# Patient Record
Sex: FEMALE | Race: WHITE | NOT HISPANIC OR LATINO | Employment: FULL TIME | URBAN - METROPOLITAN AREA
[De-identification: names, ages, dates, MRNs, and addresses within clinical notes are randomized per-mention and may not be internally consistent; named-entity substitution may affect disease eponyms.]

---

## 2019-07-16 ENCOUNTER — LAB VISIT (OUTPATIENT)
Dept: LAB | Facility: HOSPITAL | Age: 29
End: 2019-07-16
Attending: HOSPITALIST
Payer: COMMERCIAL

## 2019-07-16 ENCOUNTER — TELEPHONE (OUTPATIENT)
Dept: INTERNAL MEDICINE | Facility: CLINIC | Age: 29
End: 2019-07-16

## 2019-07-16 ENCOUNTER — PATIENT OUTREACH (OUTPATIENT)
Dept: ADMINISTRATIVE | Facility: HOSPITAL | Age: 29
End: 2019-07-16

## 2019-07-16 ENCOUNTER — OFFICE VISIT (OUTPATIENT)
Dept: INTERNAL MEDICINE | Facility: CLINIC | Age: 29
End: 2019-07-16
Payer: COMMERCIAL

## 2019-07-16 VITALS
HEART RATE: 84 BPM | BODY MASS INDEX: 23.71 KG/M2 | TEMPERATURE: 99 F | WEIGHT: 138.88 LBS | DIASTOLIC BLOOD PRESSURE: 70 MMHG | RESPIRATION RATE: 16 BRPM | HEIGHT: 64 IN | SYSTOLIC BLOOD PRESSURE: 104 MMHG

## 2019-07-16 DIAGNOSIS — Z11.3 SCREEN FOR STD (SEXUALLY TRANSMITTED DISEASE): ICD-10-CM

## 2019-07-16 DIAGNOSIS — Z00.00 ANNUAL PHYSICAL EXAM: ICD-10-CM

## 2019-07-16 DIAGNOSIS — Z00.00 ANNUAL PHYSICAL EXAM: Primary | ICD-10-CM

## 2019-07-16 LAB
25(OH)D3+25(OH)D2 SERPL-MCNC: 19 NG/ML (ref 30–96)
ALBUMIN SERPL BCP-MCNC: 4.5 G/DL (ref 3.5–5.2)
ALP SERPL-CCNC: 79 U/L (ref 55–135)
ALT SERPL W/O P-5'-P-CCNC: 12 U/L (ref 10–44)
ANION GAP SERPL CALC-SCNC: 8 MMOL/L (ref 8–16)
AST SERPL-CCNC: 16 U/L (ref 10–40)
BASOPHILS # BLD AUTO: 0.04 K/UL (ref 0–0.2)
BASOPHILS NFR BLD: 0.8 % (ref 0–1.9)
BILIRUB SERPL-MCNC: 0.6 MG/DL (ref 0.1–1)
BUN SERPL-MCNC: 8 MG/DL (ref 6–20)
CALCIUM SERPL-MCNC: 10 MG/DL (ref 8.7–10.5)
CHLORIDE SERPL-SCNC: 105 MMOL/L (ref 95–110)
CHOLEST SERPL-MCNC: 150 MG/DL (ref 120–199)
CHOLEST/HDLC SERPL: 1.9 {RATIO} (ref 2–5)
CO2 SERPL-SCNC: 26 MMOL/L (ref 23–29)
CREAT SERPL-MCNC: 0.7 MG/DL (ref 0.5–1.4)
DIFFERENTIAL METHOD: ABNORMAL
EOSINOPHIL # BLD AUTO: 0.1 K/UL (ref 0–0.5)
EOSINOPHIL NFR BLD: 2 % (ref 0–8)
ERYTHROCYTE [DISTWIDTH] IN BLOOD BY AUTOMATED COUNT: 12.3 % (ref 11.5–14.5)
EST. GFR  (AFRICAN AMERICAN): >60 ML/MIN/1.73 M^2
EST. GFR  (NON AFRICAN AMERICAN): >60 ML/MIN/1.73 M^2
ESTIMATED AVG GLUCOSE: 88 MG/DL (ref 68–131)
GLUCOSE SERPL-MCNC: 77 MG/DL (ref 70–110)
HAV IGM SERPL QL IA: NEGATIVE
HBA1C MFR BLD HPLC: 4.7 % (ref 4–5.6)
HBV CORE IGM SERPL QL IA: NEGATIVE
HBV SURFACE AG SERPL QL IA: NEGATIVE
HCT VFR BLD AUTO: 43.1 % (ref 37–48.5)
HCV AB SERPL QL IA: NEGATIVE
HDLC SERPL-MCNC: 77 MG/DL (ref 40–75)
HDLC SERPL: 51.3 % (ref 20–50)
HGB BLD-MCNC: 13.7 G/DL (ref 12–16)
HIV 1+2 AB+HIV1 P24 AG SERPL QL IA: NEGATIVE
IMM GRANULOCYTES # BLD AUTO: 0.02 K/UL (ref 0–0.04)
IMM GRANULOCYTES NFR BLD AUTO: 0.4 % (ref 0–0.5)
LDLC SERPL CALC-MCNC: 59.4 MG/DL (ref 63–159)
LYMPHOCYTES # BLD AUTO: 1 K/UL (ref 1–4.8)
LYMPHOCYTES NFR BLD: 20.5 % (ref 18–48)
MCH RBC QN AUTO: 31.4 PG (ref 27–31)
MCHC RBC AUTO-ENTMCNC: 31.8 G/DL (ref 32–36)
MCV RBC AUTO: 99 FL (ref 82–98)
MONOCYTES # BLD AUTO: 0.6 K/UL (ref 0.3–1)
MONOCYTES NFR BLD: 11 % (ref 4–15)
NEUTROPHILS # BLD AUTO: 3.3 K/UL (ref 1.8–7.7)
NEUTROPHILS NFR BLD: 65.3 % (ref 38–73)
NONHDLC SERPL-MCNC: 73 MG/DL
NRBC BLD-RTO: 0 /100 WBC
PLATELET # BLD AUTO: 303 K/UL (ref 150–350)
PMV BLD AUTO: 11 FL (ref 9.2–12.9)
POTASSIUM SERPL-SCNC: 3.8 MMOL/L (ref 3.5–5.1)
PROT SERPL-MCNC: 7.6 G/DL (ref 6–8.4)
RBC # BLD AUTO: 4.37 M/UL (ref 4–5.4)
SODIUM SERPL-SCNC: 139 MMOL/L (ref 136–145)
TRIGL SERPL-MCNC: 68 MG/DL (ref 30–150)
TSH SERPL DL<=0.005 MIU/L-ACNC: 1.27 UIU/ML (ref 0.4–4)
WBC # BLD AUTO: 5.08 K/UL (ref 3.9–12.7)

## 2019-07-16 PROCEDURE — 83036 HEMOGLOBIN GLYCOSYLATED A1C: CPT

## 2019-07-16 PROCEDURE — 36415 COLL VENOUS BLD VENIPUNCTURE: CPT | Mod: PO

## 2019-07-16 PROCEDURE — 99999 PR PBB SHADOW E&M-NEW PATIENT-LVL III: CPT | Mod: PBBFAC,,, | Performed by: HOSPITALIST

## 2019-07-16 PROCEDURE — 99385 PR PREVENTIVE VISIT,NEW,18-39: ICD-10-PCS | Mod: S$GLB,,, | Performed by: HOSPITALIST

## 2019-07-16 PROCEDURE — 86592 SYPHILIS TEST NON-TREP QUAL: CPT

## 2019-07-16 PROCEDURE — 80061 LIPID PANEL: CPT

## 2019-07-16 PROCEDURE — 82306 VITAMIN D 25 HYDROXY: CPT

## 2019-07-16 PROCEDURE — 99385 PREV VISIT NEW AGE 18-39: CPT | Mod: S$GLB,,, | Performed by: HOSPITALIST

## 2019-07-16 PROCEDURE — 86703 HIV-1/HIV-2 1 RESULT ANTBDY: CPT

## 2019-07-16 PROCEDURE — 80053 COMPREHEN METABOLIC PANEL: CPT

## 2019-07-16 PROCEDURE — 99999 PR PBB SHADOW E&M-NEW PATIENT-LVL III: ICD-10-PCS | Mod: PBBFAC,,, | Performed by: HOSPITALIST

## 2019-07-16 PROCEDURE — 84443 ASSAY THYROID STIM HORMONE: CPT

## 2019-07-16 PROCEDURE — 80074 ACUTE HEPATITIS PANEL: CPT

## 2019-07-16 PROCEDURE — 85025 COMPLETE CBC W/AUTO DIFF WBC: CPT

## 2019-07-16 RX ORDER — MINERAL OIL
180 ENEMA (ML) RECTAL DAILY
COMMUNITY

## 2019-07-16 NOTE — TELEPHONE ENCOUNTER
Dr. Cunningham is requesting pap records from:  Dr. Jeannie Marquez with [facility] from 2019.

## 2019-07-16 NOTE — PROGRESS NOTES
Subjective:     @Patient ID: Ada Ramirez is a 28 y.o. female.    Chief Complaint: Annual Exam and Establish Care    HPI    28 y.o. female here for annual exam. Pt is new to me. She works as a  at [employer]. She will be starting graduate school in the fall at [employer]. Plans to study English Literature. She reports she is otherwise well. Possibly anxious about starting grad school but will notify MD if becomes an issue once she starts.    Lipid disorders/ASCVD risk (ages >/= 45 or >/= 20 if increased risk ): ordered  DM (>45y yearly or if obese, HTN): A1c ordered  STD screening: ordered   Eye exam:  Feb 2019   Cervical Cancer (Pap Smear ages 21-65 every 3 years or Pap + HPV q5 years after 30 years of age):  Dr Jeannie Travis [place]. Done May 2019. Will get records        Vaccines:   Influenza (yearly)    Tetanus (every 10 yrs - 1st tdap) done Tdap 7/2019  PPSV23(>66yo or <65 w/ lung dz, smoking, DM)  n/a    Exercise: walking.   Diet: regular       Review of Systems   Constitutional: Negative for chills and fever.   HENT: Negative for congestion and sore throat.    Eyes: Negative for pain and visual disturbance.   Respiratory: Negative for cough and shortness of breath.    Cardiovascular: Negative for chest pain and leg swelling.   Gastrointestinal: Negative for abdominal pain, nausea and vomiting.   Endocrine: Negative for polydipsia and polyuria.   Genitourinary: Negative for difficulty urinating and dysuria.   Musculoskeletal: Negative for arthralgias and back pain.   Skin: Negative for rash and wound.   Neurological: Negative for dizziness, weakness and headaches.   Psychiatric/Behavioral: Negative for agitation and confusion.     Past medical history, surgical history, and family medical history reviewed and updated as appropriate.    Medications and allergies reviewed.     Objective:     Vitals:    07/16/19 0822   BP: 104/70   BP Location: Right arm   Patient Position: Sitting   BP Method:  "Medium (Manual)   Pulse: 84   Resp: 16   Temp: 98.8 °F (37.1 °C)   TempSrc: Oral   Weight: 63 kg (138 lb 14.2 oz)   Height: 5' 4" (1.626 m)     Body mass index is 23.84 kg/m².  Physical Exam   Constitutional: She is oriented to person, place, and time. She appears well-developed and well-nourished. No distress.   HENT:   Head: Normocephalic and atraumatic.   Mouth/Throat: Oropharynx is clear and moist. No oropharyngeal exudate.   Eyes: Conjunctivae are normal. Right eye exhibits no discharge. Left eye exhibits no discharge.   Neck: Normal range of motion. Neck supple.   Cardiovascular: Normal rate, regular rhythm and intact distal pulses. Exam reveals no friction rub.   No murmur heard.  Pulmonary/Chest: Effort normal and breath sounds normal.   Abdominal: Soft. Bowel sounds are normal. She exhibits no distension. There is no tenderness. There is no guarding.   Musculoskeletal: Normal range of motion. She exhibits no edema.   Lymphadenopathy:     She has no cervical adenopathy.   Neurological: She is alert and oriented to person, place, and time.   Skin: Skin is warm and dry.   Psychiatric: She has a normal mood and affect. Her behavior is normal.   Vitals reviewed.      No results found for: WBC, HGB, HCT, PLT, CHOL, TRIG, HDL, LDLDIRECT, ALT, AST, NA, K, CL, CREATININE, BUN, CO2, TSH, PSA, INR, GLUF, HGBA1C, MICROALBUR    Assessment:     1. Annual physical exam    2. Screen for STD (sexually transmitted disease)      Plan:   Ada was seen today for annual exam and establish care.    Diagnoses and all orders for this visit:    Annual physical exam  -     Comprehensive metabolic panel; Future  -     CBC auto differential; Future  -     TSH; Future  -     Vitamin D; Future  -     Lipid panel; Future  -     HIV 1/2 Ag/Ab (4th Gen); Future  -     Urinalysis; Future  -     HEMOGLOBIN A1C; Future  -     RPR; Future  -     C. trachomatis/N. gonorrhoeae by AMP DNA [facility]; Urine; Future  -     Hepatitis panel, acute; " Future    Screen for STD (sexually transmitted disease)  -     HIV 1/2 Ag/Ab (4th Gen); Future  -     RPR; Future  -     C. trachomatis/N. gonorrhoeae by AMP DNA [facility]; Urine; Future  -     Hepatitis panel, acute; Future          Follow up in about 1 year (around 7/16/2020), or if symptoms worsen or fail to improve.    Molly Cunningham MD  Internal Medicine    7/16/2019

## 2019-07-16 NOTE — LETTER
July 16, 2019    Dr. Marquez               [facility]  [address] [place] [address]  [phone]   July 16, 2019     Patient: Ada Ramirez    YOB: 1990   Date of Visit: 7/16/2019       To Whom It May Concern:    The patient listed above has been seen recently by a primary care provider, Dr. Cunningham, at [facility]. Please release the following information specified below for the patient.    Pap Smear    Please fax the requested record to our secure fax number [phone]  Attention: MARCOS Santos    Thank you for your help! If I can be of any assistance please contact me at the number listed below.      Regards,    Danielle Khalil LPN  Clinical Care Coordinator  For Molly Cunningham MD  [facility]  [phone] phone  [phone] fax  [email or web address]

## 2019-07-17 LAB — RPR SER QL: NORMAL

## 2019-07-18 ENCOUNTER — PATIENT MESSAGE (OUTPATIENT)
Dept: INTERNAL MEDICINE | Facility: CLINIC | Age: 29
End: 2019-07-18

## 2019-07-19 ENCOUNTER — PATIENT MESSAGE (OUTPATIENT)
Dept: INTERNAL MEDICINE | Facility: CLINIC | Age: 29
End: 2019-07-19

## 2019-08-12 ENCOUNTER — PATIENT OUTREACH (OUTPATIENT)
Dept: ADMINISTRATIVE | Facility: HOSPITAL | Age: 29
End: 2019-08-12

## 2019-11-27 ENCOUNTER — OFFICE VISIT (OUTPATIENT)
Dept: INTERNAL MEDICINE | Facility: CLINIC | Age: 29
End: 2019-11-27
Payer: COMMERCIAL

## 2019-11-27 VITALS
WEIGHT: 139.13 LBS | RESPIRATION RATE: 16 BRPM | HEART RATE: 90 BPM | TEMPERATURE: 99 F | DIASTOLIC BLOOD PRESSURE: 54 MMHG | HEIGHT: 64 IN | BODY MASS INDEX: 23.75 KG/M2 | SYSTOLIC BLOOD PRESSURE: 96 MMHG

## 2019-11-27 DIAGNOSIS — H60.91 OTITIS EXTERNA OF RIGHT EAR, UNSPECIFIED CHRONICITY, UNSPECIFIED TYPE: ICD-10-CM

## 2019-11-27 DIAGNOSIS — F43.23 SITUATIONAL MIXED ANXIETY AND DEPRESSIVE DISORDER: Primary | ICD-10-CM

## 2019-11-27 PROBLEM — F41.1 GAD (GENERALIZED ANXIETY DISORDER): Status: ACTIVE | Noted: 2019-11-27

## 2019-11-27 PROCEDURE — 99213 OFFICE O/P EST LOW 20 MIN: CPT | Mod: 25,S$GLB,, | Performed by: HOSPITALIST

## 2019-11-27 PROCEDURE — 3008F BODY MASS INDEX DOCD: CPT | Mod: CPTII,S$GLB,, | Performed by: HOSPITALIST

## 2019-11-27 PROCEDURE — 90686 IIV4 VACC NO PRSV 0.5 ML IM: CPT | Mod: S$GLB,,, | Performed by: HOSPITALIST

## 2019-11-27 PROCEDURE — 90471 FLU VACCINE (QUAD) GREATER THAN OR EQUAL TO 3YO PRESERVATIVE FREE IM: ICD-10-PCS | Mod: S$GLB,,, | Performed by: HOSPITALIST

## 2019-11-27 PROCEDURE — 99999 PR PBB SHADOW E&M-EST. PATIENT-LVL IV: ICD-10-PCS | Mod: PBBFAC,,, | Performed by: HOSPITALIST

## 2019-11-27 PROCEDURE — 3008F PR BODY MASS INDEX (BMI) DOCUMENTED: ICD-10-PCS | Mod: CPTII,S$GLB,, | Performed by: HOSPITALIST

## 2019-11-27 PROCEDURE — 99213 PR OFFICE/OUTPT VISIT, EST, LEVL III, 20-29 MIN: ICD-10-PCS | Mod: 25,S$GLB,, | Performed by: HOSPITALIST

## 2019-11-27 PROCEDURE — 90471 IMMUNIZATION ADMIN: CPT | Mod: S$GLB,,, | Performed by: HOSPITALIST

## 2019-11-27 PROCEDURE — 99999 PR PBB SHADOW E&M-EST. PATIENT-LVL IV: CPT | Mod: PBBFAC,,, | Performed by: HOSPITALIST

## 2019-11-27 PROCEDURE — 90686 FLU VACCINE (QUAD) GREATER THAN OR EQUAL TO 3YO PRESERVATIVE FREE IM: ICD-10-PCS | Mod: S$GLB,,, | Performed by: HOSPITALIST

## 2019-11-27 RX ORDER — NEOMYCIN SULFATE, POLYMYXIN B SULFATE AND HYDROCORTISONE 10; 3.5; 1 MG/ML; MG/ML; [USP'U]/ML
4 SUSPENSION/ DROPS AURICULAR (OTIC) 3 TIMES DAILY
Qty: 6 ML | Refills: 0 | Status: SHIPPED | OUTPATIENT
Start: 2019-11-27 | End: 2020-03-11

## 2019-11-27 RX ORDER — FLUOXETINE 10 MG/1
10 CAPSULE ORAL DAILY
Qty: 30 CAPSULE | Refills: 2 | Status: SHIPPED | OUTPATIENT
Start: 2019-11-27 | End: 2020-06-18

## 2019-11-27 RX ORDER — VALACYCLOVIR HYDROCHLORIDE 500 MG/1
TABLET, FILM COATED ORAL
Refills: 3 | COMMUNITY
Start: 2019-11-03 | End: 2022-04-25 | Stop reason: SDUPTHER

## 2019-11-27 NOTE — PATIENT INSTRUCTIONS

## 2019-11-27 NOTE — PROGRESS NOTES
"Subjective:     @Patient ID: Ada Ramirez is a 29 y.o. female.    Chief Complaint: Anxiety    HPI     30 yo F presents for evaluation of anxiety. recently started graduate school at [employer] and working full time as a . She is also in a new relationship and reports her roommate and best friend is moving to a new city. States it has been a lot of changes recently and her job has been stressful.   Reports feeling very anxious worsening this week. And feeling down as a result. States no active SI but has had thoughts of just wanting to sleep and   Has had anxiety in college. Was on prozac then. Later lexapro. Has not been on anything for 4 years. Reports not sleeping well, busier now with school and work and thinks that is contributing to.   Also reports R ear redness, pain     Review of Systems   Constitutional: Positive for activity change. Negative for unexpected weight change.   HENT: Negative for hearing loss, rhinorrhea and trouble swallowing.    Eyes: Negative for discharge and visual disturbance.   Respiratory: Negative for chest tightness and wheezing.    Cardiovascular: Negative for chest pain and palpitations.   Gastrointestinal: Negative for blood in stool, constipation, diarrhea and vomiting.   Endocrine: Negative for polydipsia and polyuria.   Genitourinary: Negative for difficulty urinating, dysuria, hematuria and menstrual problem.   Musculoskeletal: Negative for arthralgias, joint swelling and neck pain.   Neurological: Negative for weakness and headaches.   Psychiatric/Behavioral: Positive for dysphoric mood. Negative for confusion. The patient is nervous/anxious.      Past medical history, surgical history, and family medical history reviewed and updated as appropriate.    Medications and allergies reviewed.     Objective:     Vitals:    11/27/19 1510   BP: (!) 96/54   Pulse: 90   Resp: 16   Temp: 99 °F (37.2 °C)   TempSrc: Oral   Weight: 63.1 kg (139 lb 1.8 oz)   Height: 5' 4" (1.626 m)     Body " mass index is 23.88 kg/m².  Physical Exam   Constitutional: She is oriented to person, place, and time. She appears well-developed and well-nourished. No distress.   HENT:   Head: Normocephalic and atraumatic.   Left Ear: External ear normal.   Mouth/Throat: Oropharynx is clear and moist. No oropharyngeal exudate.   Normal TM b/l  R external ear appears red, inflammed   Eyes: Conjunctivae are normal. Right eye exhibits no discharge. Left eye exhibits no discharge.   Neck: Normal range of motion. Neck supple.   Cardiovascular: Normal rate, regular rhythm and intact distal pulses. Exam reveals no friction rub.   No murmur heard.  Pulmonary/Chest: Effort normal and breath sounds normal.   Musculoskeletal: Normal range of motion. She exhibits no edema.   Neurological: She is alert and oriented to person, place, and time.   Skin: Skin is warm and dry.   Psychiatric: She has a normal mood and affect. Her behavior is normal.   Vitals reviewed.      Lab Results   Component Value Date    WBC 5.08 07/16/2019    HGB 13.7 07/16/2019    HCT 43.1 07/16/2019     07/16/2019    CHOL 150 07/16/2019    TRIG 68 07/16/2019    HDL 77 (H) 07/16/2019    ALT 12 07/16/2019    AST 16 07/16/2019     07/16/2019    K 3.8 07/16/2019     07/16/2019    CREATININE 0.7 07/16/2019    BUN 8 07/16/2019    CO2 26 07/16/2019    TSH 1.269 07/16/2019    HGBA1C 4.7 07/16/2019       Assessment:     1. Situational mixed anxiety and depressive disorder    2. Otitis externa of right ear, unspecified chronicity, unspecified type      Plan:   Ada was seen today for anxiety.    Diagnoses and all orders for this visit:    Situational mixed anxiety and depressive disorder  - pt has hx of RYAN; Will start fluoxetine. Can increased to 20 mg in 1 week if tolerated. Pt to notify MD if would like to increase. Also counseled on possible side effects to monitor for (n/v, negative mood changes etc). Also encourage exercise. Will f/u in 2 months.     Otitis  externa of right ear, unspecified chronicity, unspecified type  - will give course of ear drops     Other orders  -     FLUoxetine 10 MG capsule; Take 1 capsule (10 mg total) by mouth once daily.  -     neomycin-polymyxin-hydrocortisone (CORTISPORIN) 3.5-10,000-1 mg/mL-unit/mL-% otic suspension; Place 4 drops into the right ear 3 (three) times daily. Do not use more than 10 days  -     Influenza - Quadrivalent (PF)        RTC 2 months   Molly Cunningham MD  Internal Medicine    11/27/2019

## 2019-12-02 ENCOUNTER — PATIENT MESSAGE (OUTPATIENT)
Dept: INTERNAL MEDICINE | Facility: CLINIC | Age: 29
End: 2019-12-02

## 2020-01-29 ENCOUNTER — OFFICE VISIT (OUTPATIENT)
Dept: INTERNAL MEDICINE | Facility: CLINIC | Age: 30
End: 2020-01-29
Payer: COMMERCIAL

## 2020-01-29 VITALS
TEMPERATURE: 99 F | HEIGHT: 64 IN | HEART RATE: 86 BPM | WEIGHT: 136.25 LBS | BODY MASS INDEX: 23.26 KG/M2 | SYSTOLIC BLOOD PRESSURE: 116 MMHG | DIASTOLIC BLOOD PRESSURE: 62 MMHG | RESPIRATION RATE: 16 BRPM

## 2020-01-29 DIAGNOSIS — F41.1 GAD (GENERALIZED ANXIETY DISORDER): Primary | ICD-10-CM

## 2020-01-29 PROCEDURE — 99213 PR OFFICE/OUTPT VISIT, EST, LEVL III, 20-29 MIN: ICD-10-PCS | Mod: S$GLB,,, | Performed by: HOSPITALIST

## 2020-01-29 PROCEDURE — 99999 PR PBB SHADOW E&M-EST. PATIENT-LVL III: CPT | Mod: PBBFAC,,, | Performed by: HOSPITALIST

## 2020-01-29 PROCEDURE — 99999 PR PBB SHADOW E&M-EST. PATIENT-LVL III: ICD-10-PCS | Mod: PBBFAC,,, | Performed by: HOSPITALIST

## 2020-01-29 PROCEDURE — 99213 OFFICE O/P EST LOW 20 MIN: CPT | Mod: S$GLB,,, | Performed by: HOSPITALIST

## 2020-01-29 PROCEDURE — 3008F BODY MASS INDEX DOCD: CPT | Mod: CPTII,S$GLB,, | Performed by: HOSPITALIST

## 2020-01-29 PROCEDURE — 3008F PR BODY MASS INDEX (BMI) DOCUMENTED: ICD-10-PCS | Mod: CPTII,S$GLB,, | Performed by: HOSPITALIST

## 2020-01-29 RX ORDER — BUSPIRONE HYDROCHLORIDE 5 MG/1
5 TABLET ORAL 2 TIMES DAILY
Qty: 60 TABLET | Refills: 5 | Status: SHIPPED | OUTPATIENT
Start: 2020-01-29 | End: 2020-08-03 | Stop reason: SDUPTHER

## 2020-02-03 NOTE — PROGRESS NOTES
"Subjective:     @Patient ID: Ada Ramirez is a 29 y.o. female.    Chief Complaint: Follow-up    HPI    28 yo F presents for f/u of anxiety. Pt reports initially fluoxetine was working but fluoxetine has not been working well for her as makes her feel strange, not herself. Denies any SI. She reports she decreased the dose of her medication to 10 mg since last office visit and would like to change meds. She does report her sister has been on buspar and that has worked well for her. Reports she is otherwise doing ok    Review of Systems   Constitutional: Negative for chills and fever.   HENT: Negative for congestion and sore throat.    Eyes: Negative for pain and visual disturbance.   Respiratory: Negative for cough and shortness of breath.    Cardiovascular: Negative for chest pain.   Gastrointestinal: Negative for abdominal pain, nausea and vomiting.   Genitourinary: Negative for difficulty urinating and dysuria.   Musculoskeletal: Negative for arthralgias and back pain.   Neurological: Negative for dizziness, weakness and headaches.   Psychiatric/Behavioral: Negative for agitation and self-injury. The patient is nervous/anxious.      Past medical history, surgical history, and family medical history reviewed and updated as appropriate.    Medications and allergies reviewed.     Objective:     Vitals:    01/29/20 1547   BP: 116/62   BP Location: Right arm   Patient Position: Sitting   BP Method: Medium (Manual)   Pulse: 86   Resp: 16   Temp: 99.1 °F (37.3 °C)   TempSrc: Oral   Weight: 61.8 kg (136 lb 3.9 oz)   Height: 5' 4" (1.626 m)     Body mass index is 23.39 kg/m².  Physical Exam   Constitutional: She is oriented to person, place, and time. She appears well-developed and well-nourished. No distress.   HENT:   Head: Normocephalic and atraumatic.   Eyes: Conjunctivae are normal. Right eye exhibits no discharge. Left eye exhibits no discharge.   Neck: Normal range of motion. Neck supple.   Cardiovascular: Normal " rate, regular rhythm and intact distal pulses. Exam reveals no friction rub.   No murmur heard.  Pulmonary/Chest: Effort normal and breath sounds normal.   Musculoskeletal: Normal range of motion. She exhibits no edema.   Neurological: She is alert and oriented to person, place, and time.   Skin: Skin is warm and dry.   Psychiatric: She has a normal mood and affect. Her behavior is normal.   Vitals reviewed.      Lab Results   Component Value Date    WBC 5.08 07/16/2019    HGB 13.7 07/16/2019    HCT 43.1 07/16/2019     07/16/2019    CHOL 150 07/16/2019    TRIG 68 07/16/2019    HDL 77 (H) 07/16/2019    ALT 12 07/16/2019    AST 16 07/16/2019     07/16/2019    K 3.8 07/16/2019     07/16/2019    CREATININE 0.7 07/16/2019    BUN 8 07/16/2019    CO2 26 07/16/2019    TSH 1.269 07/16/2019    HGBA1C 4.7 07/16/2019       Assessment:     1. RYAN (generalized anxiety disorder)      Plan:   Ada was seen today for follow-up.    Diagnoses and all orders for this visit:    RYAN (generalized anxiety disorder)       - Will wean off fluoxetine weekly (take every other day, then 3x/week, 2x/week, then stop). Pt aware       - Will start buspar. Counseled to monitor for possible side effects ie agitation, nausea etc. Notify MD if no improvement     Other orders  -     busPIRone (BUSPAR) 5 MG Tab; Take 1 tablet (5 mg total) by mouth 2 (two) times daily.          No follow-ups on file.    Molly Cunningham MD  Internal Medicine    2/3/2020

## 2020-02-07 ENCOUNTER — PATIENT MESSAGE (OUTPATIENT)
Dept: INTERNAL MEDICINE | Facility: CLINIC | Age: 30
End: 2020-02-07

## 2020-03-04 ENCOUNTER — PATIENT MESSAGE (OUTPATIENT)
Dept: INTERNAL MEDICINE | Facility: CLINIC | Age: 30
End: 2020-03-04

## 2020-03-04 NOTE — TELEPHONE ENCOUNTER
I spoke to the patient and she will wait to see Dr. Cunningham. The patient instructed to visit urgent care if symptoms worsens.

## 2020-03-11 ENCOUNTER — OFFICE VISIT (OUTPATIENT)
Dept: INTERNAL MEDICINE | Facility: CLINIC | Age: 30
End: 2020-03-11
Payer: COMMERCIAL

## 2020-03-11 ENCOUNTER — HOSPITAL ENCOUNTER (OUTPATIENT)
Dept: RADIOLOGY | Facility: HOSPITAL | Age: 30
Discharge: HOME | End: 2020-03-11
Attending: HOSPITALIST
Payer: COMMERCIAL

## 2020-03-11 VITALS
WEIGHT: 140.44 LBS | HEART RATE: 94 BPM | TEMPERATURE: 98 F | BODY MASS INDEX: 23.98 KG/M2 | RESPIRATION RATE: 19 BRPM | DIASTOLIC BLOOD PRESSURE: 72 MMHG | HEIGHT: 64 IN | SYSTOLIC BLOOD PRESSURE: 92 MMHG

## 2020-03-11 DIAGNOSIS — M53.3 COCCYX PAIN: ICD-10-CM

## 2020-03-11 DIAGNOSIS — K64.9 HEMORRHOIDS, UNSPECIFIED HEMORRHOID TYPE: ICD-10-CM

## 2020-03-11 DIAGNOSIS — G57.11 NEUROPATHY OF RIGHT LATERAL FEMORAL CUTANEOUS NERVE: Primary | ICD-10-CM

## 2020-03-11 PROCEDURE — 99999 PR PBB SHADOW E&M-EST. PATIENT-LVL IV: CPT | Mod: PBBFAC,,, | Performed by: HOSPITALIST

## 2020-03-11 PROCEDURE — 3008F PR BODY MASS INDEX (BMI) DOCUMENTED: ICD-10-PCS | Mod: CPTII,S$GLB,, | Performed by: HOSPITALIST

## 2020-03-11 PROCEDURE — 72220 X-RAY EXAM SACRUM TAILBONE: CPT | Mod: 26,,, | Performed by: RADIOLOGY

## 2020-03-11 PROCEDURE — 72220 XR SACRUM AND COCCYX: ICD-10-PCS | Mod: 26,,, | Performed by: RADIOLOGY

## 2020-03-11 PROCEDURE — 99214 OFFICE O/P EST MOD 30 MIN: CPT | Mod: S$GLB,,, | Performed by: HOSPITALIST

## 2020-03-11 PROCEDURE — 99999 PR PBB SHADOW E&M-EST. PATIENT-LVL IV: ICD-10-PCS | Mod: PBBFAC,,, | Performed by: HOSPITALIST

## 2020-03-11 PROCEDURE — 72220 X-RAY EXAM SACRUM TAILBONE: CPT | Mod: TC,PO

## 2020-03-11 PROCEDURE — 3008F BODY MASS INDEX DOCD: CPT | Mod: CPTII,S$GLB,, | Performed by: HOSPITALIST

## 2020-03-11 PROCEDURE — 99214 PR OFFICE/OUTPT VISIT, EST, LEVL IV, 30-39 MIN: ICD-10-PCS | Mod: S$GLB,,, | Performed by: HOSPITALIST

## 2020-03-11 RX ORDER — MELOXICAM 7.5 MG/1
7.5 TABLET ORAL DAILY
Qty: 30 TABLET | Refills: 1 | Status: SHIPPED | OUTPATIENT
Start: 2020-03-11 | End: 2020-05-13 | Stop reason: SDUPTHER

## 2020-03-11 RX ORDER — HYDROCORTISONE 25 MG/G
CREAM TOPICAL 2 TIMES DAILY
Qty: 28 G | Refills: 0 | Status: SHIPPED | OUTPATIENT
Start: 2020-03-11 | End: 2021-09-30

## 2020-03-11 NOTE — PROGRESS NOTES
"Subjective:     @Patient ID: Ada Ramirez is a 29 y.o. female.    Chief Complaint: Tailbone Pain (Chronic; when sitting, pain rate 3-4 on pain scale. Unable to lie on back due to pain)    HPI  30 yo F presents for urgent evaluation with multiple issues.   Reports tailbone pain since May. Had long journey to [facility], drove 2 days. Has had R thigh numbness  Thinks may have hemorrhoids. Sometimes painful b.m. Some blood when wiping. Last episode 1 month ago. Has not had hemorrhoids.  Reports daily.  Sometimes sitting on tailbone hurts. No pain when standing. No radiation.   Reports R lateral thigh decreased sensation.   No falls or injuries. Denies wearing tight belts or clothes   Has tried naproxen, meloxicam. Reports meloxicam did help. Naproxen/ibuprofen helps somewhat       Review of Systems   Constitutional: Negative for fever.   Respiratory: Negative for chest tightness and shortness of breath.    Cardiovascular: Negative for chest pain.   Gastrointestinal: Negative for abdominal pain.   Genitourinary: Negative for dysuria, hematuria and pelvic pain.   Musculoskeletal: Positive for back pain.   Neurological: Positive for numbness. Negative for weakness and headaches.   Psychiatric/Behavioral: Negative for agitation. The patient is nervous/anxious (improved).      Past medical history, surgical history, and family medical history reviewed and updated as appropriate.    Medications and allergies reviewed.     Objective:     Vitals:    03/11/20 1051   BP: 92/72   BP Location: Right arm   Patient Position: Sitting   BP Method: Medium (Manual)   Pulse: 94   Resp: 19   Temp: 98 °F (36.7 °C)   TempSrc: Oral   Weight: 63.7 kg (140 lb 6.9 oz)   Height: 5' 4" (1.626 m)     Body mass index is 24.11 kg/m².  Physical Exam   Constitutional: She is oriented to person, place, and time. She appears well-developed and well-nourished. No distress.   HENT:   Head: Normocephalic and atraumatic.   Right Ear: External ear normal. "   Left Ear: External ear normal.   Mouth/Throat: Oropharynx is clear and moist. No oropharyngeal exudate.   Eyes: Conjunctivae are normal. Right eye exhibits no discharge. Left eye exhibits no discharge.   Neck: Normal range of motion. Neck supple.   Cardiovascular: Normal rate, regular rhythm and intact distal pulses. Exam reveals no friction rub.   No murmur heard.  Pulmonary/Chest: Effort normal and breath sounds normal.   Abdominal: Soft. Bowel sounds are normal. She exhibits no distension. There is no tenderness. There is no guarding.   Genitourinary: Rectal exam shows guaiac negative stool.   Genitourinary Comments: Examined with nurse present  External hemorrhoid 5 o'clock, internal hemorrhoid 7 o'clock position  Normal anal sphincter tone    Musculoskeletal: Normal range of motion. She exhibits no edema.   Neurological: She is alert and oriented to person, place, and time. A sensory deficit (r lateral thigh) is present. No cranial nerve deficit.   Decreased sensation of  R lateral thigh   Skin: Skin is warm and dry.   Psychiatric: She has a normal mood and affect. Her behavior is normal.   Vitals reviewed.      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 Hemorrhoids, unspecified hemorrhoid type    3. Coccyx pain      Plan:   Ada was seen today for tailbone pain.    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 for 10 days  -     Encouraged oral hydration, increase fiber intake. OTC stool softener prn. Avoid prolonged sitting and straining     Coccyx pain  -     X-Ray Sacrum And Coccyx; Future  -     meloxicam (MOBIC) 7.5 MG tablet; Take 1 tablet (7.5 mg total) by mouth once daily.          No follow-ups on file.    Molly Cunningham MD  Internal Medicine    3/11/2020

## 2020-03-11 NOTE — PATIENT INSTRUCTIONS
Treating Hemorrhoids: Self-Care    Follow your healthcare providers advice about caring for your hemorrhoids at home. Some treatments help relieve symptoms right away. Others involve making changes in your diet and exercise habits. These can help ease constipation and prevent hemorrhoid symptoms from coming back.  Relieving symptoms  Your healthcare provider may prescribe anti-inflammatory medicine to help ease your symptoms. The following tips will also help relieve pain and swelling.  · Take sitz baths. Taking a sitz bath means sitting in a few inches of warm bath water. Soaking for 10 minutes twice a day can provide welcome relief from painful hemorrhoids. It can also help the area stay clean.  · Develop good bowel habits. Use the bathroom when you need to. Dont ignore the urge to move your bowels. This can lead to constipation, hard stools, and straining. Also, dont read while on the toilet. Sit only as long as needed. Wipe gently with soft, unscented toilet tissue or baby wipes.  · Use ice packs. Placing an ice pack on a thrombosed external hemorrhoid can help relieve pain right away. It will also help reduce the blood clot. Use the ice for 15 to 20 minutes at a time. Keep a cloth between the ice and your skin to prevent skin damage.  · Use other measures. Laxatives and enemas can help ease constipation. But use them only on your healthcare providers advice. For symptom relief, try using cotton pads soaked in witch hazel. These are available at most drugstores. Over-the-counter hemorrhoid ointments and petroleum jelly can also provide relief.  Add fiber to your diet  Adding fiber to your diet can help relieve constipation by making stools softer and easier to pass. To increase your fiber intake, your healthcare provider may recommend a bulking agent, such as psyllium. This is a high-fiber supplement available at most grocery stores and drugstores. Eating more fiber-rich foods will also help. There are two  types of fiber:  · Insoluble fiber is the main ingredient in bulking agents. Its also found in foods such as wheat bran, whole-grain breads, fresh fruits, and vegetables.  · Soluble fiber is found in foods such as oat bran. Although soluble fiber is good for you, it may not ease constipation as much as foods high in insoluble fiber.  Drink more water  Along with a high-fiber diet, drinking more water can help ease constipation. This is because insoluble fiber absorbs water, making stools soft and bulky. Be sure to drink plenty of water throughout the day. Drinking fruit juices, such as prune juice or apple juice, can also help prevent constipation.  Get more exercise  Regular exercise aids digestion and helps prevent constipation. Its also great for your health. So talk with your healthcare provider about starting an exercise program. Low-impact activities, such as swimming or walking, are good places to start. Take it easy at first. And remember to drink plenty of water when you exercise.  High-fiber foods  High-fiber foods offer many benefits. By making your stools softer, they help heal and prevent swollen hemorrhoids. They may also help reduce the risk of colon and rectal cancer. Best of all, theyre usually low in calories and taste great. Here are some examples of fiber-rich foods.  · Whole grains, such as wheat bran, corn bran, and brown rice.  · Vegetables, especially carrots, broccoli, cabbage, and peas.  · Fruits, such as apples, bananas, raisins, peaches, and pears.  · Nuts and legumes, especially peanuts, lentils, and kidney beans.  Easy ways to add fiber  The tips below offer some simple ways to add more high-fiber foods to your meals.  · Start your day with a high-fiber breakfast. Eat a wheat bran cereal along with a sliced banana. Or, try peanut butter on whole-wheat toast.  · Eat carrot sticks for snacks. Theyre easy to prepare, taste great, and are low in calories.  · Use whole-grain breads  instead of white bread for sandwiches.  · Eat fruits for treats. Try an apple and some raisins instead of a candy bar.   Date Last Reviewed: 7/1/2016 © 2000-2017 [employer]. [address] [place] [address], [place] [address]. All rights reserved. This information is not intended as a substitute for professional medical care. Always follow your healthcare professional's instructions.        Taking a Sitz Bath  A sitz bath is a type of therapy done by sitting in warm, shallow water. It can help soothe pain, itching, and other symptoms in the anal and genital areas. It can also help keep these areas clean if you cant take a bath or shower. Sitz is from the Afghan word sitzen, which means to sit.  Why a sitz bath is done  A sitz bath helps clean and treat certain problems in the anal area, genital area, and the perineum. The perineum is the area between the anus and the vulva in women. In men, it is between the anus and the scrotum. A sitz bath helps increase blood flow to these areas and relax the muscles.  A sitz bath may be done to:  · Help ease pain and itching from hemorrhoids  · Help ease pain from an anal fissure  · Bathe and soothe the perineum after childbirth  · Clean and soothe the anal area or perineum after surgery  · Ease prostate pain during prostatitis or after a procedure  · Help ease period cramps  · Clean the anal and genital areas if you cant take a bath or shower  How a sitz bath is done  A sitz bath can be done in 2 ways: in a bathtub or using a sitz bath bowl.  In a bathtub  To take a sitz bath in a tub:  · Make sure your bathtub is clean. Fill a clean bathtub with 3 to 4 inches of warm water. In some cases, your healthcare provider may tell you to use cold water instead.  · Add salt or medicine to the water if advised by your healthcare provider.  · Gently lower yourself down into the bathtub and sit on the bottom of the tub. Dont get into the bath unless the water temperature is  comfortable.  · Hold on to a railing. Or ask for help from a family member, friend, or caregiver if needed.  If you have a wound, the water may cause pain at first. But the pain should ease. Make sure the area that needs treating is under the water. You can bend your knees up to help expose the area that needs contact with the water.  Using a sitz bath bowl  A sitz bath bowl is a special plastic container thats placed on a toilet. You can buy this in many [employer] and medical supply stores. To take a sitz bath this way:  · Lift the toilet lid and seat. Place a cleaned plastic sitz bath bowl on the rim of your toilet. Make sure the bowl is firmly in place and wont move around.  · Fill the sitz bath bowl with warm water from a pitcher or other container. The water should cover your perineum. Make sure the water temperature is comfortable.  · Add salt or medicine to the water if advised by your healthcare provider. Or follow the package instructions about how to fill the bowl. Some kits come with a plastic bag and tubing. This lets you stream water into the bowl and at the area of your body that needs treatment. The bowl may have a slot or hole in the back. This lets water flow out so it doesnt overflow onto the floor. If there is no hole, be careful not to fill the bowl too full.  · Gently sit down on the sitz bath bowl. Hold on to a railing. Or ask for help from a family member, friend, or caregiver if needed.  If you have a wound, the water may cause pain at first, but the pain should ease. Make sure the area that needs treating is under the water.  For any type of sitz bath:  1. Sit in the water for 10 to 20 minutes.  2. Add more warm water as needed to keep the water comfortable.  3. Get up slowly from the tub or toilet. You may feel lightheaded or dizzy. Hold on to a railing. Or ask for help from a family member, friend, or caregiver if needed.  4. Gently pat your anal area, perineum, and genitals dry with a  clean towel. Dont rub the area.  5. Wash your hands. Put any ointment or cream on the area, if advised.  6. Wash the bathtub or sitz bath bowl with soap and water after each use.  7. Use a sitz bath 2 to 3 times a day, or as often as your healthcare provider advises.  When to call your healthcare provider  Call your healthcare provider right away if you have any of these:  · Fever of 100.4°F (38°C) or higher, or as directed  · Redness, swelling, or fluid leaking from a cut (incision) that gets worse  · Pain that gets worse  · Symptoms that dont get better, or get worse  · New symptoms   Date Last Reviewed: 5/1/2016 © 2000-2017 [employer]. [address] [place] [address]. All rights reserved. This information is not intended as a substitute for professional medical care. Always follow your healthcare professional's instructions.        Understanding Coccygodynia    Coccygodynia is pain at the lowest tip of the spine (the coccyx, or tailbone). This is sometimes called a bruised tailbone. Tailbone pain can be very uncomfortable. It can also interfere with daily activities, such as driving.     How to say it  [id number]   What causes coccygodynia?  Causes of tailbone pain include:  · Injury to the tailbone from a blow or fall  · A bone spur on the tailbone  · Poor posture while sitting  · Sitting for a long time in an uncomfortable position  · Vaginal childbirth  · Arthritis  In some cases, the cause of the pain cant be found.  Symptoms of coccygodynia  You may feel:  · A dull ache or sharp pain in the tailbone area, near the top of the buttocks  · Muscle spasms in the lower back or pelvic area  · A sense of pressure in the rectum  Pain may be triggered by things like walking or standing up from sitting. It may hurt more if you sit for long periods. Things that put pressure on the tailbone, such as riding a horse or having sex, may also trigger the pain.  Treatment for  coccygodynia  Tailbone pain often goes away by itself within a few months. Treatment focuses on reducing pain and protecting the tailbone. Treatments can include:  · Over-the-counter or prescription pain medicine to help relieve pain and swelling  · Warm or cold to help relieve symptoms for a time, such as a heating pad, hot water bottle, or ice pack  · Keeping pressure off the tailbone to help the area heal by shifting weight forward onto your hipbones and thighs when sitting or sitting on a special cushion  · Medicine injected into the area to help relieve severe symptoms  · Physical therapy to help strengthen the structures around the tailbone  If no other treatments work, you may need surgery to remove all or part of the coccyx.     When to call your healthcare provider  Call your healthcare provider right away if you have any of these:  · Pain that continues for more than 2 months or gets worse  · Pain that limits your usual activities  · Pain that doesnt get better by trying the self-care treatments described above  · Fever of 100.4°F (38°C) or higher, or as directed  · Redness or swelling  · A new sore in the cleft of the buttocks, especially one that contains or drains pus  · Other new symptoms   Date Last Reviewed: 3/10/2016  © 2928-3327 [employer] [address] [place] [address], [place] [address]. All rights reserved. This information is not intended as a substitute for professional medical care. Always follow your healthcare professional's instructions.

## 2020-03-12 ENCOUNTER — PATIENT MESSAGE (OUTPATIENT)
Dept: INTERNAL MEDICINE | Facility: CLINIC | Age: 30
End: 2020-03-12

## 2020-05-09 ENCOUNTER — PATIENT MESSAGE (OUTPATIENT)
Dept: INTERNAL MEDICINE | Facility: CLINIC | Age: 30
End: 2020-05-09

## 2020-05-09 DIAGNOSIS — M54.50 LOW BACK PAIN WITHOUT SCIATICA, UNSPECIFIED BACK PAIN LATERALITY, UNSPECIFIED CHRONICITY: Primary | ICD-10-CM

## 2020-05-09 DIAGNOSIS — K64.9 HEMORRHOIDS, UNSPECIFIED HEMORRHOID TYPE: Primary | ICD-10-CM

## 2020-05-09 DIAGNOSIS — M53.3 COCCYX PAIN: ICD-10-CM

## 2020-05-13 RX ORDER — MELOXICAM 7.5 MG/1
7.5 TABLET ORAL DAILY
Qty: 30 TABLET | Refills: 1 | Status: SHIPPED | OUTPATIENT
Start: 2020-05-13 | End: 2020-06-18

## 2020-05-15 ENCOUNTER — PATIENT MESSAGE (OUTPATIENT)
Dept: INTERNAL MEDICINE | Facility: CLINIC | Age: 30
End: 2020-05-15

## 2020-05-15 DIAGNOSIS — Z20.822 EXPOSURE TO COVID-19 VIRUS: Primary | ICD-10-CM

## 2020-05-21 ENCOUNTER — LAB VISIT (OUTPATIENT)
Dept: LAB | Facility: HOSPITAL | Age: 30
End: 2020-05-21
Attending: HOSPITALIST
Payer: COMMERCIAL

## 2020-05-21 DIAGNOSIS — Z20.822 EXPOSURE TO COVID-19 VIRUS: ICD-10-CM

## 2020-05-21 LAB — SARS-COV-2 IGG SERPLBLD QL IA.RAPID: NEGATIVE

## 2020-05-21 PROCEDURE — 86769 SARS-COV-2 COVID-19 ANTIBODY: CPT

## 2020-05-21 PROCEDURE — 36415 COLL VENOUS BLD VENIPUNCTURE: CPT | Mod: PO

## 2020-05-24 ENCOUNTER — PATIENT OUTREACH (OUTPATIENT)
Dept: ADMINISTRATIVE | Facility: OTHER | Age: 30
End: 2020-05-24

## 2020-05-25 NOTE — PROGRESS NOTES
CRS Office Visit History and Physical    Referring Md:   Molly Cunningham Md  [address] [facility]  [place] [address]    SUBJECTIVE:     Chief Complaint: Anal itching    History of Present Illness:  The patient is new patient to this practice.   Course is as follows:  Patient is a 29 y.o. female presents with anal itching.  Symptoms have been present for 6 months.  Has tried steroid cream intermittently prescribed by primary care physician.  She did get some relief with the steroid cream.  Denies any rectal bleeding.  No prior abdominal or anorectal surgery.  Great grandmother on mother and father side had colorectal cancer.  No family members with inflammatory bowel disease.  She denies taking any blood thinners.  She does not take any fiber supplementation or stool softeners.  She is a current smoker.    Wax nerve fecal incontinence score is 0.  Denies spending more than 5 min on the toilet or straining with bowel movements.  Stool is Deuel 4.  She has bowel movements a few times per week.  Denies any urinary incontinence.    No prior colonoscopy.    She does have several food allergies.  She also states that she had some vaginal irritation and recently switched to Dove soap and hypoallergenic laundry detergent.  She denies using any medicated wipes.  She does say that she has a 'thorough ' of that area.    Her symptoms also started around the team that she started developing some tailbone pain and right leg numbness which followed a long car trip.  She is seeing a specialist for this in a few weeks.    Review of patient's allergies indicates:   Allergen Reactions    Cephalosporins Rash    Penicillins Rash    Sulfa (sulfonamide antibiotics) Rash    Corn containing products Diarrhea and Nausea And Vomiting    Eggs [egg derived] Nausea Only    Latex, natural rubber Dermatitis and Rash       Past Medical History:   Diagnosis Date    Allergy      Past Surgical History:   Procedure Laterality Date     "ADENOIDECTOMY      TONSILLECTOMY       Family History   Problem Relation Age of Onset    Fibromyalgia Mother     Hypertension Father      Social History     Tobacco Use    Smoking status: Light Tobacco Smoker     Types: Cigarettes    Smokeless tobacco: Never Used   Substance Use Topics    Alcohol use: Yes     Frequency: 2-4 times a month     Drinks per session: 1 or 2     Binge frequency: Never     Comment: once week     Drug use: Never        Review of Systems:  Review of Systems   Gastrointestinal: Negative for abdominal pain, blood in stool, constipation and diarrhea.   All other systems reviewed and are negative.      OBJECTIVE:     Vital Signs (Most Recent)  /73   Pulse 94   Ht 5' 4" (1.626 m)   Wt 66.5 kg (146 lb 9.7 oz)   BMI 25.16 kg/m²     Physical Exam:  General: White female in no distress   Neuro: Alert and oriented x 4.  Moves all extremities.     HEENT: No icterus.  Trachea midline  Respiratory: Respirations are even and unlabored  Cardiac: Regular rate  Extremities: Warm dry and intact  Skin: No rashes    Anorectal:   External exam:  2 small skin abrasions over the right lateral external area, no severe skin excoriation.  No skin tags or external hemorrhoids.  No evidence of fistula or abscess.  Digital exam revealed normal tone.    Anoscopy:  Verbal consent was obtained.   Clear plastic anoscope inserted.    Grade I hemorrhoids seen.      ASSESSMENT/PLAN:     29-year-old female with pruritus ani    Discussed conservative management today in clinic including anal hygiene, use of barrier cream, discontinuation of steroid cream, stool bulking with fiber, and dietary eliminations.  She also describes what sounds like some sciatic nerve symptoms, which may be contributing to the sensation of itching in that area.  She is already seeking treatment for this.  Will see her back in clinic in 3-4 weeks.    Stormy Vazquez MD  Staff Surgeon, Colon and Rectal Surgery  [facility]

## 2020-05-26 ENCOUNTER — OFFICE VISIT (OUTPATIENT)
Dept: SURGERY | Facility: CLINIC | Age: 30
End: 2020-05-26
Attending: COLON & RECTAL SURGERY
Payer: COMMERCIAL

## 2020-05-26 VITALS
HEART RATE: 94 BPM | BODY MASS INDEX: 25.03 KG/M2 | WEIGHT: 146.63 LBS | DIASTOLIC BLOOD PRESSURE: 73 MMHG | SYSTOLIC BLOOD PRESSURE: 109 MMHG | HEIGHT: 64 IN

## 2020-05-26 DIAGNOSIS — K64.9 HEMORRHOIDS, UNSPECIFIED HEMORRHOID TYPE: ICD-10-CM

## 2020-05-26 DIAGNOSIS — L29.0 ANAL ITCHING: Primary | ICD-10-CM

## 2020-05-26 PROCEDURE — 46600 PR DIAG2STIC A2SCOPY: ICD-10-PCS | Mod: S$GLB,,, | Performed by: COLON & RECTAL SURGERY

## 2020-05-26 PROCEDURE — 3008F BODY MASS INDEX DOCD: CPT | Mod: CPTII,S$GLB,, | Performed by: COLON & RECTAL SURGERY

## 2020-05-26 PROCEDURE — 99203 PR OFFICE/OUTPT VISIT, NEW, LEVL III, 30-44 MIN: ICD-10-PCS | Mod: 25,S$GLB,, | Performed by: COLON & RECTAL SURGERY

## 2020-05-26 PROCEDURE — 99203 OFFICE O/P NEW LOW 30 MIN: CPT | Mod: 25,S$GLB,, | Performed by: COLON & RECTAL SURGERY

## 2020-05-26 PROCEDURE — 3008F PR BODY MASS INDEX (BMI) DOCUMENTED: ICD-10-PCS | Mod: CPTII,S$GLB,, | Performed by: COLON & RECTAL SURGERY

## 2020-05-26 PROCEDURE — 99999 PR PBB SHADOW E&M-EST. PATIENT-LVL IV: CPT | Mod: PBBFAC,,, | Performed by: COLON & RECTAL SURGERY

## 2020-05-26 PROCEDURE — 46600 DIAGNOSTIC ANOSCOPY SPX: CPT | Mod: S$GLB,,, | Performed by: COLON & RECTAL SURGERY

## 2020-05-26 PROCEDURE — 99999 PR PBB SHADOW E&M-EST. PATIENT-LVL IV: ICD-10-PCS | Mod: PBBFAC,,, | Performed by: COLON & RECTAL SURGERY

## 2020-05-26 NOTE — PATIENT INSTRUCTIONS
Pruritus Ani    1. During bath or shower, wash outside the anal area very gently. Do not use soap on anal area.  Soap may cause irritation.    2. Pat dry! Avoid rubbing area with wash cloth or towel. You may use a hair dryer (on low  setting) to dry the anal area.    3. Following each bowel movement, you can wipe the anal area with moist pads (Avoid use of wipes with alcohol or witch hazel, or dry toilet paper on irritated skin). You can also rinse the area with a squirt bottle or shower water.    4. During the day wear absorbent cotton underwear. Use a small piece torn from a cotton ball  or a 4 x 4 gauze to keep the area dry.    5. It is important to keep the stool soft and large so that it passes through the rectum without  causing trauma. This can be accomplished by the following:    a. Metamucil, Citrucel, or Konsyl.  b. Eat a high fiber diet (20-30grams/day) which includes:  8-10 glasses of water or juice a day, plenty of fruits and vegetables, bran cereal everyday.    6. Avoid foods that cause bowel irritation, that are mucous-producing or aggravate  drainage. These include: Dark renee, pepper, citrus fruits and juices, coffee (regular  and decaffeinated), beer and alcoholic beverages, nuts, popcorn, milk, and anyitems  you have found to produce gas or indigestion. You may have 7-Up, ginger ale, and  other light colored soft drinks.

## 2020-05-26 NOTE — LETTER
May 26, 2020      Molly Cunningham MD  [address]  [place] [address]           [facility]  [address]  [facility] [address]  Phone: [phone]          Patient: Ada Ramirez   MR Number: [id number]   YOB: 1990   Date of Visit: 5/26/2020       Dear Dr. Molly Cunningham:    Thank you for referring Ada Ramirez to me for evaluation. Attached you will find relevant portions of my assessment and plan of care.    If you have questions, please do not hesitate to call me. I look forward to following Ada Ramirez along with you.    Sincerely,    Stormy Vazquez MD    Enclosure  CC:  No Recipients    If you would like to receive this communication electronically, please contact [email or web address] or [phone] to request more information on [employer] access.    For providers and/or their staff who would like to refer a patient to [facility], please contact us through our one-stop-shop provider referral line, [facility], at [phone].    If you feel you have received this communication in error or would no longer like to receive these types of communications, please e-mail [email or web address]

## 2020-06-17 ENCOUNTER — PATIENT OUTREACH (OUTPATIENT)
Dept: ADMINISTRATIVE | Facility: OTHER | Age: 30
End: 2020-06-17

## 2020-06-17 NOTE — PROGRESS NOTES
Subjective:      Patient ID: Ada Ramirez is a 29 y.o. female.    Chief Complaint: No chief complaint on file.    Ms Ramirez is a 28 yo female sent in consultation by Dr. Cunningham for evaluation of tail bone pain and right thigh numbness.  May 2019 she went on road trip and has had tailbone pain since then.  The pain is only with pressure.  The pain is with sitting, lying down and getting up from sitting.  She does not have pain with standing.  She does lie on side and sometimes that hurts as well.  The pain is a sharp pain and an occasional ache.  The pain is 5/10, worst 7/10 sit too long, best 0/10 standing.  She has taken mobic with mild relief.  She has noticed some right leg pain on the outer leg and sometimes numbness.  She also is seeing colorectal and some concern for food allergies causing pain and itching    X-ray sacrum 3/11/2020  No fracture dislocation bone destruction or other abnormality seen.    Past Medical History:  No date: Allergy    Past Surgical History:  No date: ADENOIDECTOMY  No date: TONSILLECTOMY    Review of patient's family history indicates:  Problem: Fibromyalgia      Relation: Mother          Age of Onset: (Not Specified)  Problem: Hypertension      Relation: Father          Age of Onset: (Not Specified)      Social History    Socioeconomic History      Marital status: Single      Spouse name: Not on file      Number of children: Not on file      Years of education: Not on file      Highest education level: Not on file    Occupational History      Occupation:     Social Needs      Financial resource strain: Somewhat hard      Food insecurity        Worry: Sometimes true        Inability: Never true      Transportation needs        Medical: No        Non-medical: No    Tobacco Use      Smoking status: Light Tobacco Smoker        Types: Cigarettes      Smokeless tobacco: Never Used    Substance and Sexual Activity      Alcohol use: Yes        Frequency: 2-4 times a month         Drinks per session: 1 or 2        Binge frequency: Never        Comment: once week       Drug use: Never      Sexual activity: Not Currently        Partners: Female    Lifestyle      Physical activity        Days per week: 3 days        Minutes per session: 40 min      Stress: Only a little    Relationships      Social connections        Talks on phone: Once a week        Gets together: Once a week        Attends Sabianist service: Not on file        Active member of club or organization: No        Attends meetings of clubs or organizations: Patient refused        Relationship status: Patient refused    Other Topics      Concerns:        Not on file    Social History Narrative      Not on file      Current Outpatient Medications:  boric acid (BORIC ACID) vaginal suppository, insert 1 suppository vaginally 1-2 times weekly, Disp: , Rfl:   busPIRone (BUSPAR) 5 MG Tab, Take 1 tablet (5 mg total) by mouth 2 (two) times daily., Disp: 60 tablet, Rfl: 5  fexofenadine (ALLEGRA) 180 MG tablet, Take 180 mg by mouth once daily., Disp: , Rfl:   FLUoxetine 10 MG capsule, Take 1 capsule (10 mg total) by mouth once daily. (Patient not taking: Reported on 5/26/2020), Disp: 30 capsule, Rfl: 2  hydrocortisone 2.5 % cream, Apply topically 2 (two) times daily. for 10 days, Disp: 28 g, Rfl: 0  meloxicam (MOBIC) 7.5 MG tablet, Take 1 tablet (7.5 mg total) by mouth once daily. (Patient not taking: Reported on 5/26/2020), Disp: 30 tablet, Rfl: 1  valACYclovir (VALTREX) 500 MG tablet, TK 1 T PO D, Disp: , Rfl: 3    No current facility-administered medications for this visit.       Review of patient's allergies indicates:   -- Cephalosporins -- Rash   -- Penicillins -- Rash   -- Sulfa (sulfonamide antibiotics) -- Rash   -- Corn containing products -- Diarrhea and Nausea And Vomiting   -- Eggs (egg derived) -- Nausea Only   -- Latex, natural rubber -- Dermatitis and Rash        Review of Systems   Constitution: Negative for weight gain and  weight loss.   Cardiovascular: Negative for chest pain.   Respiratory: Negative for shortness of breath.    Musculoskeletal: Positive for back pain. Negative for joint pain and joint swelling.   Gastrointestinal: Negative for abdominal pain, bowel incontinence, nausea and vomiting.   Genitourinary: Negative for bladder incontinence.   Neurological: Positive for numbness (right anterolateral thigh at times). Negative for paresthesias.         Objective:        General: Ada is well-developed, well-nourished, appears stated age, in no acute distress, alert and oriented to time, place and person.     General    Vitals reviewed.  Constitutional: She is oriented to person, place, and time. She appears well-developed and well-nourished.   HENT:   Head: Normocephalic and atraumatic.   Pulmonary/Chest: Effort normal.   Neurological: She is alert and oriented to person, place, and time.   Psychiatric: She has a normal mood and affect. Her behavior is normal. Judgment and thought content normal.     General Musculoskeletal Exam   Gait: normal     Right Ankle/Foot Exam     Tests   Heel Walk: able to perform  Tiptoe Walk: able to perform    Left Ankle/Foot Exam     Tests   Heel Walk: able to perform  Tiptoe Walk: able to perform  Back (L-Spine & T-Spine) / Neck (C-Spine) Exam     Tenderness Posterior midline palpation reveals tenderness of the Sacrum.     Back (L-Spine & T-Spine) Range of Motion   Extension: 30   Flexion: 90   Lateral bend right: 20   Lateral bend left: 20   Rotation right: 40   Rotation left: 40     Spinal Sensation   Right Side Sensation  C-Spine Level: normal   L-Spine Level: normal  S-Spine Level: normal  Left Side Sensation  C-Spine Level: normal  L-Spine Level: normal  S-Spine Level: normal    Back (L-Spine & T-Spine) Tests   Right Side Tests  Straight leg raise:      Sitting SLR: > 70 degrees      Left Side Tests  Straight leg raise:     Sitting SLR: > 70 degrees          Other She has no scoliosis  .  Spinal Kyphosis:  Absent    Comments:  Neg RANJIT bilaterally      Muscle Strength   Right Upper Extremity   Biceps: 5/5/5   Deltoid:  5/5  Triceps:  5/5  Wrist extension: 5/5/5   Finger Flexors:  5/5  Left Upper Extremity  Biceps: 5/5/5   Deltoid:  5/5  Triceps:  5/5  Wrist extension: 5/5/5   Finger Flexors:  5/5  Right Lower Extremity   Hip Flexion: 5/5   Quadriceps:  5/5   Anterior tibial:  5/5/5  EHL:  5/5  Left Lower Extremity   Hip Flexion: 5/5   Quadriceps:  5/5   Anterior tibial:  5/5/5   EHL:  5/5    Reflexes     Left Side  Biceps:  2+  Triceps:  2+  Brachioradialis:  2+  Quadriceps:  2+  Achilles:  2+  Left Nuñez's Sign:  Absent  Babinski Sign:  absent    Right Side   Biceps:  2+  Triceps:  2+  Brachioradialis:  2+  Quadriceps:  2+  Achilles:  2+  Right Nuñez's Sign:  absent  Babinski Sign:  absent    Vascular Exam     Right Pulses        Carotid:                  2+    Left Pulses        Carotid:                  2+              Assessment:       1. Coccydynia    2. Low back pain without sciatica, unspecified back pain laterality, unspecified chronicity           Plan:       Orders Placed This Encounter    Procedure Order to Pain Management     1. We discussed buttock pain and the nature of buttock pain.  We discussed that it could be inflammation of the tailbone.  She has tenderness to palpation  2. We discussed donut pillow, but she just feels more pressure on butt cheeks   3. We did discuss that sometimes increase rectal spasms can cause buttock pain, and sometimes elavil can help.  She is currently doing an elimination diet to see if food allergy  4. Nucleus of impar injection with pain management  5. RTC 3 motnhs    More than 50% of the total time  of 45 minutes was spent face to face in counseling on diagnosis and treatment options. I also counseled patient  on common and most usual side effect of prescribed medications.  I reviewed Primary care , and other specialty's notes to better  coordinate patient's care. All questions were answered, and patient voiced understanding.     A consultation note will be sent to Dr. Cunningham through epic.  Thank you for the consult      Follow-up: Follow up in about 3 months (around 9/18/2020). If there are any questions prior to this, the patient was instructed to contact the office.

## 2020-06-17 NOTE — PROGRESS NOTES
Chart reviewed.   Immunizations: Triggered Imm Registry     Orders placed: n/a  Upcoming appts to satisfy BRYN topics: n/a

## 2020-06-18 ENCOUNTER — OFFICE VISIT (OUTPATIENT)
Dept: SPINE | Facility: CLINIC | Age: 30
End: 2020-06-18
Attending: PHYSICAL MEDICINE & REHABILITATION
Payer: COMMERCIAL

## 2020-06-18 VITALS
HEART RATE: 84 BPM | HEIGHT: 64 IN | WEIGHT: 146 LBS | BODY MASS INDEX: 24.92 KG/M2 | SYSTOLIC BLOOD PRESSURE: 117 MMHG | DIASTOLIC BLOOD PRESSURE: 67 MMHG

## 2020-06-18 DIAGNOSIS — M53.3 COCCYDYNIA: Primary | ICD-10-CM

## 2020-06-18 DIAGNOSIS — M54.50 LOW BACK PAIN WITHOUT SCIATICA, UNSPECIFIED BACK PAIN LATERALITY, UNSPECIFIED CHRONICITY: ICD-10-CM

## 2020-06-18 PROCEDURE — 99999 PR PBB SHADOW E&M-EST. PATIENT-LVL III: CPT | Mod: PBBFAC,,, | Performed by: PHYSICAL MEDICINE & REHABILITATION

## 2020-06-18 PROCEDURE — 99243 PR OFFICE CONSULTATION,LEVEL III: ICD-10-PCS | Mod: S$GLB,,, | Performed by: PHYSICAL MEDICINE & REHABILITATION

## 2020-06-18 PROCEDURE — 99243 OFF/OP CNSLTJ NEW/EST LOW 30: CPT | Mod: S$GLB,,, | Performed by: PHYSICAL MEDICINE & REHABILITATION

## 2020-06-18 PROCEDURE — 99999 PR PBB SHADOW E&M-EST. PATIENT-LVL III: ICD-10-PCS | Mod: PBBFAC,,, | Performed by: PHYSICAL MEDICINE & REHABILITATION

## 2020-06-18 NOTE — LETTER
June 18, 2020      Molly Cunningham MD  [address]  [place] [address]           [facility] [address]  [address], [address]  [facility] [address]  Phone: [phone]  Fax: [phone]          Patient: Ada Ramirez   MR Number: [id number]   YOB: 1990   Date of Visit: 6/18/2020       Dear Dr. Molly Cunningham:    Thank you for referring Ada Ramirez to me for evaluation. Attached you will find relevant portions of my assessment and plan of care.    If you have questions, please do not hesitate to call me. I look forward to following Ada Ramirez along with you.    Sincerely,    Shilpa Trujillo MD    Enclosure  CC:  No Recipients    If you would like to receive this communication electronically, please contact [email or web address] or [phone] to request more information on [facility] Link access.    For providers and/or their staff who would like to refer a patient to [facility], please contact us through our one-stop-shop provider referral line, [facility], [place], at [phone].    If you feel you have received this communication in error or would no longer like to receive these types of communications, please e-mail [email or web address]

## 2020-06-24 ENCOUNTER — TELEPHONE (OUTPATIENT)
Dept: PAIN MEDICINE | Facility: CLINIC | Age: 30
End: 2020-06-24

## 2020-06-24 DIAGNOSIS — Z01.818 PREOP TESTING: Primary | ICD-10-CM

## 2020-06-24 DIAGNOSIS — M53.3 COCCYDYNIA: Primary | ICD-10-CM

## 2020-07-06 ENCOUNTER — PATIENT OUTREACH (OUTPATIENT)
Dept: ADMINISTRATIVE | Facility: OTHER | Age: 30
End: 2020-07-06

## 2020-07-07 ENCOUNTER — LAB VISIT (OUTPATIENT)
Dept: SURGERY | Facility: CLINIC | Age: 30
End: 2020-07-07
Payer: COMMERCIAL

## 2020-07-07 DIAGNOSIS — Z01.818 PREOP TESTING: ICD-10-CM

## 2020-07-07 LAB — SARS-COV-2 RNA RESP QL NAA+PROBE: NOT DETECTED

## 2020-07-07 PROCEDURE — U0003 INFECTIOUS AGENT DETECTION BY NUCLEIC ACID (DNA OR RNA); SEVERE ACUTE RESPIRATORY SYNDROME CORONAVIRUS 2 (SARS-COV-2) (CORONAVIRUS DISEASE [COVID-19]), AMPLIFIED PROBE TECHNIQUE, MAKING USE OF HIGH THROUGHPUT TECHNOLOGIES AS DESCRIBED BY CMS-2020-01-R: HCPCS

## 2020-07-09 ENCOUNTER — HOSPITAL ENCOUNTER (OUTPATIENT)
Facility: OTHER | Age: 30
Discharge: HOME | End: 2020-07-09
Attending: ANESTHESIOLOGY | Admitting: ANESTHESIOLOGY
Payer: COMMERCIAL

## 2020-07-09 VITALS
HEIGHT: 64 IN | RESPIRATION RATE: 14 BRPM | WEIGHT: 145 LBS | HEART RATE: 81 BPM | DIASTOLIC BLOOD PRESSURE: 61 MMHG | TEMPERATURE: 99 F | SYSTOLIC BLOOD PRESSURE: 137 MMHG | OXYGEN SATURATION: 100 % | BODY MASS INDEX: 24.75 KG/M2

## 2020-07-09 DIAGNOSIS — G89.29 CHRONIC PAIN: ICD-10-CM

## 2020-07-09 DIAGNOSIS — M53.3 COCCYDYNIA: Primary | ICD-10-CM

## 2020-07-09 PROCEDURE — 63600175 PHARM REV CODE 636 W HCPCS: Performed by: ANESTHESIOLOGY

## 2020-07-09 PROCEDURE — 64999 PR GANGLION IMPAR INJECTION: ICD-10-PCS | Mod: ,,, | Performed by: ANESTHESIOLOGY

## 2020-07-09 PROCEDURE — 25000003 PHARM REV CODE 250: Performed by: ANESTHESIOLOGY

## 2020-07-09 PROCEDURE — 64999 UNLISTED PX NERVOUS SYSTEM: CPT

## 2020-07-09 PROCEDURE — 20550 NJX 1 TENDON SHEATH/LIGAMENT: CPT

## 2020-07-09 PROCEDURE — 20605 PR DRAIN/INJECT INTERMEDIATE JOINT/BURSA: ICD-10-PCS | Mod: ,,, | Performed by: ANESTHESIOLOGY

## 2020-07-09 PROCEDURE — 64999 UNLISTED PX NERVOUS SYSTEM: CPT | Mod: ,,, | Performed by: ANESTHESIOLOGY

## 2020-07-09 PROCEDURE — 77002 PR FLUOROSCOPIC GUIDANCE NEEDLE PLACEMENT: ICD-10-PCS | Mod: 26,,, | Performed by: ANESTHESIOLOGY

## 2020-07-09 PROCEDURE — 25500020 PHARM REV CODE 255: Performed by: ANESTHESIOLOGY

## 2020-07-09 PROCEDURE — 20605 DRAIN/INJ JOINT/BURSA W/O US: CPT | Mod: ,,, | Performed by: ANESTHESIOLOGY

## 2020-07-09 PROCEDURE — 77002 NEEDLE LOCALIZATION BY XRAY: CPT | Mod: 26,,, | Performed by: ANESTHESIOLOGY

## 2020-07-09 RX ORDER — TRIAMCINOLONE ACETONIDE 40 MG/ML
INJECTION, SUSPENSION INTRA-ARTICULAR; INTRAMUSCULAR
Status: DISCONTINUED | OUTPATIENT
Start: 2020-07-09 | End: 2020-07-09 | Stop reason: HOSPADM

## 2020-07-09 RX ORDER — SODIUM CHLORIDE 9 MG/ML
500 INJECTION, SOLUTION INTRAVENOUS CONTINUOUS
Status: DISCONTINUED | OUTPATIENT
Start: 2020-07-09 | End: 2020-07-09 | Stop reason: HOSPADM

## 2020-07-09 RX ORDER — LIDOCAINE HYDROCHLORIDE 10 MG/ML
INJECTION INFILTRATION; PERINEURAL
Status: DISCONTINUED | OUTPATIENT
Start: 2020-07-09 | End: 2020-07-09 | Stop reason: HOSPADM

## 2020-07-09 RX ORDER — MIDAZOLAM HYDROCHLORIDE 1 MG/ML
INJECTION INTRAMUSCULAR; INTRAVENOUS
Status: DISCONTINUED | OUTPATIENT
Start: 2020-07-09 | End: 2020-07-09 | Stop reason: HOSPADM

## 2020-07-09 RX ORDER — FENTANYL CITRATE 50 UG/ML
INJECTION, SOLUTION INTRAMUSCULAR; INTRAVENOUS
Status: DISCONTINUED | OUTPATIENT
Start: 2020-07-09 | End: 2020-07-09 | Stop reason: HOSPADM

## 2020-07-09 RX ORDER — BUPIVACAINE HYDROCHLORIDE 2.5 MG/ML
INJECTION, SOLUTION EPIDURAL; INFILTRATION; INTRACAUDAL
Status: DISCONTINUED | OUTPATIENT
Start: 2020-07-09 | End: 2020-07-09 | Stop reason: HOSPADM

## 2020-07-09 NOTE — DISCHARGE SUMMARY
Discharge Note  Short Stay      SUMMARY     Admit Date: 7/9/2020    Attending Physician: Merlyn Carl      Discharge Physician: Merlyn Carl      Discharge Date: 7/9/2020 11:06 AM    Procedure(s) (LRB):  INJECTION, NUCLEUS OF IMPAR INJECTION DIRECT REFERRAL (N/A)    Final Diagnosis: Coccydynia [M53.3]    Disposition: Home or self care    Patient Instructions:   Current Discharge Medication List      CONTINUE these medications which have NOT CHANGED    Details   boric acid (BORIC ACID) vaginal suppository insert 1 suppository vaginally 1-2 times weekly      busPIRone (BUSPAR) 5 MG Tab Take 1 tablet (5 mg total) by mouth 2 (two) times daily.  Qty: 60 tablet, Refills: 5      fexofenadine (ALLEGRA) 180 MG tablet Take 180 mg by mouth once daily.      hydrocortisone 2.5 % cream Apply topically 2 (two) times daily. for 10 days  Qty: 28 g, Refills: 0      valACYclovir (VALTREX) 500 MG tablet TK 1 T PO D  Refills: 3                 Discharge Diagnosis: Coccydynia [M53.3]  Condition on Discharge: Stable with no complications to procedure   Diet on Discharge: Same as before.  Activity: as per instruction sheet.  Discharge to: Home with a responsible adult.  Follow up: 2-4 weeks       Please call my office or pager at [phone] if experienced any weakness or loss of sensation, fever > 101.5, pain uncontrolled with oral medications, persistent nausea/vomiting/or diarrhea, redness or drainage from the incisions, or any other worrisome concerns. If physician on call was not reached or could not communicate with our office for any reason please go to the nearest emergency department

## 2020-07-09 NOTE — OP NOTE
1. Sacrococcygeal Ligament/Nucleus Impar Injection   Time-out taken to identify patient and procedure side prior to starting the procedure.   I attest that I have reviewed the patient's home medications prior to the procedure and no contraindication have been identified. I  re-evaluated the patient after the patient was positioned for the procedure in the procedure room immediately before the procedural time-out. The vital signs are current and represent the current state of the patient which has not significantly changed since the preprocedure assessment.              Date of Service: 07/09/2020    PCP: Molly Cunningham MD    Referring Physician:                                                        PROCEDURE:    1. Nucleus impar injection under fluoroscopy  2. Sacrococcygeal ligament injection under fluoroscopy    REASON FOR PROCEDURE:  Coccydynia [M53.3]  POSTPROCEDURE DIAGNOSIS:   Coccydynia [M53.3]    1. Coccydynia    2. Chronic pain           PHYSICIAN: Merlyn Carl MD  ASSISTANTS:Rafaela Georges MD Fellow       MEDICATIONS INJECTED:  Kenalog 20mg and Bupivacaine 0.25% 3ml.  Half of this mixture was injected at the nucleus impar and the other half was injected at the sacrococcygeal ligament.  LOCAL ANESTHETIC INJECTED:  Xylocaine 1% 9ml with Sodium Bicarbonate 1ml  SEDATION MEDICATIONS: local/IV sedation: Versed 2 mg and fentanyl 25 mcg IV.  Conscious sedation ordered by MD.  Patient reevaluated and sedation administered by MD and monitored by RN.  Total sedation time was less than 5 min. (See nurse documentation and case log for sedation time)     ESTIMATED BLOOD LOSS:  None.  COMPLICATIONS:  None.    TECHNIQUE:   Laying in the prone position the area was prepped and draped in the usual sterile fashion using ChloraPrep and fenestrated drape.  A 26-gauge spinal needle was introduced into the desired position at the sacrococcygeal area.  It was introduced thru the ligament to reach the anterior edge of that  space.   Omnipaque was injected to confirm appropriate placement and that there was no vascular runoff.  Half of the medication was injected.  The needle was then retracted up to the sacrococcygeal ligament area.  Negative pressure applied to make sure no intravascular placement.  Omnipaque was injected gain to confirm appropriate placement.  The other half of the medication was injected.  The patient tolerated the procedure well.    PAIN BEFORE THE PROCEDURE:  8/10.    PAIN AFTER THE PROCEDURE:  0/10.    The patient was monitored after the procedure.  Patient was given post procedure and discharge instructions to follow at home.  We will see the patient back in two weeks or the patient may call to inform of status. The patient was discharged in a stable condition

## 2020-07-09 NOTE — H&P
"HPI  Patient presenting for Procedure(s) (LRB):  INJECTION, NUCLEUS OF IMPAR INJECTION DIRECT REFERRAL (N/A)     Patient on Anti-coagulation No    No health changes since previous encounter    Past Medical History:   Diagnosis Date    Allergy      Past Surgical History:   Procedure Laterality Date    ADENOIDECTOMY      TONSILLECTOMY       Review of patient's allergies indicates:   Allergen Reactions    Cephalosporins Rash    Penicillins Rash    Sulfa (sulfonamide antibiotics) Rash    Corn containing products Diarrhea and Nausea And Vomiting    Eggs [egg derived] Nausea Only    Latex, natural rubber Dermatitis and Rash      Current Facility-Administered Medications   Medication    0.9%  NaCl infusion       PMHx, PSHx, Allergies, Medications reviewed in epic    ROS negative except pain complaints in HPI    OBJECTIVE:    /75 (BP Location: Right arm, Patient Position: Sitting)   Pulse 82   Temp 98.6 °F (37 °C) (Oral)   Resp 16   Ht 5' 4" (1.626 m)   Wt 65.8 kg (145 lb)   LMP 06/17/2020   SpO2 100%   BMI 24.89 kg/m²     PHYSICAL EXAMINATION:    GENERAL: Well appearing, in no acute distress, alert and oriented x3.  PSYCH:  Mood and affect appropriate.  SKIN: Skin color, texture, turgor normal, no rashes or lesions which will impact the procedure.  CV: RRR with palpation of the radial artery.  PULM: No evidence of respiratory difficulty, symmetric chest rise. Clear to auscultation.  NEURO: Cranial nerves grossly intact.    Plan:    Proceed with procedure as planned Procedure(s) (LRB):  INJECTION, NUCLEUS OF IMPAR INJECTION DIRECT REFERRAL (N/A)    Aubrey Georges  07/09/2020            "

## 2020-07-09 NOTE — DISCHARGE INSTRUCTIONS
Thank you for allowing us to care for you today. You may receive a survey about the care we provided. Your feedback is valuable and helps us provide excellent care throughout the community.     Home Care Instructions for Pain Management:    1. DIET:   You may resume your normal diet today.   2. BATHING:   You may shower with luke warm water. No tub baths or anything that will soak injection sites under water for the next 24 hours.  3. DRESSING:   You may remove your bandage today.   4. ACTIVITY LEVEL:   You may resume your normal activities 24 hrs after your procedure. Nothing strenuous today.  5. MEDICATIONS:   You may resume your normal medications today. To restart blood thinners, ask your doctor.  6. DRIVING    If you have received any sedatives by mouth today, you may not drive for 12 hours.    If you have received any sedation through your IV, you may not drive for 24 hrs.   7. SPECIAL INSTRUCTIONS:   No heat to the injection site for 24 hrs including, hot bath or shower, heating pad, moist heat, or hot tubs.    Use ice pack to injection site for any pain or discomfort.  Apply ice packs for 20 minute intervals as needed.    IF you have diabetes, be sure to monitor your blood sugar more closely. IF your injection contained steroids your blood sugar levels may become higher than normal.    If you are still having pain upon discharge:  Your pain may improve over the next 48 hours. The anesthetic (numbing medication) works immediately to 48 hours. IF your injection contained a steroid (anti-inflammatory medication), it takes approximately 3 days to start feeling relief and 7-10 days to see your greatest results from the medication. It is possible you may need subsequent injections. This would be discussed at your follow up appointment with pain management or your referring doctor.    Please call the PAIN MANAGEMENT office at [phone] or ON CALL pager at [phone] if you experienced any:   -Weakness or  loss of sensation  -Fever > 101.5  -Pain uncontrolled with oral medications   -Persistent nausea, vomiting, or diarrhea  -Redness or drainage from the injection sites, or any other worrisome concerns.   If physician on call was not reached or could not communicate with our office for any reason please go to the nearest emergency department.    Recovery After Procedural Sedation (Adult)   You have been given medicine by vein to make you sleep during your surgery. This may have included both a pain medicine and sleeping medicine. Most of the effects have worn off. But you may still have some drowsiness for the next 6 to 8 hours.  Home care  Follow these guidelines when you get home:  · For the next 8 hours, you should be watched by a responsible adult. This person should make sure your condition is not getting worse.  · Don't drink any alcohol for the next 24 hours.  · Don't drive, operate dangerous machinery, or make important business or personal decisions during the next 24 hours.  · To prevent injury or falls, use caution when standing and walking for at least 24 hours after your procedure.  Note: Your healthcare provider may tell you not to take any medicine by mouth for pain or sleep in the next 4 hours. These medicines may react with the medicines you were given in the hospital. This could cause a much stronger response than usual.  Follow-up care  Follow up with your healthcare provider if you are not alert and back to your usual level of activity within 12 hours.  When to seek medical advice  Call your healthcare provider right away if any of these occur:  · Drowsiness gets worse  · Weakness or dizziness gets worse  · Repeated vomiting  · You can't be awakened  · Fever  · New rash  [facility] last reviewed this educational content on 9/1/2019 © 2000-2020 [employer]. [address] [place] [address], [place] [address]. All rights reserved. This information is not intended as a substitute for professional  medical care. Always follow your healthcare professional's instructions.

## 2020-07-13 NOTE — PROGRESS NOTES
CRS Office Visit History and Physical      SUBJECTIVE:     Chief Complaint: Anal itching    History of Present Illness:  Patient is a 29 y.o. female presents for follow-up of anal itching.  Last seen by me at the end of May.  At that time we discussed conservative management including stopping steroid cream, starting Calmoseptine, diet elimination, and fiber.  She has also been diagnosed with coccydynia with radiation down her leg, and is currently seeing  for this.   She had a block performed by the pain team 4 days ago.  In hindsight, she does think that her anal itching is at least partially positional, and worse when she is lying on her left side.  She thinks that the itching is better, especially after the injection, although it does wax and wane.  Calmoseptine has been the most helpful for her.    Review of patient's allergies indicates:   Allergen Reactions    Cephalosporins Rash    Penicillins Rash    Sulfa (sulfonamide antibiotics) Rash    Corn containing products Diarrhea and Nausea And Vomiting    Eggs [egg derived] Nausea Only    Latex, natural rubber Dermatitis and Rash       Past Medical History:   Diagnosis Date    Allergy      Past Surgical History:   Procedure Laterality Date    ADENOIDECTOMY      TONSILLECTOMY       Family History   Problem Relation Age of Onset    Fibromyalgia Mother     Hypertension Father      Social History     Tobacco Use    Smoking status: Light Tobacco Smoker     Types: Cigarettes    Smokeless tobacco: Never Used   Substance Use Topics    Alcohol use: Yes     Frequency: 2-4 times a month     Drinks per session: 1 or 2     Binge frequency: Never     Comment: once week     Drug use: Never        Review of Systems:  Review of Systems   Gastrointestinal: Negative for abdominal pain, blood in stool, constipation and diarrhea.   All other systems reviewed and are negative.      OBJECTIVE:     Vital Signs (Most Recent)  BP 92/72 (BP Location: Left arm,  "Patient Position: Sitting, BP Method: Large (Automatic))   Pulse 98   Ht 5' 4" (1.626 m)   Wt 67.1 kg (147 lb 14.9 oz)   LMP 06/17/2020   BMI 25.39 kg/m²     Physical Exam:  General: White female in no distress   Neuro: Alert and oriented x 4.  Moves all extremities.     HEENT: No icterus.  Trachea midline  Respiratory: Respirations are even and unlabored  Cardiac: Regular rate  Extremities: Warm dry and intact  Skin: No rashes    Anorectal:   External exam:  Skin abrasion seen at last appointment have resolved, no current evidence of skin excoriation or lichenization        ASSESSMENT/PLAN:     29-year-old female with pruritus ani    Symptoms today more consistent with neuropathic itching rather than anorectal or skin based source.  Okay to continue Calmoseptine, as we discussed that the mental can sometimes be helpful for these symptoms.  Encouraging that she has improved with spinal block.  Also discussed consideration of gabapentin if no improvement, although I will defer to the Spine and Pain teams for this.    Stormy Vazquez MD  Staff Surgeon, Colon and Rectal Surgery  [facility]    "

## 2020-07-14 ENCOUNTER — OFFICE VISIT (OUTPATIENT)
Dept: SURGERY | Facility: CLINIC | Age: 30
End: 2020-07-14
Attending: COLON & RECTAL SURGERY
Payer: COMMERCIAL

## 2020-07-14 VITALS
SYSTOLIC BLOOD PRESSURE: 92 MMHG | HEART RATE: 98 BPM | BODY MASS INDEX: 25.25 KG/M2 | DIASTOLIC BLOOD PRESSURE: 72 MMHG | WEIGHT: 147.94 LBS | HEIGHT: 64 IN

## 2020-07-14 DIAGNOSIS — L29.0 ANAL ITCHING: Primary | ICD-10-CM

## 2020-07-14 PROCEDURE — 3008F PR BODY MASS INDEX (BMI) DOCUMENTED: ICD-10-PCS | Mod: CPTII,S$GLB,, | Performed by: COLON & RECTAL SURGERY

## 2020-07-14 PROCEDURE — 99999 PR PBB SHADOW E&M-EST. PATIENT-LVL III: CPT | Mod: PBBFAC,,, | Performed by: COLON & RECTAL SURGERY

## 2020-07-14 PROCEDURE — 99999 PR PBB SHADOW E&M-EST. PATIENT-LVL III: ICD-10-PCS | Mod: PBBFAC,,, | Performed by: COLON & RECTAL SURGERY

## 2020-07-14 PROCEDURE — 99213 OFFICE O/P EST LOW 20 MIN: CPT | Mod: S$GLB,,, | Performed by: COLON & RECTAL SURGERY

## 2020-07-14 PROCEDURE — 3008F BODY MASS INDEX DOCD: CPT | Mod: CPTII,S$GLB,, | Performed by: COLON & RECTAL SURGERY

## 2020-07-14 PROCEDURE — 99213 PR OFFICE/OUTPT VISIT, EST, LEVL III, 20-29 MIN: ICD-10-PCS | Mod: S$GLB,,, | Performed by: COLON & RECTAL SURGERY

## 2020-07-14 NOTE — LETTER
July 14, 2020      Molly Cunningham MD  [address]  [place] [address]           [facility]  [address]  [facility] [address]  Phone: [phone]          Patient: Ada Ramirez   MR Number: [id number]   YOB: 1990   Date of Visit: 7/14/2020       Dear Dr. Molly Cunningham:    Thank you for referring Ada Ramirez to me for evaluation. Attached you will find relevant portions of my assessment and plan of care.    If you have questions, please do not hesitate to call me. I look forward to following Ada Ramirez along with you.    Sincerely,    Stormy Vazquez MD    Enclosure  CC:  No Recipients    If you would like to receive this communication electronically, please contact [email or web address] or [phone] to request more information on [employer] Link access.    For providers and/or their staff who would like to refer a patient to [facility], please contact us through our one-stop-shop provider referral line, [facility], at [phone].    If you feel you have received this communication in error or would no longer like to receive these types of communications, please e-mail [email or web address]

## 2020-08-03 RX ORDER — BUSPIRONE HYDROCHLORIDE 5 MG/1
5 TABLET ORAL 2 TIMES DAILY
Qty: 60 TABLET | Refills: 5 | Status: SHIPPED | OUTPATIENT
Start: 2020-08-03 | End: 2021-08-03

## 2020-08-13 ENCOUNTER — PATIENT MESSAGE (OUTPATIENT)
Dept: INTERNAL MEDICINE | Facility: CLINIC | Age: 30
End: 2020-08-13

## 2020-08-15 ENCOUNTER — OFFICE VISIT (OUTPATIENT)
Dept: URGENT CARE | Facility: CLINIC | Age: 30
End: 2020-08-15
Payer: COMMERCIAL

## 2020-08-15 VITALS
HEART RATE: 67 BPM | SYSTOLIC BLOOD PRESSURE: 123 MMHG | DIASTOLIC BLOOD PRESSURE: 84 MMHG | OXYGEN SATURATION: 99 % | HEIGHT: 64 IN | WEIGHT: 147 LBS | RESPIRATION RATE: 20 BRPM | BODY MASS INDEX: 25.1 KG/M2 | TEMPERATURE: 99 F

## 2020-08-15 DIAGNOSIS — K52.9 GASTROENTERITIS: ICD-10-CM

## 2020-08-15 DIAGNOSIS — R51.9 ACUTE NONINTRACTABLE HEADACHE, UNSPECIFIED HEADACHE TYPE: Primary | ICD-10-CM

## 2020-08-15 PROCEDURE — 99214 OFFICE O/P EST MOD 30 MIN: CPT | Mod: S$GLB,,, | Performed by: PHYSICIAN ASSISTANT

## 2020-08-15 PROCEDURE — 99214 PR OFFICE/OUTPT VISIT, EST, LEVL IV, 30-39 MIN: ICD-10-PCS | Mod: S$GLB,,, | Performed by: PHYSICIAN ASSISTANT

## 2020-08-15 RX ORDER — DICYCLOMINE HYDROCHLORIDE 20 MG/1
20 TABLET ORAL EVERY 6 HOURS PRN
Qty: 30 TABLET | Refills: 0 | Status: SHIPPED | OUTPATIENT
Start: 2020-08-15 | End: 2020-09-14

## 2020-08-15 RX ORDER — ONDANSETRON 4 MG/1
4 TABLET, ORALLY DISINTEGRATING ORAL EVERY 6 HOURS PRN
Qty: 20 TABLET | Refills: 0 | Status: SHIPPED | OUTPATIENT
Start: 2020-08-15 | End: 2020-09-29

## 2020-08-15 NOTE — PROGRESS NOTES
"Subjective:       Patient ID: Ada Ramirez is a 29 y.o. female.    Vitals:  height is 5' 4" (1.626 m) and weight is 66.7 kg (147 lb). Her temperature is 98.5 °F (36.9 °C). Her blood pressure is 123/84 and her pulse is 67. Her respiration is 20 and oxygen saturation is 99%.     Chief Complaint: Nausea, Fatigue, and Headache    Pt c/o nausea and headache for the past 4 days.  Associated symptoms include diarrhea and one episode of vomiting.  She states she was tested for COVID this past week and was negative.  LMP was 8/8.  Denies body aches, chills, fever, bloody diarrhea, or recent antibiotic use.  Pt reports that her last episode of diarrhea was today.  She has taken Excedrin and noticed minimal relief from headache.      Nausea  This is a new problem. The current episode started in the past 7 days (Thursday). The problem occurs constantly. Associated symptoms include fatigue, headaches, nausea and vomiting. Pertinent negatives include no chest pain, chills, congestion, coughing, diaphoresis, fever, myalgias, neck pain, rash, sore throat, vertigo or weakness. Nothing aggravates the symptoms.   Fatigue  This is a new problem. The current episode started in the past 7 days. Associated symptoms include fatigue, headaches, nausea and vomiting. Pertinent negatives include no chest pain, chills, congestion, coughing, diaphoresis, fever, myalgias, neck pain, rash, sore throat, vertigo or weakness.   Headache   This is a new problem. The current episode started in the past 7 days. The pain is located in the bilateral region. The pain does not radiate. The pain quality is similar to prior headaches. The pain is at a severity of 7/10. The pain is moderate. Associated symptoms include nausea and vomiting. Pertinent negatives include no blurred vision, coughing, dizziness, ear pain, eye pain, eye redness, fever, loss of balance, neck pain, photophobia, sinus pressure, sore throat, tinnitus or weakness. There is no history of " migraine headaches.       Constitution: Positive for fatigue. Negative for chills, sweating and fever.   HENT: Negative for ear pain, tinnitus, facial swelling, congestion, sinus pain, sinus pressure, sore throat and voice change.    Neck: Negative for neck pain, neck stiffness and painful lymph nodes.   Cardiovascular: Negative for chest pain.   Eyes: Negative for eye pain, eye redness, photophobia, vision loss, double vision and blurred vision.   Respiratory: Negative for chest tightness, cough, sputum production, bloody sputum, COPD, shortness of breath, stridor, wheezing and asthma.    Gastrointestinal: Positive for nausea and vomiting.   Genitourinary: Negative for missed menses.   Musculoskeletal: Negative for trauma and muscle ache.   Skin: Negative for rash, wound and lesion.   Allergic/Immunologic: Negative for seasonal allergies and asthma.   Neurological: Positive for headaches. Negative for dizziness, history of vertigo, light-headedness, facial drooping, speech difficulty, coordination disturbances, loss of balance, history of migraines, disorientation and loss of consciousness.   Hematologic/Lymphatic: Negative for swollen lymph nodes.   Psychiatric/Behavioral: Negative for disorientation, confusion, nervous/anxious, sleep disturbance and depression. The patient is not nervous/anxious.        Objective:      Physical Exam   Constitutional: She is oriented to person, place, and time. She appears well-developed.   HENT:   Head: Normocephalic and atraumatic.   Ears:   Right Ear: Hearing, tympanic membrane, external ear and ear canal normal.   Left Ear: Hearing, tympanic membrane, external ear and ear canal normal.   Nose: Nose normal.   Eyes: Conjunctivae and lids are normal.   Neck: Trachea normal and full passive range of motion without pain. Neck supple.   Cardiovascular: Normal rate, regular rhythm and normal heart sounds.   Pulmonary/Chest: Effort normal and breath sounds normal. No stridor. No  respiratory distress. She has no decreased breath sounds. She has no wheezes. She has no rhonchi. She has no rales.   Abdominal: Soft. Normal appearance and bowel sounds are normal. She exhibits no distension, no abdominal bruit, no pulsatile midline mass and no mass. There is no abdominal tenderness. There is no rebound, no guarding, no left CVA tenderness and no right CVA tenderness.   Musculoskeletal: Normal range of motion.   Neurological: She is alert and oriented to person, place, and time. She has normal strength.      Comments: CN's grossly intact   Skin: Skin is warm, dry, intact, not diaphoretic and not pale. Psychiatric: Her speech is normal and behavior is normal. Judgment and thought content normal.   Nursing note and vitals reviewed.        Assessment:       1. Acute nonintractable headache, unspecified headache type    2. Gastroenteritis        Plan:         Acute nonintractable headache, unspecified headache type    Gastroenteritis  -     ondansetron (ZOFRAN-ODT) 4 MG TbDL; Take 1 tablet (4 mg total) by mouth every 6 (six) hours as needed.  Dispense: 20 tablet; Refill: 0  -     dicyclomine (BENTYL) 20 mg tablet; Take 1 tablet (20 mg total) by mouth every 6 (six) hours as needed.  Dispense: 30 tablet; Refill: 0           Patient Instructions       Noninfectious Gastroenteritis (Ages 6 Years to Adult)    Gastroenteritis can cause nausea, vomiting, diarrhea, and abdominal cramping. This may occur as a result of food sensitivity, inflammation of your gastrointestinal tract, medicines, stress, or other causes not related to infection. Your symptoms will usually last from 1 to 3 days, but can last longer. Antibiotics are not effective, but simple home treatment will be helpful.  Home care  Medicine  · You may use acetaminophen or NSAID medicines like ibuprofen or naproxen to control fever, unless another medicine is prescribed. (Note: If you have chronic liver or kidney disease, or ever had a stomach ulcer  or gastrointestinalI bleeding, talk with your healthcare provider before using these medicines.) Aspirin should never be used in anyone under 18 years of age who is ill with a fever. It may cause severe liver damage. Don't increase your NSAID medicines if you are already taking these medicines for another condition (like arthritis). Don't use NSAIDS if you are on aspirin (such as for heart disease, or after a stroke).  · If medicines for diarrhea or vomiting are prescribed, take only as directed.  General care and preventing spread of the illness  · If symptoms are severe, rest at home for the next 24 hours or until you feel better.  · Hand washing with soap and water is the best way to prevent the spread of infection. Wash your hands after touching anyone who is sick.  · Wash your hands after using the toilet and before meals. Clean the toilet after each use.  · Caffeine, tobacco, and alcohol can make your diarrhea, cramping, and pain worse.  Diet  · Water and clear liquids are important so you do not get dehydrated. Drink a small amount at a time.  · Do not force yourself to eat, especially if you have cramps, vomiting, or diarrhea. When you finally decide to start eating, do not eat large amounts at a time, even if you are hungry.  · If you eat, avoid fatty, greasy, spicy, or fried foods.  · Do not eat dairy products if you have diarrhea; they can make the diarrhea worse.  During the first 24 hours (the first full day), follow the diet below:  · Beverages: Water, clear liquids, soft drinks without caffeine, like ginger ale; mineral water (plain or flavored); decaffeinated tea and coffee.  · Soups: Clear broth, consommé, and bouillon Sports drinks aren't a good choice because they have too much sugar and not enough electrolytes. In this case, commercially available products called oral rehydration solutions are best.  · Desserts: Plain gelatin, popsicles, and fruit juice bars.  During the next 24 hours (the second  day), you may add the following to the above if you have improved. If not, continue what you did the first day:  · Hot cereal, plain toast, bread, rolls, crackers  · Plain noodles, rice, mashed potatoes, chicken noodle or rice soup  · Unsweetened canned fruit (avoid pineapple), bananas  · Limit caffeine and chocolate. No spices or seasonings except salt.  During the next 24 hours  · Gradually resume a normal diet, as you feel better and your symptoms improve.  · If at any time your symptoms start getting worse, go back to clear liquids until you feel better.  Food preparation  · If you have diarrhea, you should not prepare food for others. When you  prepare food for yourself, wash your hands before and after.  · Wash your hands after using cutting boards, countertops, and knives that have been in contact with raw food.  · Keep uncooked meats away from cooked and ready-to-eat foods.  Follow-up care  Follow up with your healthcare provider if you are not improving over the next 2 to 3 days, or as advised. If a stool (diarrhea) sample was taken, call for the results as directed.  When to seek medical care  Call your healthcare provider right away if any of these occur:   · Increasing abdominal pain or constant lower right abdominal pain  · Continued vomiting (unable to keep liquids down)  · Frequent diarrhea (more than 5 times a day)  · Blood in vomit or stool (black or red color)  · Inability to tolerate solid food after a few days.  · Dark urine, reduced urine output  · Weakness, dizziness  · Drowsiness  · Fever of 100.4ºF (38.0ºC) or higher, or as directed by your healthcare provider  · New rash  Call 911  Call 911 if any of these occur:  · Trouble breathing  · Chest pain  · Confusion  · Severe drowsiness or trouble awakening  · Seizure  · Stiff neck  Date Last Reviewed: 11/16/2015  © 8274-4833 [employer]. [address] [place] [address], [place] [address]. All rights reserved. This information is not intended  "as a substitute for professional medical care. Always follow your healthcare professional's instructions.        Headache, Unspecified    A number of things can cause headaches. The cause of your headache isnt clear. But it doesnt seem to be a sign of any serious illness.  You could have a tension headache or a migraine headache.  Stress can cause a tension headache. This can happen if you tense the muscles of your shoulders, neck, and scalp without knowing it. If this stress lasts long enough, you may develop a tension headache.  It is not clear why migraines occur, but certain things called" triggers" can raise the risk of having a migraine attack. Migraine triggers may include emotional stress or depression, or by hormone changes during the menstrual cycle. Other triggers include birth control pills and other medicines, alcohol or caffeine, foods with tyramine (such as aged cheese, wine), eyestrain, weather changes, missed meals, and lack of sleep or oversleeping.  Other causes of headache include:  · Viral illness with high fever  · Head injury with concussion  · Sinus, ear, or throat infection  · Dental pain and jaw joint (TMJ) pain  More serious but less common causes of headache include stroke, brain hemorrhage, brain tumor, meningitis, and encephalitis.  Home care  Follow these tips when taking care of yourself at home:  · Dont drive yourself home if you were given pain medicine for your headache. Instead, have someone else drive you home. Try to sleep when you get home. You should feel much better when you wake up.  · Apply heat to the back of your neck to ease a neck muscle spasm. Take care of a migraine headache by putting an ice pack on your forehead or at the base of your skull.  · If you have nausea or vomiting, eat a light diet until your headache eases.  · If you have a migraine headache, use sunglasses when in the daylight or around bright indoor lighting until your symptoms get better. Bright " glaring light can make this type of headache worse.  Follow-up care  Follow up with your healthcare provider, or as advised. Talk with your provider if you have frequent headaches. He or she can help figure out a treatment plan. By knowing the earliest signs of headache, and starting treatment right away, you may be able to stop the pain yourself.  When to seek medical advice  Call your healthcare provider right away if any of these occur:  · Your head pain suddenly gets worse after sexual intercourse or strenuous activity  · Your head pain doesnt get better within 24 hours  · You arent able to keep liquids down (repeated vomiting)  · Fever of 100.4ºF (38ºC) or higher, or as directed by your healthcare provider  · Stiff neck  · Extreme drowsiness, confusion, or fainting  · Dizziness or dizziness with spinning sensation (vertigo)  · Weakness in an arm or leg or one side of your face  · You have trouble talking or seeing  Date Last Reviewed: 8/1/2016  © 3114-4768 [employer]. [address] [place] [address]. All rights reserved. This information is not intended as a substitute for professional medical care. Always follow your healthcare professional's instructions.        Self-Care for Headaches  Most headaches aren't serious and can be relieved with self-care. But some headaches may be a sign of another health problem like eye trouble or high blood pressure. To find the best treatment, learn what kind of headaches you get. For tension headaches, self-care will usually help. To treat migraines, ask your healthcare provider for advice. It is also possible to get both tension and migraine headaches. Self-care involves relieving the pain and avoiding headache triggers if you can.    Ways to reduce pain and tension  Try these steps:  · Apply a cold compress or ice pack to the pain site.  · Drink fluids. If nausea makes it hard to drink, try sucking on ice.  · Rest. Protect yourself from bright  "light and loud noises.  · Calm your emotions by imagining a peaceful scene.  · Massage tight neck, shoulder, and head muscles.  · To relax muscles, soak in a hot bath or use a hot shower.  Use medicines  Aspirin or aspirin substitutes, such as ibuprofen and acetaminophen, can relieve headache. Remember: Never give aspirin to anyone 18 years old or younger because of the risk of developing Reye syndrome. Use pain medicines only when necessary.  Track your headaches  Keeping a headache diary can help you and your healthcare provider identify what's causing your headaches:  · Note when each headache happens.  · Identify your activities and the foods you've eaten 6 to 8 hours before the headache began.  · Look for any trends or "triggers."  Signs of tension headache  Any of the following can be signs:  · Dull pain or feeling of pressure in a tight band around your head  · Pain in your neck or shoulders  · Headache without a definite beginning or end  · Headache after an activity such as driving or working on a computer  Signs of migraine  Any of the following can be signs:  · Throbbing pain on one or both sides of your head  · Nausea or vomiting  · Extreme sensitivity to light, sound, and smells  · Bright spots, flashes, or other visual changes  · Pain or nausea so severe that you can't continue your daily activities  Call your healthcare provider   If you have any of the following symptoms, contact your healthcare provider:  · A headache that lingers after a recent injury or bump to the head.  · A fever with a stiff neck or pain when you bend your head toward your chest.  · A headache along with slurred speech, changes in your vision, or numbness or weakness in your arms or legs.  · A headache for longer than 3 days.  · Frequent headaches, especially in the morning.  · Headaches with seizures   · Seek immediate medical attention if you have a headache that you would call "the worst headache you have ever had."   Date " Last Reviewed: 10/4/2015  © 8968-2610 [employer]. [address] [place] [address], [place] [address]. All rights reserved. This information is not intended as a substitute for professional medical care. Always follow your healthcare professional's instructions.          Push fluids to include Pedialyte/Gatorade and water.  Advance diet as tolerated.  Pt declined prescription for Sumatriptan and Naproxen and stated she will push fluids to help with headache relief.  Pt advised to return to clinic if diarrhea does not improve in the next 3 days as stool cultures may be warranted.  Avoid use of OTC Imodium or Pepto Bismol.

## 2020-08-15 NOTE — LETTER
August 15, 2020      [facility] [address] [place]OVAR[place] J[place] [facility] [address]  Phone: [phone]  Fax: [phone]       Patient: Ada Ramirez   YOB: 1990  Date of Visit: 08/15/2020    To Whom It May Concern:    Chidi Ramirez  was at [facility] on 08/15/2020. She may return to work on 8/19/2020 with no restrictions. If you have any questions or concerns, or if I can be of further assistance, please do not hesitate to contact me.    Sincerely,      Stepan Aquino PA-C

## 2020-08-15 NOTE — PATIENT INSTRUCTIONS
Noninfectious Gastroenteritis (Ages 6 Years to Adult)    Gastroenteritis can cause nausea, vomiting, diarrhea, and abdominal cramping. This may occur as a result of food sensitivity, inflammation of your gastrointestinal tract, medicines, stress, or other causes not related to infection. Your symptoms will usually last from 1 to 3 days, but can last longer. Antibiotics are not effective, but simple home treatment will be helpful.  Home care  Medicine  · You may use acetaminophen or NSAID medicines like ibuprofen or naproxen to control fever, unless another medicine is prescribed. (Note: If you have chronic liver or kidney disease, or ever had a stomach ulcer or gastrointestinalI bleeding, talk with your healthcare provider before using these medicines.) Aspirin should never be used in anyone under 18 years of age who is ill with a fever. It may cause severe liver damage. Don't increase your NSAID medicines if you are already taking these medicines for another condition (like arthritis). Don't use NSAIDS if you are on aspirin (such as for heart disease, or after a stroke).  · If medicines for diarrhea or vomiting are prescribed, take only as directed.  General care and preventing spread of the illness  · If symptoms are severe, rest at home for the next 24 hours or until you feel better.  · Hand washing with soap and water is the best way to prevent the spread of infection. Wash your hands after touching anyone who is sick.  · Wash your hands after using the toilet and before meals. Clean the toilet after each use.  · Caffeine, tobacco, and alcohol can make your diarrhea, cramping, and pain worse.  Diet  · Water and clear liquids are important so you do not get dehydrated. Drink a small amount at a time.  · Do not force yourself to eat, especially if you have cramps, vomiting, or diarrhea. When you finally decide to start eating, do not eat large amounts at a time, even if you are hungry.  · If you eat, avoid  fatty, greasy, spicy, or fried foods.  · Do not eat dairy products if you have diarrhea; they can make the diarrhea worse.  During the first 24 hours (the first full day), follow the diet below:  · Beverages: Water, clear liquids, soft drinks without caffeine, like ginger ale; mineral water (plain or flavored); decaffeinated tea and coffee.  · Soups: Clear broth, consommé, and bouillon Sports drinks aren't a good choice because they have too much sugar and not enough electrolytes. In this case, commercially available products called oral rehydration solutions are best.  · Desserts: Plain gelatin, popsicles, and fruit juice bars.  During the next 24 hours (the second day), you may add the following to the above if you have improved. If not, continue what you did the first day:  · Hot cereal, plain toast, bread, rolls, crackers  · Plain noodles, rice, mashed potatoes, chicken noodle or rice soup  · Unsweetened canned fruit (avoid pineapple), bananas  · Limit caffeine and chocolate. No spices or seasonings except salt.  During the next 24 hours  · Gradually resume a normal diet, as you feel better and your symptoms improve.  · If at any time your symptoms start getting worse, go back to clear liquids until you feel better.  Food preparation  · If you have diarrhea, you should not prepare food for others. When you  prepare food for yourself, wash your hands before and after.  · Wash your hands after using cutting boards, countertops, and knives that have been in contact with raw food.  · Keep uncooked meats away from cooked and ready-to-eat foods.  Follow-up care  Follow up with your healthcare provider if you are not improving over the next 2 to 3 days, or as advised. If a stool (diarrhea) sample was taken, call for the results as directed.  When to seek medical care  Call your healthcare provider right away if any of these occur:   · Increasing abdominal pain or constant lower right abdominal pain  · Continued  "vomiting (unable to keep liquids down)  · Frequent diarrhea (more than 5 times a day)  · Blood in vomit or stool (black or red color)  · Inability to tolerate solid food after a few days.  · Dark urine, reduced urine output  · Weakness, dizziness  · Drowsiness  · Fever of 100.4ºF (38.0ºC) or higher, or as directed by your healthcare provider  · New rash  Call 911  Call 911 if any of these occur:  · Trouble breathing  · Chest pain  · Confusion  · Severe drowsiness or trouble awakening  · Seizure  · Stiff neck  Date Last Reviewed: 11/16/2015  © 2809-5548 [employer] [address] [place] [address], [place] [address]. All rights reserved. This information is not intended as a substitute for professional medical care. Always follow your healthcare professional's instructions.        Headache, Unspecified    A number of things can cause headaches. The cause of your headache isnt clear. But it doesnt seem to be a sign of any serious illness.  You could have a tension headache or a migraine headache.  Stress can cause a tension headache. This can happen if you tense the muscles of your shoulders, neck, and scalp without knowing it. If this stress lasts long enough, you may develop a tension headache.  It is not clear why migraines occur, but certain things called" triggers" can raise the risk of having a migraine attack. Migraine triggers may include emotional stress or depression, or by hormone changes during the menstrual cycle. Other triggers include birth control pills and other medicines, alcohol or caffeine, foods with tyramine (such as aged cheese, wine), eyestrain, weather changes, missed meals, and lack of sleep or oversleeping.  Other causes of headache include:  · Viral illness with high fever  · Head injury with concussion  · Sinus, ear, or throat infection  · Dental pain and jaw joint (TMJ) pain  More serious but less common causes of headache include stroke, brain hemorrhage, brain tumor, meningitis, " and encephalitis.  Home care  Follow these tips when taking care of yourself at home:  · Dont drive yourself home if you were given pain medicine for your headache. Instead, have someone else drive you home. Try to sleep when you get home. You should feel much better when you wake up.  · Apply heat to the back of your neck to ease a neck muscle spasm. Take care of a migraine headache by putting an ice pack on your forehead or at the base of your skull.  · If you have nausea or vomiting, eat a light diet until your headache eases.  · If you have a migraine headache, use sunglasses when in the daylight or around bright indoor lighting until your symptoms get better. Bright glaring light can make this type of headache worse.  Follow-up care  Follow up with your healthcare provider, or as advised. Talk with your provider if you have frequent headaches. He or she can help figure out a treatment plan. By knowing the earliest signs of headache, and starting treatment right away, you may be able to stop the pain yourself.  When to seek medical advice  Call your healthcare provider right away if any of these occur:  · Your head pain suddenly gets worse after sexual intercourse or strenuous activity  · Your head pain doesnt get better within 24 hours  · You arent able to keep liquids down (repeated vomiting)  · Fever of 100.4ºF (38ºC) or higher, or as directed by your healthcare provider  · Stiff neck  · Extreme drowsiness, confusion, or fainting  · Dizziness or dizziness with spinning sensation (vertigo)  · Weakness in an arm or leg or one side of your face  · You have trouble talking or seeing  Date Last Reviewed: 8/1/2016 © 2000-2017 [employer]. [address] [place] [address], [place] [address]. All rights reserved. This information is not intended as a substitute for professional medical care. Always follow your healthcare professional's instructions.        Self-Care for Headaches  Most headaches aren't  "serious and can be relieved with self-care. But some headaches may be a sign of another health problem like eye trouble or high blood pressure. To find the best treatment, learn what kind of headaches you get. For tension headaches, self-care will usually help. To treat migraines, ask your healthcare provider for advice. It is also possible to get both tension and migraine headaches. Self-care involves relieving the pain and avoiding headache triggers if you can.    Ways to reduce pain and tension  Try these steps:  · Apply a cold compress or ice pack to the pain site.  · Drink fluids. If nausea makes it hard to drink, try sucking on ice.  · Rest. Protect yourself from bright light and loud noises.  · Calm your emotions by imagining a peaceful scene.  · Massage tight neck, shoulder, and head muscles.  · To relax muscles, soak in a hot bath or use a hot shower.  Use medicines  Aspirin or aspirin substitutes, such as ibuprofen and acetaminophen, can relieve headache. Remember: Never give aspirin to anyone 18 years old or younger because of the risk of developing Reye syndrome. Use pain medicines only when necessary.  Track your headaches  Keeping a headache diary can help you and your healthcare provider identify what's causing your headaches:  · Note when each headache happens.  · Identify your activities and the foods you've eaten 6 to 8 hours before the headache began.  · Look for any trends or "triggers."  Signs of tension headache  Any of the following can be signs:  · Dull pain or feeling of pressure in a tight band around your head  · Pain in your neck or shoulders  · Headache without a definite beginning or end  · Headache after an activity such as driving or working on a computer  Signs of migraine  Any of the following can be signs:  · Throbbing pain on one or both sides of your head  · Nausea or vomiting  · Extreme sensitivity to light, sound, and smells  · Bright spots, flashes, or other visual " "changes  · Pain or nausea so severe that you can't continue your daily activities  Call your healthcare provider   If you have any of the following symptoms, contact your healthcare provider:  · A headache that lingers after a recent injury or bump to the head.  · A fever with a stiff neck or pain when you bend your head toward your chest.  · A headache along with slurred speech, changes in your vision, or numbness or weakness in your arms or legs.  · A headache for longer than 3 days.  · Frequent headaches, especially in the morning.  · Headaches with seizures   · Seek immediate medical attention if you have a headache that you would call "the worst headache you have ever had."   Date Last Reviewed: 10/4/2015  © 9266-5629 [employer] [address] [place] [address], [place] [address]. All rights reserved. This information is not intended as a substitute for professional medical care. Always follow your healthcare professional's instructions.        "

## 2020-08-18 ENCOUNTER — PATIENT MESSAGE (OUTPATIENT)
Dept: INTERNAL MEDICINE | Facility: CLINIC | Age: 30
End: 2020-08-18

## 2020-09-17 ENCOUNTER — PATIENT OUTREACH (OUTPATIENT)
Dept: ADMINISTRATIVE | Facility: OTHER | Age: 30
End: 2020-09-17

## 2020-09-17 NOTE — PROGRESS NOTES
Health Maintenance Due   Topic Date Due    Pneumococcal Vaccine (Medium Risk) (1 of 1 - PPSV23) 09/04/2010    Influenza Vaccine (1) 08/01/2020     Updates were requested from care everywhere.  Chart was reviewed for overdue Proactive [facility] Encounters ([facility]) topics (CRS, Breast Cancer Screening, Eye exam)  Health Maintenance has been updated.  LINKS immunization registry triggered.  Immunizations were reconciled.

## 2020-09-28 NOTE — PROGRESS NOTES
Subjective:      Patient ID: Ada Ramirez is a 30 y.o. female.    Chief Complaint: Follow-up    Ms Ramirez is a 29 yo female here for follow up of tailbone pain and right thigh numbness.  She was last seen by me on 6/18/20  and she has had pain since May 2019 she went on road trip and has had tailbone pain since then.  She was sent for a nucleus of impar injection done 7/9/2020.   Today, she is doing well.  She feels like the injection helped so much.  She feels like pain is gone.  She will have some soreness if sit too long.  She feels like she has some numbness in the right leg and anal itching.  The numbness is the side of the right leg.  It does not go past the knee.  Everything is on the right side.  She feels like numbness is not constant.  She does not feel like it ever feels exactly like the left leg but sometimes sensation changes.  It is not positional.  She still also has the anal itching.  No pain    X-ray sacrum 3/11/2020  No fracture dislocation bone destruction or other abnormality seen.    Past Medical History:  No date: Allergy    Past Surgical History:  No date: ADENOIDECTOMY  No date: TONSILLECTOMY    Review of patient's family history indicates:  Problem: Fibromyalgia      Relation: Mother          Age of Onset: (Not Specified)  Problem: Hypertension      Relation: Father          Age of Onset: (Not Specified)      Social History    Socioeconomic History      Marital status: Single      Spouse name: Not on file      Number of children: Not on file      Years of education: Not on file      Highest education level: Not on file    Occupational History      Occupation:     Social Needs      Financial resource strain: Somewhat hard      Food insecurity        Worry: Sometimes true        Inability: Never true      Transportation needs        Medical: No        Non-medical: No    Tobacco Use      Smoking status: Light Tobacco Smoker        Types: Cigarettes      Smokeless tobacco: Never  Used    Substance and Sexual Activity      Alcohol use: Yes        Frequency: 2-4 times a month        Drinks per session: 1 or 2        Binge frequency: Never        Comment: once week       Drug use: Never      Sexual activity: Not Currently        Partners: Female    Lifestyle      Physical activity        Days per week: 3 days        Minutes per session: 40 min      Stress: Only a little    Relationships      Social connections        Talks on phone: Once a week        Gets together: Once a week        Attends Yazidi service: Not on file        Active member of club or organization: No        Attends meetings of clubs or organizations: Patient refused        Relationship status: Patient refused    Other Topics      Concerns:        Not on file    Social History Narrative      Not on file      Current Outpatient Medications:  boric acid (BORIC ACID) vaginal suppository, insert 1 suppository vaginally 1-2 times weekly, Disp: , Rfl:   busPIRone (BUSPAR) 5 MG Tab, Take 1 tablet (5 mg total) by mouth 2 (two) times daily., Disp: 60 tablet, Rfl: 5  fexofenadine (ALLEGRA) 180 MG tablet, Take 180 mg by mouth once daily., Disp: , Rfl:   FLUoxetine 10 MG capsule, Take 1 capsule (10 mg total) by mouth once daily. (Patient not taking: Reported on 5/26/2020), Disp: 30 capsule, Rfl: 2  hydrocortisone 2.5 % cream, Apply topically 2 (two) times daily. for 10 days, Disp: 28 g, Rfl: 0  meloxicam (MOBIC) 7.5 MG tablet, Take 1 tablet (7.5 mg total) by mouth once daily. (Patient not taking: Reported on 5/26/2020), Disp: 30 tablet, Rfl: 1  valACYclovir (VALTREX) 500 MG tablet, TK 1 T PO D, Disp: , Rfl: 3    No current facility-administered medications for this visit.       Review of patient's allergies indicates:   -- Cephalosporins -- Rash   -- Penicillins -- Rash   -- Sulfa (sulfonamide antibiotics) -- Rash   -- Corn containing products -- Diarrhea and Nausea And Vomiting   -- Eggs (egg derived) -- Nausea Only   -- Latex, natural  rubber -- Dermatitis and Rash        Review of Systems   Constitution: Negative for weight gain and weight loss.   Cardiovascular: Negative for chest pain.   Respiratory: Negative for shortness of breath.    Musculoskeletal: Negative for back pain, joint pain and joint swelling.   Gastrointestinal: Negative for abdominal pain, bowel incontinence, nausea and vomiting.   Genitourinary: Negative for bladder incontinence.   Neurological: Positive for numbness (right lateral thigh at times). Negative for paresthesias.         Objective:        General: Ada is well-developed, well-nourished, appears stated age, in no acute distress, alert and oriented to time, place and person.     General    Vitals reviewed.  Constitutional: She is oriented to person, place, and time. She appears well-developed and well-nourished.   HENT:   Head: Normocephalic and atraumatic.   Pulmonary/Chest: Effort normal.   Neurological: She is alert and oriented to person, place, and time.   Psychiatric: She has a normal mood and affect. Her behavior is normal. Judgment and thought content normal.     General Musculoskeletal Exam   Gait: normal     Right Ankle/Foot Exam     Tests   Heel Walk: able to perform  Tiptoe Walk: able to perform    Left Ankle/Foot Exam     Tests   Heel Walk: able to perform  Tiptoe Walk: able to perform  Back (L-Spine & T-Spine) / Neck (C-Spine) Exam     Back (L-Spine & T-Spine) Range of Motion   Extension: 30   Flexion: 90   Lateral bend right: 20   Lateral bend left: 20   Rotation right: 40   Rotation left: 40     Spinal Sensation   Right Side Sensation  C-Spine Level: normal   L-Spine Level: normal and decreased  S-Spine Level: normal  Left Side Sensation  C-Spine Level: normal  L-Spine Level: normal  S-Spine Level: normal    Back (L-Spine & T-Spine) Tests   Right Side Tests  Straight leg raise:      Sitting SLR: > 70 degrees      Left Side Tests  Straight leg raise:     Sitting SLR: > 70 degrees          Other She has  no scoliosis .  Spinal Kyphosis:  Absent    Comments:  Neg RANJIT bilaterally  Decreased sensation proximal lateral thigh small area on right      Muscle Strength   Right Upper Extremity   Biceps: 5/5   Deltoid:  5/5  Triceps:  5/5  Wrist extension: 5/5   Finger Flexors:  5/5  Left Upper Extremity  Biceps: 5/5   Deltoid:  5/5  Triceps:  5/5  Wrist extension: 5/5   Finger Flexors:  5/5  Right Lower Extremity   Hip Flexion: 5/5   Quadriceps:  5/5   Anterior tibial:  5/5   EHL:  5/5  Left Lower Extremity   Hip Flexion: 5/5   Quadriceps:  5/5   Anterior tibial:  5/5   EHL:  5/5    Reflexes     Left Side  Biceps:  2+  Triceps:  2+  Brachioradialis:  2+  Achilles:  2+  Left Nuñez's Sign:  Absent  Babinski Sign:  absent  Quadriceps:  2+    Right Side   Biceps:  2+  Triceps:  2+  Brachioradialis:  2+  Achilles:  2+  Right Nuñez's Sign:  absent  Babinski Sign:  absent  Quadriceps:  2+    Vascular Exam     Right Pulses        Carotid:                  2+    Left Pulses        Carotid:                  2+              Assessment:       1. Coccydynia    2. Neuralgia of right lower extremity           Plan:       Orders Placed This Encounter    amitriptyline (ELAVIL) 10 MG tablet     1. buttock pain is much better after nucleus of impar injection 7/9/20 she feels like quality of life has improved  2. We discussed donut pillow, but she just feels more pressure on butt cheeks   3. We did discuss that sometimes  elavil can help rectal pain, and more itching  Elavil 10-20mg po QHS  4. We discussed MRI lumbar spine but will wait for now  5. RTC 4 months          Follow-up: Follow up in about 4 months (around 1/29/2021). If there are any questions prior to this, the patient was instructed to contact the office.

## 2020-09-29 ENCOUNTER — OFFICE VISIT (OUTPATIENT)
Dept: SPINE | Facility: CLINIC | Age: 30
End: 2020-09-29
Attending: PHYSICAL MEDICINE & REHABILITATION
Payer: COMMERCIAL

## 2020-09-29 VITALS
SYSTOLIC BLOOD PRESSURE: 112 MMHG | HEART RATE: 85 BPM | HEIGHT: 64 IN | WEIGHT: 147.06 LBS | DIASTOLIC BLOOD PRESSURE: 63 MMHG | BODY MASS INDEX: 25.11 KG/M2

## 2020-09-29 DIAGNOSIS — M79.2 NEURALGIA OF RIGHT LOWER EXTREMITY: ICD-10-CM

## 2020-09-29 DIAGNOSIS — M53.3 COCCYDYNIA: Primary | ICD-10-CM

## 2020-09-29 PROCEDURE — 3008F PR BODY MASS INDEX (BMI) DOCUMENTED: ICD-10-PCS | Mod: CPTII,S$GLB,, | Performed by: PHYSICAL MEDICINE & REHABILITATION

## 2020-09-29 PROCEDURE — 99999 PR PBB SHADOW E&M-EST. PATIENT-LVL III: ICD-10-PCS | Mod: PBBFAC,,, | Performed by: PHYSICAL MEDICINE & REHABILITATION

## 2020-09-29 PROCEDURE — 3008F BODY MASS INDEX DOCD: CPT | Mod: CPTII,S$GLB,, | Performed by: PHYSICAL MEDICINE & REHABILITATION

## 2020-09-29 PROCEDURE — 99999 PR PBB SHADOW E&M-EST. PATIENT-LVL III: CPT | Mod: PBBFAC,,, | Performed by: PHYSICAL MEDICINE & REHABILITATION

## 2020-09-29 PROCEDURE — 99214 OFFICE O/P EST MOD 30 MIN: CPT | Mod: S$GLB,,, | Performed by: PHYSICAL MEDICINE & REHABILITATION

## 2020-09-29 PROCEDURE — 99214 PR OFFICE/OUTPT VISIT, EST, LEVL IV, 30-39 MIN: ICD-10-PCS | Mod: S$GLB,,, | Performed by: PHYSICAL MEDICINE & REHABILITATION

## 2020-09-29 RX ORDER — AMITRIPTYLINE HYDROCHLORIDE 10 MG/1
10-20 TABLET, FILM COATED ORAL NIGHTLY PRN
Qty: 60 TABLET | Refills: 2 | Status: SHIPPED | OUTPATIENT
Start: 2020-09-29 | End: 2021-01-07 | Stop reason: SDUPTHER

## 2021-01-07 RX ORDER — AMITRIPTYLINE HYDROCHLORIDE 10 MG/1
10-20 TABLET, FILM COATED ORAL NIGHTLY PRN
Qty: 60 TABLET | Refills: 2 | Status: SHIPPED | OUTPATIENT
Start: 2021-01-07 | End: 2021-09-30

## 2021-02-11 ENCOUNTER — PATIENT OUTREACH (OUTPATIENT)
Dept: ADMINISTRATIVE | Facility: OTHER | Age: 31
End: 2021-02-11

## 2021-02-15 ENCOUNTER — OFFICE VISIT (OUTPATIENT)
Dept: SPINE | Facility: CLINIC | Age: 31
End: 2021-02-15
Attending: PHYSICAL MEDICINE & REHABILITATION
Payer: COMMERCIAL

## 2021-02-15 DIAGNOSIS — M53.3 COCCYDYNIA: Primary | ICD-10-CM

## 2021-02-15 PROCEDURE — 99214 PR OFFICE/OUTPT VISIT, EST, LEVL IV, 30-39 MIN: ICD-10-PCS | Mod: 95,,, | Performed by: PHYSICAL MEDICINE & REHABILITATION

## 2021-02-15 PROCEDURE — 99214 OFFICE O/P EST MOD 30 MIN: CPT | Mod: 95,,, | Performed by: PHYSICAL MEDICINE & REHABILITATION

## 2021-02-17 ENCOUNTER — TELEPHONE (OUTPATIENT)
Dept: PAIN MEDICINE | Facility: OTHER | Age: 31
End: 2021-02-17

## 2021-02-18 ENCOUNTER — TELEPHONE (OUTPATIENT)
Dept: PAIN MEDICINE | Facility: OTHER | Age: 31
End: 2021-02-18

## 2021-02-18 ENCOUNTER — PATIENT MESSAGE (OUTPATIENT)
Dept: PAIN MEDICINE | Facility: OTHER | Age: 31
End: 2021-02-18

## 2021-02-18 DIAGNOSIS — M53.3 COCCYDYNIA: Primary | ICD-10-CM

## 2021-03-10 ENCOUNTER — OFFICE VISIT (OUTPATIENT)
Dept: INTERNAL MEDICINE | Facility: CLINIC | Age: 31
End: 2021-03-10
Payer: COMMERCIAL

## 2021-03-10 VITALS
RESPIRATION RATE: 15 BRPM | TEMPERATURE: 98 F | HEIGHT: 64 IN | DIASTOLIC BLOOD PRESSURE: 80 MMHG | OXYGEN SATURATION: 97 % | SYSTOLIC BLOOD PRESSURE: 120 MMHG | HEART RATE: 105 BPM | WEIGHT: 155.19 LBS | BODY MASS INDEX: 26.49 KG/M2

## 2021-03-10 DIAGNOSIS — F41.1 GAD (GENERALIZED ANXIETY DISORDER): ICD-10-CM

## 2021-03-10 DIAGNOSIS — M53.3 COCCYX PAIN: ICD-10-CM

## 2021-03-10 DIAGNOSIS — Z00.00 ANNUAL PHYSICAL EXAM: Primary | ICD-10-CM

## 2021-03-10 DIAGNOSIS — F17.210 CIGARETTE NICOTINE DEPENDENCE WITHOUT COMPLICATION: ICD-10-CM

## 2021-03-10 PROCEDURE — 99395 PR PREVENTIVE VISIT,EST,18-39: ICD-10-PCS | Mod: S$GLB,,, | Performed by: HOSPITALIST

## 2021-03-10 PROCEDURE — 99999 PR PBB SHADOW E&M-EST. PATIENT-LVL V: CPT | Mod: PBBFAC,,, | Performed by: HOSPITALIST

## 2021-03-10 PROCEDURE — 99999 PR PBB SHADOW E&M-EST. PATIENT-LVL V: ICD-10-PCS | Mod: PBBFAC,,, | Performed by: HOSPITALIST

## 2021-03-10 PROCEDURE — 99395 PREV VISIT EST AGE 18-39: CPT | Mod: S$GLB,,, | Performed by: HOSPITALIST

## 2021-03-15 ENCOUNTER — LAB VISIT (OUTPATIENT)
Dept: LAB | Facility: HOSPITAL | Age: 31
End: 2021-03-15
Attending: HOSPITALIST
Payer: COMMERCIAL

## 2021-03-15 DIAGNOSIS — Z00.00 ANNUAL PHYSICAL EXAM: ICD-10-CM

## 2021-03-15 LAB
ALBUMIN SERPL BCP-MCNC: 4.1 G/DL (ref 3.5–5.2)
ALP SERPL-CCNC: 72 U/L (ref 55–135)
ALT SERPL W/O P-5'-P-CCNC: 11 U/L (ref 10–44)
ANION GAP SERPL CALC-SCNC: 8 MMOL/L (ref 8–16)
AST SERPL-CCNC: 13 U/L (ref 10–40)
BASOPHILS # BLD AUTO: 0.04 K/UL (ref 0–0.2)
BASOPHILS NFR BLD: 0.9 % (ref 0–1.9)
BILIRUB SERPL-MCNC: 0.3 MG/DL (ref 0.1–1)
BUN SERPL-MCNC: 11 MG/DL (ref 6–20)
CALCIUM SERPL-MCNC: 8.3 MG/DL (ref 8.7–10.5)
CHLORIDE SERPL-SCNC: 112 MMOL/L (ref 95–110)
CHOLEST SERPL-MCNC: 140 MG/DL (ref 120–199)
CHOLEST/HDLC SERPL: 2.3 {RATIO} (ref 2–5)
CO2 SERPL-SCNC: 20 MMOL/L (ref 23–29)
CREAT SERPL-MCNC: 0.7 MG/DL (ref 0.5–1.4)
DIFFERENTIAL METHOD: ABNORMAL
EOSINOPHIL # BLD AUTO: 0.2 K/UL (ref 0–0.5)
EOSINOPHIL NFR BLD: 3.5 % (ref 0–8)
ERYTHROCYTE [DISTWIDTH] IN BLOOD BY AUTOMATED COUNT: 12.4 % (ref 11.5–14.5)
EST. GFR  (AFRICAN AMERICAN): >60 ML/MIN/1.73 M^2
EST. GFR  (NON AFRICAN AMERICAN): >60 ML/MIN/1.73 M^2
GLUCOSE SERPL-MCNC: 94 MG/DL (ref 70–110)
HCT VFR BLD AUTO: 38.9 % (ref 37–48.5)
HDLC SERPL-MCNC: 62 MG/DL (ref 40–75)
HDLC SERPL: 44.3 % (ref 20–50)
HGB BLD-MCNC: 12.6 G/DL (ref 12–16)
IMM GRANULOCYTES # BLD AUTO: 0.01 K/UL (ref 0–0.04)
IMM GRANULOCYTES NFR BLD AUTO: 0.2 % (ref 0–0.5)
LDLC SERPL CALC-MCNC: 71 MG/DL (ref 63–159)
LYMPHOCYTES # BLD AUTO: 1.2 K/UL (ref 1–4.8)
LYMPHOCYTES NFR BLD: 25.6 % (ref 18–48)
MCH RBC QN AUTO: 31.4 PG (ref 27–31)
MCHC RBC AUTO-ENTMCNC: 32.4 G/DL (ref 32–36)
MCV RBC AUTO: 97 FL (ref 82–98)
MONOCYTES # BLD AUTO: 0.6 K/UL (ref 0.3–1)
MONOCYTES NFR BLD: 12.4 % (ref 4–15)
NEUTROPHILS # BLD AUTO: 2.7 K/UL (ref 1.8–7.7)
NEUTROPHILS NFR BLD: 57.4 % (ref 38–73)
NONHDLC SERPL-MCNC: 78 MG/DL
NRBC BLD-RTO: 0 /100 WBC
PLATELET # BLD AUTO: 295 K/UL (ref 150–350)
PMV BLD AUTO: 11.3 FL (ref 9.2–12.9)
POTASSIUM SERPL-SCNC: 4 MMOL/L (ref 3.5–5.1)
PROT SERPL-MCNC: 7 G/DL (ref 6–8.4)
RBC # BLD AUTO: 4.01 M/UL (ref 4–5.4)
SODIUM SERPL-SCNC: 140 MMOL/L (ref 136–145)
TRIGL SERPL-MCNC: 35 MG/DL (ref 30–150)
TSH SERPL DL<=0.005 MIU/L-ACNC: 1.32 UIU/ML (ref 0.4–4)
WBC # BLD AUTO: 4.61 K/UL (ref 3.9–12.7)

## 2021-03-15 PROCEDURE — 80074 ACUTE HEPATITIS PANEL: CPT | Performed by: HOSPITALIST

## 2021-03-15 PROCEDURE — 80061 LIPID PANEL: CPT | Performed by: HOSPITALIST

## 2021-03-15 PROCEDURE — 86703 HIV-1/HIV-2 1 RESULT ANTBDY: CPT | Performed by: HOSPITALIST

## 2021-03-15 PROCEDURE — 36415 COLL VENOUS BLD VENIPUNCTURE: CPT | Mod: PO | Performed by: HOSPITALIST

## 2021-03-15 PROCEDURE — 86592 SYPHILIS TEST NON-TREP QUAL: CPT | Performed by: HOSPITALIST

## 2021-03-15 PROCEDURE — 84443 ASSAY THYROID STIM HORMONE: CPT | Performed by: HOSPITALIST

## 2021-03-15 PROCEDURE — 80053 COMPREHEN METABOLIC PANEL: CPT | Performed by: HOSPITALIST

## 2021-03-15 PROCEDURE — 85025 COMPLETE CBC W/AUTO DIFF WBC: CPT | Performed by: HOSPITALIST

## 2021-03-16 ENCOUNTER — PATIENT MESSAGE (OUTPATIENT)
Dept: INTERNAL MEDICINE | Facility: CLINIC | Age: 31
End: 2021-03-16

## 2021-03-16 LAB
HAV IGM SERPL QL IA: NEGATIVE
HBV CORE IGM SERPL QL IA: NEGATIVE
HBV SURFACE AG SERPL QL IA: NEGATIVE
HCV AB SERPL QL IA: NEGATIVE
HIV 1+2 AB+HIV1 P24 AG SERPL QL IA: NEGATIVE
RPR SER QL: NORMAL

## 2021-03-22 ENCOUNTER — HOSPITAL ENCOUNTER (OUTPATIENT)
Facility: OTHER | Age: 31
Discharge: HOME | End: 2021-03-22
Attending: ANESTHESIOLOGY | Admitting: ANESTHESIOLOGY
Payer: COMMERCIAL

## 2021-03-22 VITALS
OXYGEN SATURATION: 100 % | HEIGHT: 64 IN | TEMPERATURE: 98 F | HEART RATE: 70 BPM | DIASTOLIC BLOOD PRESSURE: 74 MMHG | SYSTOLIC BLOOD PRESSURE: 121 MMHG | BODY MASS INDEX: 25.61 KG/M2 | RESPIRATION RATE: 14 BRPM | WEIGHT: 150 LBS

## 2021-03-22 DIAGNOSIS — G89.4 CHRONIC PAIN SYNDROME: Primary | ICD-10-CM

## 2021-03-22 DIAGNOSIS — G89.29 CHRONIC PAIN: ICD-10-CM

## 2021-03-22 PROCEDURE — 64999 PR GANGLION IMPAR INJECTION: ICD-10-PCS | Mod: ,,, | Performed by: ANESTHESIOLOGY

## 2021-03-22 PROCEDURE — 25500020 PHARM REV CODE 255: Performed by: ANESTHESIOLOGY

## 2021-03-22 PROCEDURE — 20550 PR INJECT TENDON SHEATH/LIGAMENT: ICD-10-PCS | Mod: 59,,, | Performed by: ANESTHESIOLOGY

## 2021-03-22 PROCEDURE — 25000003 PHARM REV CODE 250: Performed by: ANESTHESIOLOGY

## 2021-03-22 PROCEDURE — 63600175 PHARM REV CODE 636 W HCPCS: Performed by: ANESTHESIOLOGY

## 2021-03-22 PROCEDURE — 64999 UNLISTED PX NERVOUS SYSTEM: CPT | Performed by: ANESTHESIOLOGY

## 2021-03-22 PROCEDURE — 64999 UNLISTED PX NERVOUS SYSTEM: CPT | Mod: ,,, | Performed by: ANESTHESIOLOGY

## 2021-03-22 PROCEDURE — 20550 NJX 1 TENDON SHEATH/LIGAMENT: CPT | Mod: 59 | Performed by: ANESTHESIOLOGY

## 2021-03-22 PROCEDURE — 20550 NJX 1 TENDON SHEATH/LIGAMENT: CPT | Mod: 59,,, | Performed by: ANESTHESIOLOGY

## 2021-03-22 RX ORDER — SODIUM CHLORIDE 9 MG/ML
INJECTION, SOLUTION INTRAVENOUS CONTINUOUS
Status: DISCONTINUED | OUTPATIENT
Start: 2021-03-22 | End: 2021-03-22 | Stop reason: HOSPADM

## 2021-03-22 RX ORDER — TRIAMCINOLONE ACETONIDE 40 MG/ML
INJECTION, SUSPENSION INTRA-ARTICULAR; INTRAMUSCULAR
Status: DISCONTINUED | OUTPATIENT
Start: 2021-03-22 | End: 2021-03-22 | Stop reason: HOSPADM

## 2021-03-22 RX ORDER — BUPIVACAINE HYDROCHLORIDE 2.5 MG/ML
INJECTION, SOLUTION EPIDURAL; INFILTRATION; INTRACAUDAL
Status: DISCONTINUED | OUTPATIENT
Start: 2021-03-22 | End: 2021-03-22 | Stop reason: HOSPADM

## 2021-03-22 RX ORDER — ALPRAZOLAM 0.5 MG/1
1 TABLET ORAL ONCE
Status: COMPLETED | OUTPATIENT
Start: 2021-03-22 | End: 2021-03-22

## 2021-03-22 RX ORDER — LIDOCAINE HYDROCHLORIDE 10 MG/ML
INJECTION INFILTRATION; PERINEURAL
Status: DISCONTINUED | OUTPATIENT
Start: 2021-03-22 | End: 2021-03-22 | Stop reason: HOSPADM

## 2021-03-22 RX ADMIN — ALPRAZOLAM 1 MG: 0.5 TABLET ORAL at 01:03

## 2021-03-31 ENCOUNTER — CLINICAL SUPPORT (OUTPATIENT)
Dept: SMOKING CESSATION | Facility: CLINIC | Age: 31
End: 2021-03-31

## 2021-03-31 DIAGNOSIS — F17.200 NICOTINE DEPENDENCE: Primary | ICD-10-CM

## 2021-03-31 PROCEDURE — 99999 PR PBB SHADOW E&M-EST. PATIENT-LVL I: CPT | Mod: PBBFAC,,,

## 2021-03-31 PROCEDURE — 99999 PR PBB SHADOW E&M-EST. PATIENT-LVL I: ICD-10-PCS | Mod: PBBFAC,,,

## 2021-03-31 RX ORDER — VARENICLINE TARTRATE 0.5 (11)-1
KIT ORAL
Qty: 53 TABLET | Refills: 0 | Status: SHIPPED | OUTPATIENT
Start: 2021-03-31 | End: 2021-09-30

## 2021-03-31 RX ORDER — DIPHENHYDRAMINE HCL 25 MG
4 CAPSULE ORAL
Qty: 200 EACH | Refills: 0 | Status: SHIPPED | OUTPATIENT
Start: 2021-03-31 | End: 2021-09-30

## 2021-04-14 ENCOUNTER — PATIENT MESSAGE (OUTPATIENT)
Dept: SMOKING CESSATION | Facility: CLINIC | Age: 31
End: 2021-04-14

## 2021-05-04 ENCOUNTER — CLINICAL SUPPORT (OUTPATIENT)
Dept: SMOKING CESSATION | Facility: CLINIC | Age: 31
End: 2021-05-04
Payer: MEDICAID

## 2021-05-04 DIAGNOSIS — F17.200 NICOTINE DEPENDENCE: Primary | ICD-10-CM

## 2021-05-04 PROCEDURE — 99403 PR PREVENT COUNSEL,INDIV,45 MIN: ICD-10-PCS | Mod: S$GLB,,,

## 2021-05-04 PROCEDURE — 99999 PR PBB SHADOW E&M-EST. PATIENT-LVL I: CPT | Mod: PBBFAC,,,

## 2021-05-04 PROCEDURE — 99403 PREV MED CNSL INDIV APPRX 45: CPT | Mod: S$GLB,,,

## 2021-05-04 PROCEDURE — 99999 PR PBB SHADOW E&M-EST. PATIENT-LVL I: ICD-10-PCS | Mod: PBBFAC,,,

## 2021-05-25 ENCOUNTER — CLINICAL SUPPORT (OUTPATIENT)
Dept: SMOKING CESSATION | Facility: CLINIC | Age: 31
End: 2021-05-25

## 2021-05-25 DIAGNOSIS — F17.200 NICOTINE DEPENDENCE: Primary | ICD-10-CM

## 2021-05-25 PROCEDURE — 99403 PR PREVENT COUNSEL,INDIV,45 MIN: ICD-10-PCS | Mod: S$GLB,,,

## 2021-05-25 PROCEDURE — 99403 PREV MED CNSL INDIV APPRX 45: CPT | Mod: S$GLB,,,

## 2021-05-25 PROCEDURE — 99999 PR PBB SHADOW E&M-EST. PATIENT-LVL I: ICD-10-PCS | Mod: PBBFAC,,,

## 2021-05-25 PROCEDURE — 99999 PR PBB SHADOW E&M-EST. PATIENT-LVL I: CPT | Mod: PBBFAC,,,

## 2021-06-08 ENCOUNTER — CLINICAL SUPPORT (OUTPATIENT)
Dept: SMOKING CESSATION | Facility: CLINIC | Age: 31
End: 2021-06-08
Attending: SURGERY

## 2021-06-08 DIAGNOSIS — F17.200 NICOTINE DEPENDENCE: Primary | ICD-10-CM

## 2021-06-08 PROCEDURE — 99999 PR PBB SHADOW E&M-EST. PATIENT-LVL I: ICD-10-PCS | Mod: PBBFAC,,,

## 2021-06-08 PROCEDURE — 99404 PREV MED CNSL INDIV APPRX 60: CPT | Mod: S$GLB,,,

## 2021-06-08 PROCEDURE — 99404 PR PREVENT COUNSEL,INDIV,60 MIN: ICD-10-PCS | Mod: S$GLB,,,

## 2021-06-08 PROCEDURE — 99999 PR PBB SHADOW E&M-EST. PATIENT-LVL I: CPT | Mod: PBBFAC,,,

## 2021-06-16 ENCOUNTER — PATIENT MESSAGE (OUTPATIENT)
Dept: INTERNAL MEDICINE | Facility: CLINIC | Age: 31
End: 2021-06-16

## 2021-06-16 ENCOUNTER — OFFICE VISIT (OUTPATIENT)
Dept: SURGERY | Facility: OTHER | Age: 31
End: 2021-06-16
Attending: COLON & RECTAL SURGERY
Payer: MEDICAID

## 2021-06-16 VITALS
SYSTOLIC BLOOD PRESSURE: 112 MMHG | BODY MASS INDEX: 25.44 KG/M2 | WEIGHT: 149 LBS | HEART RATE: 74 BPM | DIASTOLIC BLOOD PRESSURE: 62 MMHG | HEIGHT: 64 IN

## 2021-06-16 DIAGNOSIS — L29.0 ANAL ITCHING: Primary | ICD-10-CM

## 2021-06-16 DIAGNOSIS — M53.3 COCCYDYNIA: Primary | ICD-10-CM

## 2021-06-16 PROCEDURE — 99214 OFFICE O/P EST MOD 30 MIN: CPT | Mod: PBBFAC | Performed by: COLON & RECTAL SURGERY

## 2021-06-16 PROCEDURE — 99213 OFFICE O/P EST LOW 20 MIN: CPT | Mod: S$PBB,,, | Performed by: COLON & RECTAL SURGERY

## 2021-06-16 PROCEDURE — 99999 PR PBB SHADOW E&M-EST. PATIENT-LVL IV: CPT | Mod: PBBFAC,,, | Performed by: COLON & RECTAL SURGERY

## 2021-06-16 PROCEDURE — 99213 PR OFFICE/OUTPT VISIT, EST, LEVL III, 20-29 MIN: ICD-10-PCS | Mod: S$PBB,,, | Performed by: COLON & RECTAL SURGERY

## 2021-06-16 PROCEDURE — 99999 PR PBB SHADOW E&M-EST. PATIENT-LVL IV: ICD-10-PCS | Mod: PBBFAC,,, | Performed by: COLON & RECTAL SURGERY

## 2021-06-16 RX ORDER — GABAPENTIN 100 MG/1
100 CAPSULE ORAL 3 TIMES DAILY
Qty: 90 CAPSULE | Refills: 11 | Status: SHIPPED | OUTPATIENT
Start: 2021-06-16 | End: 2021-09-29

## 2021-06-22 ENCOUNTER — CLINICAL SUPPORT (OUTPATIENT)
Dept: SMOKING CESSATION | Facility: CLINIC | Age: 31
End: 2021-06-22

## 2021-06-22 DIAGNOSIS — F17.200 NICOTINE DEPENDENCE: Primary | ICD-10-CM

## 2021-06-22 PROCEDURE — 99404 PR PREVENT COUNSEL,INDIV,60 MIN: ICD-10-PCS | Mod: S$GLB,,,

## 2021-06-22 PROCEDURE — 99999 PR PBB SHADOW E&M-EST. PATIENT-LVL II: ICD-10-PCS | Mod: PBBFAC,,,

## 2021-06-22 PROCEDURE — 99404 PREV MED CNSL INDIV APPRX 60: CPT | Mod: S$GLB,,,

## 2021-06-22 PROCEDURE — 99999 PR PBB SHADOW E&M-EST. PATIENT-LVL II: CPT | Mod: PBBFAC,,,

## 2021-07-06 ENCOUNTER — PATIENT MESSAGE (OUTPATIENT)
Dept: INTERNAL MEDICINE | Facility: CLINIC | Age: 31
End: 2021-07-06

## 2021-07-06 DIAGNOSIS — Z13.39 ADHD (ATTENTION DEFICIT HYPERACTIVITY DISORDER) EVALUATION: Primary | ICD-10-CM

## 2021-07-12 ENCOUNTER — CLINICAL SUPPORT (OUTPATIENT)
Dept: SMOKING CESSATION | Facility: CLINIC | Age: 31
End: 2021-07-12

## 2021-07-12 DIAGNOSIS — F17.200 NICOTINE DEPENDENCE: Primary | ICD-10-CM

## 2021-07-12 PROCEDURE — 99404 PREV MED CNSL INDIV APPRX 60: CPT | Mod: S$GLB,,,

## 2021-07-12 PROCEDURE — 99999 PR PBB SHADOW E&M-EST. PATIENT-LVL I: CPT | Mod: PBBFAC,,,

## 2021-07-12 PROCEDURE — 99999 PR PBB SHADOW E&M-EST. PATIENT-LVL I: ICD-10-PCS | Mod: PBBFAC,,,

## 2021-07-12 PROCEDURE — 99404 PR PREVENT COUNSEL,INDIV,60 MIN: ICD-10-PCS | Mod: S$GLB,,,

## 2021-07-23 ENCOUNTER — CLINICAL SUPPORT (OUTPATIENT)
Dept: REHABILITATION | Facility: HOSPITAL | Age: 31
End: 2021-07-23
Attending: HOSPITALIST
Payer: MEDICAID

## 2021-07-23 DIAGNOSIS — M53.3 COCCYX PAIN: ICD-10-CM

## 2021-07-23 DIAGNOSIS — M53.3 COCCYDYNIA: ICD-10-CM

## 2021-07-23 DIAGNOSIS — M25.559 HIP PAIN: ICD-10-CM

## 2021-07-23 PROCEDURE — 97161 PT EVAL LOW COMPLEX 20 MIN: CPT | Mod: PO

## 2021-07-23 PROCEDURE — 97110 THERAPEUTIC EXERCISES: CPT | Mod: PO

## 2021-07-26 ENCOUNTER — CLINICAL SUPPORT (OUTPATIENT)
Dept: SMOKING CESSATION | Facility: CLINIC | Age: 31
End: 2021-07-26

## 2021-07-26 DIAGNOSIS — F17.200 NICOTINE DEPENDENCE: Primary | ICD-10-CM

## 2021-07-26 PROCEDURE — 99404 PREV MED CNSL INDIV APPRX 60: CPT | Mod: S$GLB,,,

## 2021-07-26 PROCEDURE — 99404 PR PREVENT COUNSEL,INDIV,60 MIN: ICD-10-PCS | Mod: S$GLB,,,

## 2021-07-26 PROCEDURE — 99999 PR PBB SHADOW E&M-EST. PATIENT-LVL II: CPT | Mod: PBBFAC,,,

## 2021-07-26 PROCEDURE — 99999 PR PBB SHADOW E&M-EST. PATIENT-LVL II: ICD-10-PCS | Mod: PBBFAC,,,

## 2021-07-28 ENCOUNTER — CLINICAL SUPPORT (OUTPATIENT)
Dept: REHABILITATION | Facility: HOSPITAL | Age: 31
End: 2021-07-28
Attending: HOSPITALIST
Payer: MEDICAID

## 2021-07-28 DIAGNOSIS — M62.89 HIGH-TONE PELVIC FLOOR DYSFUNCTION: ICD-10-CM

## 2021-07-28 DIAGNOSIS — M25.552 HIP PAIN, BILATERAL: ICD-10-CM

## 2021-07-28 DIAGNOSIS — R10.2 PELVIC PAIN IN FEMALE: Primary | ICD-10-CM

## 2021-07-28 DIAGNOSIS — R33.9 INCOMPLETE EMPTYING OF BLADDER: ICD-10-CM

## 2021-07-28 DIAGNOSIS — M25.551 HIP PAIN, BILATERAL: ICD-10-CM

## 2021-07-28 DIAGNOSIS — M53.3 COCCYDYNIA: Primary | ICD-10-CM

## 2021-07-28 PROCEDURE — 97162 PT EVAL MOD COMPLEX 30 MIN: CPT | Mod: PO

## 2021-07-28 PROCEDURE — 97140 MANUAL THERAPY 1/> REGIONS: CPT | Mod: PO

## 2021-08-06 ENCOUNTER — CLINICAL SUPPORT (OUTPATIENT)
Dept: REHABILITATION | Facility: HOSPITAL | Age: 31
End: 2021-08-06
Attending: HOSPITALIST
Payer: MEDICAID

## 2021-08-06 DIAGNOSIS — M53.3 COCCYDYNIA: Primary | ICD-10-CM

## 2021-08-06 DIAGNOSIS — R10.2 PELVIC PAIN IN FEMALE: ICD-10-CM

## 2021-08-06 DIAGNOSIS — M62.89 HIGH-TONE PELVIC FLOOR DYSFUNCTION: ICD-10-CM

## 2021-08-06 DIAGNOSIS — M25.559 HIP PAIN: ICD-10-CM

## 2021-08-06 DIAGNOSIS — R33.9 INCOMPLETE EMPTYING OF BLADDER: ICD-10-CM

## 2021-08-06 PROCEDURE — 97140 MANUAL THERAPY 1/> REGIONS: CPT | Mod: PO

## 2021-08-06 PROCEDURE — 97530 THERAPEUTIC ACTIVITIES: CPT | Mod: PO

## 2021-08-10 ENCOUNTER — CLINICAL SUPPORT (OUTPATIENT)
Dept: SMOKING CESSATION | Facility: CLINIC | Age: 31
End: 2021-08-10

## 2021-08-10 DIAGNOSIS — F17.200 NICOTINE DEPENDENCE: Primary | ICD-10-CM

## 2021-08-10 PROCEDURE — 99999 PR PBB SHADOW E&M-EST. PATIENT-LVL I: ICD-10-PCS | Mod: PBBFAC,,,

## 2021-08-10 PROCEDURE — 99402 PR PREVENT COUNSEL,INDIV,30 MIN: ICD-10-PCS | Mod: S$GLB,,,

## 2021-08-10 PROCEDURE — 99999 PR PBB SHADOW E&M-EST. PATIENT-LVL I: CPT | Mod: PBBFAC,,,

## 2021-08-10 PROCEDURE — 99402 PREV MED CNSL INDIV APPRX 30: CPT | Mod: S$GLB,,,

## 2021-08-13 ENCOUNTER — CLINICAL SUPPORT (OUTPATIENT)
Dept: REHABILITATION | Facility: HOSPITAL | Age: 31
End: 2021-08-13
Attending: HOSPITALIST
Payer: MEDICAID

## 2021-08-13 DIAGNOSIS — M53.3 COCCYX PAIN: ICD-10-CM

## 2021-08-13 DIAGNOSIS — M25.559 HIP PAIN: ICD-10-CM

## 2021-08-13 PROCEDURE — 97530 THERAPEUTIC ACTIVITIES: CPT | Mod: PO

## 2021-08-13 PROCEDURE — 97140 MANUAL THERAPY 1/> REGIONS: CPT | Mod: PO

## 2021-08-20 ENCOUNTER — CLINICAL SUPPORT (OUTPATIENT)
Dept: REHABILITATION | Facility: HOSPITAL | Age: 31
End: 2021-08-20
Attending: HOSPITALIST
Payer: MEDICAID

## 2021-08-20 ENCOUNTER — PATIENT MESSAGE (OUTPATIENT)
Dept: REHABILITATION | Facility: HOSPITAL | Age: 31
End: 2021-08-20

## 2021-08-20 DIAGNOSIS — R10.2 PELVIC PAIN IN FEMALE: ICD-10-CM

## 2021-08-20 DIAGNOSIS — R33.9 INCOMPLETE EMPTYING OF BLADDER: ICD-10-CM

## 2021-08-20 DIAGNOSIS — M62.89 HIGH-TONE PELVIC FLOOR DYSFUNCTION: ICD-10-CM

## 2021-08-20 DIAGNOSIS — M25.559 HIP PAIN: ICD-10-CM

## 2021-08-20 DIAGNOSIS — M53.3 COCCYX PAIN: Primary | ICD-10-CM

## 2021-08-20 PROCEDURE — 97530 THERAPEUTIC ACTIVITIES: CPT | Mod: PO

## 2021-08-26 ENCOUNTER — CLINICAL SUPPORT (OUTPATIENT)
Dept: REHABILITATION | Facility: HOSPITAL | Age: 31
End: 2021-08-26
Attending: HOSPITALIST
Payer: MEDICAID

## 2021-08-26 DIAGNOSIS — R10.2 PELVIC PAIN IN FEMALE: ICD-10-CM

## 2021-08-26 DIAGNOSIS — M53.3 COCCYX PAIN: ICD-10-CM

## 2021-08-26 DIAGNOSIS — M62.89 HIGH-TONE PELVIC FLOOR DYSFUNCTION: ICD-10-CM

## 2021-08-26 DIAGNOSIS — M25.559 HIP PAIN: ICD-10-CM

## 2021-08-26 PROCEDURE — 97530 THERAPEUTIC ACTIVITIES: CPT | Mod: PO

## 2021-08-26 PROCEDURE — 97140 MANUAL THERAPY 1/> REGIONS: CPT | Mod: PO

## 2021-09-01 ENCOUNTER — CLINICAL SUPPORT (OUTPATIENT)
Dept: SMOKING CESSATION | Facility: CLINIC | Age: 31
End: 2021-09-01

## 2021-09-01 DIAGNOSIS — F17.200 NICOTINE DEPENDENCE: Primary | ICD-10-CM

## 2021-09-01 PROCEDURE — 99999 PR PBB SHADOW E&M-EST. PATIENT-LVL I: CPT | Mod: PBBFAC,,,

## 2021-09-01 PROCEDURE — 99407 BEHAV CHNG SMOKING > 10 MIN: CPT | Mod: S$GLB,,,

## 2021-09-01 PROCEDURE — 99407 PR TOBACCO USE CESSATION INTENSIVE >10 MINUTES: ICD-10-PCS | Mod: S$GLB,,,

## 2021-09-01 PROCEDURE — 99999 PR PBB SHADOW E&M-EST. PATIENT-LVL I: ICD-10-PCS | Mod: PBBFAC,,,

## 2021-09-09 ENCOUNTER — CLINICAL SUPPORT (OUTPATIENT)
Dept: SMOKING CESSATION | Facility: CLINIC | Age: 31
End: 2021-09-09

## 2021-09-09 DIAGNOSIS — F17.200 NICOTINE DEPENDENCE: Primary | ICD-10-CM

## 2021-09-09 PROCEDURE — 99407 PR TOBACCO USE CESSATION INTENSIVE >10 MINUTES: ICD-10-PCS | Mod: S$GLB,,,

## 2021-09-09 PROCEDURE — 99999 PR PBB SHADOW E&M-EST. PATIENT-LVL I: CPT | Mod: PBBFAC,,,

## 2021-09-09 PROCEDURE — 99407 BEHAV CHNG SMOKING > 10 MIN: CPT | Mod: S$GLB,,,

## 2021-09-09 PROCEDURE — 99999 PR PBB SHADOW E&M-EST. PATIENT-LVL I: ICD-10-PCS | Mod: PBBFAC,,,

## 2021-09-10 ENCOUNTER — CLINICAL SUPPORT (OUTPATIENT)
Dept: REHABILITATION | Facility: OTHER | Age: 31
End: 2021-09-10
Attending: HOSPITALIST
Payer: MEDICAID

## 2021-09-10 DIAGNOSIS — M53.3 COCCYX PAIN: ICD-10-CM

## 2021-09-10 DIAGNOSIS — R10.2 PELVIC PAIN IN FEMALE: ICD-10-CM

## 2021-09-10 DIAGNOSIS — M25.559 HIP PAIN: ICD-10-CM

## 2021-09-10 DIAGNOSIS — M62.89 HIGH-TONE PELVIC FLOOR DYSFUNCTION: Primary | ICD-10-CM

## 2021-09-10 PROCEDURE — 97530 THERAPEUTIC ACTIVITIES: CPT | Mod: PN

## 2021-09-16 ENCOUNTER — CLINICAL SUPPORT (OUTPATIENT)
Dept: SMOKING CESSATION | Facility: CLINIC | Age: 31
End: 2021-09-16

## 2021-09-16 DIAGNOSIS — F17.200 NICOTINE DEPENDENCE: Primary | ICD-10-CM

## 2021-09-16 PROCEDURE — 99402 PR PREVENT COUNSEL,INDIV,30 MIN: ICD-10-PCS | Mod: S$GLB,,,

## 2021-09-16 PROCEDURE — 99999 PR PBB SHADOW E&M-EST. PATIENT-LVL I: CPT | Mod: PBBFAC,,,

## 2021-09-16 PROCEDURE — 99402 PREV MED CNSL INDIV APPRX 30: CPT | Mod: S$GLB,,,

## 2021-09-16 PROCEDURE — 99999 PR PBB SHADOW E&M-EST. PATIENT-LVL I: ICD-10-PCS | Mod: PBBFAC,,,

## 2021-09-17 ENCOUNTER — CLINICAL SUPPORT (OUTPATIENT)
Dept: REHABILITATION | Facility: OTHER | Age: 31
End: 2021-09-17
Attending: HOSPITALIST
Payer: MEDICAID

## 2021-09-17 DIAGNOSIS — R33.9 INCOMPLETE EMPTYING OF BLADDER: ICD-10-CM

## 2021-09-17 DIAGNOSIS — M25.559 HIP PAIN: ICD-10-CM

## 2021-09-17 DIAGNOSIS — M25.552 HIP PAIN, BILATERAL: ICD-10-CM

## 2021-09-17 DIAGNOSIS — M53.3 COCCYDYNIA: ICD-10-CM

## 2021-09-17 DIAGNOSIS — M53.3 COCCYX PAIN: ICD-10-CM

## 2021-09-17 DIAGNOSIS — M25.551 HIP PAIN, BILATERAL: ICD-10-CM

## 2021-09-17 DIAGNOSIS — R10.2 PELVIC PAIN IN FEMALE: ICD-10-CM

## 2021-09-17 DIAGNOSIS — M62.89 HIGH-TONE PELVIC FLOOR DYSFUNCTION: Primary | ICD-10-CM

## 2021-09-17 PROCEDURE — 97140 MANUAL THERAPY 1/> REGIONS: CPT

## 2021-09-22 ENCOUNTER — DOCUMENTATION ONLY (OUTPATIENT)
Dept: REHABILITATION | Facility: OTHER | Age: 31
End: 2021-09-22

## 2021-09-24 ENCOUNTER — CLINICAL SUPPORT (OUTPATIENT)
Dept: REHABILITATION | Facility: OTHER | Age: 31
End: 2021-09-24
Attending: HOSPITALIST
Payer: MEDICAID

## 2021-09-24 ENCOUNTER — DOCUMENTATION ONLY (OUTPATIENT)
Dept: REHABILITATION | Facility: OTHER | Age: 31
End: 2021-09-24

## 2021-09-24 DIAGNOSIS — M25.551 HIP PAIN, BILATERAL: ICD-10-CM

## 2021-09-24 DIAGNOSIS — M62.89 HIGH-TONE PELVIC FLOOR DYSFUNCTION: Primary | ICD-10-CM

## 2021-09-24 DIAGNOSIS — R10.2 PELVIC PAIN IN FEMALE: ICD-10-CM

## 2021-09-24 DIAGNOSIS — M53.3 COCCYDYNIA: ICD-10-CM

## 2021-09-24 DIAGNOSIS — M53.3 COCCYX PAIN: ICD-10-CM

## 2021-09-24 DIAGNOSIS — R33.9 INCOMPLETE EMPTYING OF BLADDER: ICD-10-CM

## 2021-09-24 DIAGNOSIS — M25.559 HIP PAIN: ICD-10-CM

## 2021-09-24 DIAGNOSIS — M25.552 HIP PAIN, BILATERAL: ICD-10-CM

## 2021-09-24 PROCEDURE — 97140 MANUAL THERAPY 1/> REGIONS: CPT

## 2021-09-29 ENCOUNTER — PATIENT MESSAGE (OUTPATIENT)
Dept: SURGERY | Facility: CLINIC | Age: 31
End: 2021-09-29

## 2021-09-29 ENCOUNTER — OFFICE VISIT (OUTPATIENT)
Dept: SURGERY | Facility: CLINIC | Age: 31
End: 2021-09-29
Attending: COLON & RECTAL SURGERY
Payer: MEDICAID

## 2021-09-29 VITALS
DIASTOLIC BLOOD PRESSURE: 74 MMHG | HEART RATE: 99 BPM | WEIGHT: 149.06 LBS | BODY MASS INDEX: 25.58 KG/M2 | SYSTOLIC BLOOD PRESSURE: 121 MMHG | OXYGEN SATURATION: 100 %

## 2021-09-29 DIAGNOSIS — L29.0 ANAL ITCHING: Primary | ICD-10-CM

## 2021-09-29 PROCEDURE — 99213 OFFICE O/P EST LOW 20 MIN: CPT | Mod: S$PBB,,, | Performed by: COLON & RECTAL SURGERY

## 2021-09-29 PROCEDURE — 99999 PR PBB SHADOW E&M-EST. PATIENT-LVL III: CPT | Mod: PBBFAC,,, | Performed by: COLON & RECTAL SURGERY

## 2021-09-29 PROCEDURE — 99999 PR PBB SHADOW E&M-EST. PATIENT-LVL III: ICD-10-PCS | Mod: PBBFAC,,, | Performed by: COLON & RECTAL SURGERY

## 2021-09-29 PROCEDURE — 99213 OFFICE O/P EST LOW 20 MIN: CPT | Mod: PBBFAC | Performed by: COLON & RECTAL SURGERY

## 2021-09-29 PROCEDURE — 99213 PR OFFICE/OUTPT VISIT, EST, LEVL III, 20-29 MIN: ICD-10-PCS | Mod: S$PBB,,, | Performed by: COLON & RECTAL SURGERY

## 2021-09-29 RX ORDER — GABAPENTIN 100 MG/1
300 CAPSULE ORAL 3 TIMES DAILY
Qty: 270 CAPSULE | Refills: 11 | Status: SHIPPED | OUTPATIENT
Start: 2021-09-29 | End: 2022-09-29

## 2021-09-29 RX ORDER — GABAPENTIN 300 MG/1
300 CAPSULE ORAL DAILY
Qty: 15 CAPSULE | Refills: 0 | Status: SHIPPED | OUTPATIENT
Start: 2021-09-29 | End: 2021-10-13

## 2021-09-29 RX ORDER — VENLAFAXINE 37.5 MG/1
TABLET ORAL ONCE
COMMUNITY
End: 2022-03-22

## 2021-09-29 RX ORDER — BUSPIRONE HYDROCHLORIDE 15 MG/1
15 TABLET ORAL 3 TIMES DAILY
COMMUNITY
End: 2022-05-11 | Stop reason: SDUPTHER

## 2021-10-01 ENCOUNTER — CLINICAL SUPPORT (OUTPATIENT)
Dept: REHABILITATION | Facility: HOSPITAL | Age: 31
End: 2021-10-01
Attending: HOSPITALIST
Payer: MEDICAID

## 2021-10-01 DIAGNOSIS — R33.9 INCOMPLETE EMPTYING OF BLADDER: ICD-10-CM

## 2021-10-01 DIAGNOSIS — M53.3 COCCYX PAIN: ICD-10-CM

## 2021-10-01 DIAGNOSIS — M53.3 COCCYDYNIA: ICD-10-CM

## 2021-10-01 DIAGNOSIS — M25.551 HIP PAIN, BILATERAL: ICD-10-CM

## 2021-10-01 DIAGNOSIS — M25.559 HIP PAIN: ICD-10-CM

## 2021-10-01 DIAGNOSIS — M25.552 HIP PAIN, BILATERAL: ICD-10-CM

## 2021-10-01 DIAGNOSIS — M62.89 HIGH-TONE PELVIC FLOOR DYSFUNCTION: Primary | ICD-10-CM

## 2021-10-01 DIAGNOSIS — R10.2 PELVIC PAIN IN FEMALE: ICD-10-CM

## 2021-10-01 PROCEDURE — 97140 MANUAL THERAPY 1/> REGIONS: CPT | Mod: PO

## 2021-10-01 PROCEDURE — 97530 THERAPEUTIC ACTIVITIES: CPT | Mod: PO

## 2021-10-05 ENCOUNTER — PATIENT MESSAGE (OUTPATIENT)
Dept: INTERNAL MEDICINE | Facility: CLINIC | Age: 31
End: 2021-10-05

## 2021-10-07 ENCOUNTER — OFFICE VISIT (OUTPATIENT)
Dept: INTERNAL MEDICINE | Facility: CLINIC | Age: 31
End: 2021-10-07
Payer: MEDICAID

## 2021-10-07 ENCOUNTER — PATIENT MESSAGE (OUTPATIENT)
Dept: INTERNAL MEDICINE | Facility: CLINIC | Age: 31
End: 2021-10-07

## 2021-10-07 ENCOUNTER — CLINICAL SUPPORT (OUTPATIENT)
Dept: SMOKING CESSATION | Facility: CLINIC | Age: 31
End: 2021-10-07

## 2021-10-07 DIAGNOSIS — M24.9 HYPERMOBILE JOINTS: Primary | ICD-10-CM

## 2021-10-07 DIAGNOSIS — M53.3 COCCYDYNIA: ICD-10-CM

## 2021-10-07 DIAGNOSIS — F17.200 NICOTINE DEPENDENCE: Primary | ICD-10-CM

## 2021-10-07 PROCEDURE — 99999 PR PBB SHADOW E&M-EST. PATIENT-LVL II: ICD-10-PCS | Mod: PBBFAC,,,

## 2021-10-07 PROCEDURE — 99402 PREV MED CNSL INDIV APPRX 30: CPT | Mod: S$GLB,,,

## 2021-10-07 PROCEDURE — 99999 PR PBB SHADOW E&M-EST. PATIENT-LVL II: CPT | Mod: PBBFAC,,,

## 2021-10-07 PROCEDURE — 99402 PR PREVENT COUNSEL,INDIV,30 MIN: ICD-10-PCS | Mod: S$GLB,,,

## 2021-10-07 PROCEDURE — 99212 PR OFFICE/OUTPT VISIT, EST, LEVL II, 10-19 MIN: ICD-10-PCS | Mod: 95,,, | Performed by: HOSPITALIST

## 2021-10-07 PROCEDURE — 99212 OFFICE O/P EST SF 10 MIN: CPT | Mod: 95,,, | Performed by: HOSPITALIST

## 2021-10-11 ENCOUNTER — CLINICAL SUPPORT (OUTPATIENT)
Dept: REHABILITATION | Facility: HOSPITAL | Age: 31
End: 2021-10-11
Payer: MEDICAID

## 2021-10-11 DIAGNOSIS — M25.559 HIP PAIN: ICD-10-CM

## 2021-10-11 DIAGNOSIS — M53.3 COCCYDYNIA: ICD-10-CM

## 2021-10-11 DIAGNOSIS — M25.552 HIP PAIN, BILATERAL: ICD-10-CM

## 2021-10-11 DIAGNOSIS — M25.551 HIP PAIN, BILATERAL: ICD-10-CM

## 2021-10-11 DIAGNOSIS — M53.3 COCCYX PAIN: ICD-10-CM

## 2021-10-11 DIAGNOSIS — R10.2 PELVIC PAIN IN FEMALE: ICD-10-CM

## 2021-10-11 DIAGNOSIS — M62.89 HIGH-TONE PELVIC FLOOR DYSFUNCTION: Primary | ICD-10-CM

## 2021-10-11 PROCEDURE — 97140 MANUAL THERAPY 1/> REGIONS: CPT | Mod: PO

## 2021-10-11 PROCEDURE — 97530 THERAPEUTIC ACTIVITIES: CPT | Mod: PO

## 2021-10-13 RX ORDER — GABAPENTIN 300 MG/1
300 CAPSULE ORAL DAILY
Qty: 15 CAPSULE | Refills: 0 | Status: CANCELLED | OUTPATIENT
Start: 2021-10-13 | End: 2021-10-28

## 2021-10-22 ENCOUNTER — CLINICAL SUPPORT (OUTPATIENT)
Dept: REHABILITATION | Facility: HOSPITAL | Age: 31
End: 2021-10-22
Payer: MEDICAID

## 2021-10-22 DIAGNOSIS — M53.3 COCCYDYNIA: ICD-10-CM

## 2021-10-22 DIAGNOSIS — M62.89 HIGH-TONE PELVIC FLOOR DYSFUNCTION: ICD-10-CM

## 2021-10-22 DIAGNOSIS — R33.9 INCOMPLETE EMPTYING OF BLADDER: ICD-10-CM

## 2021-10-22 DIAGNOSIS — R10.2 PELVIC PAIN IN FEMALE: ICD-10-CM

## 2021-10-22 DIAGNOSIS — M25.559 HIP PAIN: ICD-10-CM

## 2021-10-22 DIAGNOSIS — M53.3 COCCYX PAIN: ICD-10-CM

## 2021-10-22 DIAGNOSIS — M25.551 HIP PAIN, BILATERAL: ICD-10-CM

## 2021-10-22 DIAGNOSIS — M25.552 HIP PAIN, BILATERAL: ICD-10-CM

## 2021-10-22 PROCEDURE — 97530 THERAPEUTIC ACTIVITIES: CPT | Mod: PO

## 2021-10-22 PROCEDURE — 97140 MANUAL THERAPY 1/> REGIONS: CPT | Mod: PO

## 2021-10-28 ENCOUNTER — CLINICAL SUPPORT (OUTPATIENT)
Dept: REHABILITATION | Facility: HOSPITAL | Age: 31
End: 2021-10-28
Attending: HOSPITALIST
Payer: MEDICAID

## 2021-10-28 ENCOUNTER — PATIENT MESSAGE (OUTPATIENT)
Dept: REHABILITATION | Facility: HOSPITAL | Age: 31
End: 2021-10-28

## 2021-10-28 DIAGNOSIS — M25.551 HIP PAIN, BILATERAL: ICD-10-CM

## 2021-10-28 DIAGNOSIS — M25.559 HIP PAIN: ICD-10-CM

## 2021-10-28 DIAGNOSIS — M25.552 HIP PAIN, BILATERAL: ICD-10-CM

## 2021-10-28 DIAGNOSIS — M62.89 HIGH-TONE PELVIC FLOOR DYSFUNCTION: ICD-10-CM

## 2021-10-28 DIAGNOSIS — M53.3 COCCYX PAIN: ICD-10-CM

## 2021-10-28 DIAGNOSIS — M53.3 COCCYDYNIA: ICD-10-CM

## 2021-10-28 DIAGNOSIS — R33.9 INCOMPLETE EMPTYING OF BLADDER: ICD-10-CM

## 2021-10-28 DIAGNOSIS — R10.2 PELVIC PAIN IN FEMALE: ICD-10-CM

## 2021-10-28 PROCEDURE — 97140 MANUAL THERAPY 1/> REGIONS: CPT | Mod: PO

## 2021-11-03 ENCOUNTER — CLINICAL SUPPORT (OUTPATIENT)
Dept: SMOKING CESSATION | Facility: CLINIC | Age: 31
End: 2021-11-03

## 2021-11-03 DIAGNOSIS — F17.200 NICOTINE DEPENDENCE: Primary | ICD-10-CM

## 2021-11-03 PROCEDURE — 99407 BEHAV CHNG SMOKING > 10 MIN: CPT | Mod: S$GLB,,,

## 2021-11-03 PROCEDURE — 99407 PR TOBACCO USE CESSATION INTENSIVE >10 MINUTES: ICD-10-PCS | Mod: S$GLB,,,

## 2021-11-03 PROCEDURE — 99999 PR PBB SHADOW E&M-EST. PATIENT-LVL I: CPT | Mod: PBBFAC,,,

## 2021-11-03 PROCEDURE — 99999 PR PBB SHADOW E&M-EST. PATIENT-LVL I: ICD-10-PCS | Mod: PBBFAC,,,

## 2021-11-08 ENCOUNTER — PATIENT MESSAGE (OUTPATIENT)
Dept: REHABILITATION | Facility: HOSPITAL | Age: 31
End: 2021-11-08
Payer: MEDICARE

## 2021-11-15 ENCOUNTER — CLINICAL SUPPORT (OUTPATIENT)
Dept: REHABILITATION | Facility: HOSPITAL | Age: 31
End: 2021-11-15
Attending: HOSPITALIST
Payer: MEDICAID

## 2021-11-15 DIAGNOSIS — M25.551 HIP PAIN, BILATERAL: ICD-10-CM

## 2021-11-15 DIAGNOSIS — M53.3 COCCYDYNIA: ICD-10-CM

## 2021-11-15 DIAGNOSIS — M25.552 HIP PAIN, BILATERAL: ICD-10-CM

## 2021-11-15 DIAGNOSIS — R10.2 PELVIC PAIN IN FEMALE: ICD-10-CM

## 2021-11-15 DIAGNOSIS — M25.559 HIP PAIN: ICD-10-CM

## 2021-11-15 DIAGNOSIS — M62.89 HIGH-TONE PELVIC FLOOR DYSFUNCTION: ICD-10-CM

## 2021-11-15 DIAGNOSIS — M53.3 COCCYX PAIN: ICD-10-CM

## 2021-11-15 DIAGNOSIS — R33.9 INCOMPLETE EMPTYING OF BLADDER: ICD-10-CM

## 2021-11-15 PROCEDURE — 97530 THERAPEUTIC ACTIVITIES: CPT | Mod: PO

## 2021-11-15 PROCEDURE — 97140 MANUAL THERAPY 1/> REGIONS: CPT | Mod: PO

## 2021-11-17 ENCOUNTER — TELEPHONE (OUTPATIENT)
Dept: SMOKING CESSATION | Facility: CLINIC | Age: 31
End: 2021-11-17
Payer: MEDICARE

## 2021-11-23 ENCOUNTER — TELEPHONE (OUTPATIENT)
Dept: SMOKING CESSATION | Facility: CLINIC | Age: 31
End: 2021-11-23
Payer: MEDICARE

## 2021-11-29 ENCOUNTER — CLINICAL SUPPORT (OUTPATIENT)
Dept: REHABILITATION | Facility: HOSPITAL | Age: 31
End: 2021-11-29
Attending: HOSPITALIST
Payer: MEDICAID

## 2021-11-29 DIAGNOSIS — M25.552 HIP PAIN, BILATERAL: ICD-10-CM

## 2021-11-29 DIAGNOSIS — M53.3 COCCYDYNIA: ICD-10-CM

## 2021-11-29 DIAGNOSIS — M25.559 HIP PAIN: ICD-10-CM

## 2021-11-29 DIAGNOSIS — M25.551 HIP PAIN, BILATERAL: ICD-10-CM

## 2021-11-29 DIAGNOSIS — R10.2 PELVIC PAIN IN FEMALE: ICD-10-CM

## 2021-11-29 DIAGNOSIS — R33.9 INCOMPLETE EMPTYING OF BLADDER: ICD-10-CM

## 2021-11-29 DIAGNOSIS — M53.3 COCCYX PAIN: ICD-10-CM

## 2021-11-29 DIAGNOSIS — M62.89 HIGH-TONE PELVIC FLOOR DYSFUNCTION: ICD-10-CM

## 2021-11-29 PROCEDURE — 97530 THERAPEUTIC ACTIVITIES: CPT | Mod: PO

## 2021-11-29 PROCEDURE — 97140 MANUAL THERAPY 1/> REGIONS: CPT | Mod: PO

## 2021-12-04 ENCOUNTER — IMMUNIZATION (OUTPATIENT)
Dept: PRIMARY CARE CLINIC | Facility: CLINIC | Age: 31
End: 2021-12-04
Payer: MEDICAID

## 2021-12-04 DIAGNOSIS — Z23 NEED FOR VACCINATION: Primary | ICD-10-CM

## 2021-12-04 PROCEDURE — 0064A COVID-19, MRNA, LNP-S, PF, 100 MCG/0.25 ML DOSE VACCINE (MODERNA BOOSTER): CPT | Mod: CV19,PBBFAC | Performed by: INTERNAL MEDICINE

## 2021-12-09 ENCOUNTER — PATIENT MESSAGE (OUTPATIENT)
Dept: REHABILITATION | Facility: HOSPITAL | Age: 31
End: 2021-12-09
Payer: MEDICARE

## 2021-12-09 ENCOUNTER — PATIENT MESSAGE (OUTPATIENT)
Dept: INTERNAL MEDICINE | Facility: CLINIC | Age: 31
End: 2021-12-09
Payer: MEDICARE

## 2021-12-09 DIAGNOSIS — R10.2 PELVIC PAIN IN FEMALE: ICD-10-CM

## 2021-12-09 DIAGNOSIS — M62.89 HIGH-TONE PELVIC FLOOR DYSFUNCTION: ICD-10-CM

## 2021-12-09 DIAGNOSIS — M53.3 COCCYDYNIA: Primary | ICD-10-CM

## 2021-12-26 ENCOUNTER — LAB VISIT (OUTPATIENT)
Dept: PRIMARY CARE CLINIC | Facility: OTHER | Age: 31
End: 2021-12-26
Payer: MEDICAID

## 2021-12-26 ENCOUNTER — PATIENT MESSAGE (OUTPATIENT)
Dept: ADMINISTRATIVE | Facility: OTHER | Age: 31
End: 2021-12-26
Payer: MEDICARE

## 2021-12-26 DIAGNOSIS — R05.9 COUGH: ICD-10-CM

## 2021-12-26 PROCEDURE — U0003 INFECTIOUS AGENT DETECTION BY NUCLEIC ACID (DNA OR RNA); SEVERE ACUTE RESPIRATORY SYNDROME CORONAVIRUS 2 (SARS-COV-2) (CORONAVIRUS DISEASE [COVID-19]), AMPLIFIED PROBE TECHNIQUE, MAKING USE OF HIGH THROUGHPUT TECHNOLOGIES AS DESCRIBED BY CMS-2020-01-R: HCPCS | Performed by: INTERNAL MEDICINE

## 2021-12-27 LAB
SARS-COV-2 RNA RESP QL NAA+PROBE: NOT DETECTED
SARS-COV-2- CYCLE NUMBER: NORMAL

## 2022-01-24 ENCOUNTER — HOSPITAL ENCOUNTER (EMERGENCY)
Facility: HOSPITAL | Age: 32
Discharge: HOME | End: 2022-01-25
Attending: EMERGENCY MEDICINE
Payer: MEDICAID

## 2022-01-24 VITALS
HEART RATE: 90 BPM | DIASTOLIC BLOOD PRESSURE: 68 MMHG | TEMPERATURE: 98 F | OXYGEN SATURATION: 100 % | SYSTOLIC BLOOD PRESSURE: 119 MMHG | RESPIRATION RATE: 15 BRPM

## 2022-01-24 DIAGNOSIS — S51.851A DOG BITE OF RIGHT FOREARM, INITIAL ENCOUNTER: ICD-10-CM
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
## 2022-01-24 DIAGNOSIS — S61.451A DOG BITE OF RIGHT HAND, INITIAL ENCOUNTER: Primary | ICD-10-CM

## 2022-01-24 PROCEDURE — 99283 EMERGENCY DEPT VISIT LOW MDM: CPT | Mod: 25

## 2022-01-24 PROCEDURE — 99284 PR EMERGENCY DEPT VISIT,LEVEL IV: ICD-10-PCS | Mod: ,,, | Performed by: PHYSICIAN ASSISTANT

## 2022-01-24 PROCEDURE — 99284 EMERGENCY DEPT VISIT MOD MDM: CPT | Mod: ,,, | Performed by: PHYSICIAN ASSISTANT

## 2022-01-24 PROCEDURE — 25000003 PHARM REV CODE 250: Performed by: PHYSICIAN ASSISTANT

## 2022-01-24 PROCEDURE — 25000003 PHARM REV CODE 250: Performed by: EMERGENCY MEDICINE

## 2022-01-24 RX ORDER — CLINDAMYCIN HYDROCHLORIDE 300 MG/1
300 CAPSULE ORAL EVERY 8 HOURS
Qty: 30 CAPSULE | Refills: 0 | Status: SHIPPED | OUTPATIENT
Start: 2022-01-24 | End: 2022-02-03

## 2022-01-24 RX ORDER — IBUPROFEN 400 MG/1
400 TABLET ORAL
Status: COMPLETED | OUTPATIENT
Start: 2022-01-24 | End: 2022-01-24

## 2022-01-24 RX ORDER — IBUPROFEN 400 MG/1
400 TABLET ORAL
Status: DISCONTINUED | OUTPATIENT
Start: 2022-01-24 | End: 2022-01-24 | Stop reason: CLARIF

## 2022-01-24 RX ORDER — CLINDAMYCIN HYDROCHLORIDE 150 MG/1
300 CAPSULE ORAL
Status: COMPLETED | OUTPATIENT
Start: 2022-01-24 | End: 2022-01-24

## 2022-01-24 RX ORDER — ONDANSETRON 4 MG/1
4 TABLET, ORALLY DISINTEGRATING ORAL
Status: COMPLETED | OUTPATIENT
Start: 2022-01-24 | End: 2022-01-24

## 2022-01-24 RX ADMIN — ONDANSETRON 4 MG: 4 TABLET, ORALLY DISINTEGRATING ORAL at 11:01

## 2022-01-24 RX ADMIN — BACITRACIN, NEOMYCIN, POLYMYXIN B 5 EACH: 400; 3.5; 5 OINTMENT TOPICAL at 11:01

## 2022-01-24 RX ADMIN — IBUPROFEN 400 MG: 400 TABLET, FILM COATED ORAL at 11:01

## 2022-01-24 RX ADMIN — CLINDAMYCIN HYDROCHLORIDE 300 MG: 150 CAPSULE ORAL at 11:01

## 2022-01-25 ENCOUNTER — TELEPHONE (OUTPATIENT)
Dept: INTERNAL MEDICINE | Facility: CLINIC | Age: 32
End: 2022-01-25
Payer: MEDICARE

## 2022-01-25 NOTE — ED NOTES
Ada Ramirez, a 31 y.o. female presents to the ED w/ complaint of dog bite occurring today pt has 7 wounds noted to right hand/arm pt repots dog up to date on immunizations pt reports up to date on tetanus     Triage note:  Chief Complaint   Patient presents with    Animal Bite     Bit by friends dog on right hand and arm. Multiple small puncture wounds noted. Bleeding controlled. Reports dog is up to date on all immunizations      Review of patient's allergies indicates:   Allergen Reactions    Cephalosporins Rash    Penicillins Rash    Sulfa (sulfonamide antibiotics) Rash    Corn containing products Diarrhea and Nausea And Vomiting    Eggs [egg derived] Nausea Only    Latex, natural rubber Dermatitis and Rash     Past Medical History:   Diagnosis Date    Allergy       LOC: The patient is awake, alert and aware of environment with an appropriate affect, the patient is oriented x 3 and speaking appropriately.   APPEARANCE: Patient appears comfortable and in no acute distress, patient is clean and well groomed.  SKIN: 7 wounds noted to right arm/ hand   MUSCULOSKELETAL: Patient moving all extremities spontaneously, no swelling noted.  RESPIRATORY: Airway is open and patent, respirations are spontaneous, patient has a normal effort and rate, no accessory muscle use noted,with O2 sats noted at 97% on room air.  CARDIAC: Patient has a normal rate and regular rhythm, no edema noted, capillary refill < 3 seconds.   GASTRO: Soft and non tender to palpation, no distention noted, normoactive bowel sounds present in all four quadrants. Pt states bowel movements have been regular.  : Pt denies any pain or frequency with urination.  NEURO: Pt opens eyes spontaneously, behavior appropriate to situation, follows commands, facial expression symmetrical, bilateral hand grasp equal and even, purposeful motor response noted, normal sensation in all extremities when touched with a finger.

## 2022-01-25 NOTE — ED NOTES
Patient's wounds on the R FA, wrist and hand were irrigated with 500cc's of sterile normal saline utilizing the tip of an 18g angio, wounds were then cleansed with chlorahexidine and betadine, triple antibiotic ointment applied to the wounds covered with telfa, magaly and coban.  Patient was provided with an ace wrap to take home with her and apply if needed.  Xray called to let them know that I was completed and they could come to take the films.

## 2022-01-25 NOTE — ED NOTES
Patient was provided with her discharge instructions, follow-up instructions and wound care reviewed.  She was also provided with her cleocin prescriptions and stated that she would call her PCP to make an appointment for a wound check in 2 day.  Also, she is going to get the antibiotic at [facility]. Patient ambulated out of intake 4 without any difficulty steady gait accompanied by her girlfriend.  No distress noted at the time of triage.

## 2022-01-27 ENCOUNTER — CLINICAL SUPPORT (OUTPATIENT)
Dept: REHABILITATION | Facility: HOSPITAL | Age: 32
End: 2022-01-27
Attending: HOSPITALIST
Payer: MEDICARE

## 2022-01-27 DIAGNOSIS — R10.2 PELVIC PAIN IN FEMALE: ICD-10-CM

## 2022-01-27 DIAGNOSIS — M53.3 COCCYDYNIA: ICD-10-CM

## 2022-01-27 DIAGNOSIS — M62.89 HIGH-TONE PELVIC FLOOR DYSFUNCTION: ICD-10-CM

## 2022-01-27 DIAGNOSIS — M25.559 HIP PAIN: ICD-10-CM

## 2022-01-27 DIAGNOSIS — M53.3 COCCYX PAIN: ICD-10-CM

## 2022-01-27 PROCEDURE — 97162 PT EVAL MOD COMPLEX 30 MIN: CPT | Mod: PO

## 2022-01-27 PROCEDURE — 97530 THERAPEUTIC ACTIVITIES: CPT | Mod: PO

## 2022-01-27 PROCEDURE — 97140 MANUAL THERAPY 1/> REGIONS: CPT | Mod: PO

## 2022-01-28 ENCOUNTER — OFFICE VISIT (OUTPATIENT)
Dept: INTERNAL MEDICINE | Facility: CLINIC | Age: 32
End: 2022-01-28
Payer: MEDICAID

## 2022-01-28 VITALS
SYSTOLIC BLOOD PRESSURE: 118 MMHG | WEIGHT: 142.19 LBS | BODY MASS INDEX: 24.28 KG/M2 | HEIGHT: 64 IN | HEART RATE: 98 BPM | OXYGEN SATURATION: 98 % | DIASTOLIC BLOOD PRESSURE: 66 MMHG | TEMPERATURE: 97 F | RESPIRATION RATE: 18 BRPM

## 2022-01-28 DIAGNOSIS — Z09 FOLLOW UP: Primary | ICD-10-CM

## 2022-01-28 DIAGNOSIS — R11.0 NAUSEA: ICD-10-CM
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
## 2022-01-28 PROBLEM — M62.89 HIGH-TONE PELVIC FLOOR DYSFUNCTION: Status: ACTIVE | Noted: 2022-01-28

## 2022-01-28 PROBLEM — R10.2 PELVIC PAIN IN FEMALE: Status: ACTIVE | Noted: 2022-01-28

## 2022-01-28 PROCEDURE — 3008F PR BODY MASS INDEX (BMI) DOCUMENTED: ICD-10-PCS | Mod: CPTII,,, | Performed by: FAMILY MEDICINE

## 2022-01-28 PROCEDURE — 3078F DIAST BP <80 MM HG: CPT | Mod: CPTII,,, | Performed by: FAMILY MEDICINE

## 2022-01-28 PROCEDURE — 99215 OFFICE O/P EST HI 40 MIN: CPT | Mod: PBBFAC,PO | Performed by: FAMILY MEDICINE

## 2022-01-28 PROCEDURE — 1160F PR REVIEW ALL MEDS BY PRESCRIBER/CLIN PHARMACIST DOCUMENTED: ICD-10-PCS | Mod: CPTII,,, | Performed by: FAMILY MEDICINE

## 2022-01-28 PROCEDURE — 99999 PR PBB SHADOW E&M-EST. PATIENT-LVL V: ICD-10-PCS | Mod: PBBFAC,,, | Performed by: FAMILY MEDICINE

## 2022-01-28 PROCEDURE — 99999 PR PBB SHADOW E&M-EST. PATIENT-LVL V: CPT | Mod: PBBFAC,,, | Performed by: FAMILY MEDICINE

## 2022-01-28 PROCEDURE — 3078F PR MOST RECENT DIASTOLIC BLOOD PRESSURE < 80 MM HG: ICD-10-PCS | Mod: CPTII,,, | Performed by: FAMILY MEDICINE

## 2022-01-28 PROCEDURE — 1159F MED LIST DOCD IN RCRD: CPT | Mod: CPTII,,, | Performed by: FAMILY MEDICINE

## 2022-01-28 PROCEDURE — 3008F BODY MASS INDEX DOCD: CPT | Mod: CPTII,,, | Performed by: FAMILY MEDICINE

## 2022-01-28 PROCEDURE — 1160F RVW MEDS BY RX/DR IN RCRD: CPT | Mod: CPTII,,, | Performed by: FAMILY MEDICINE

## 2022-01-28 PROCEDURE — 99214 OFFICE O/P EST MOD 30 MIN: CPT | Mod: S$PBB,,, | Performed by: FAMILY MEDICINE

## 2022-01-28 PROCEDURE — 3074F SYST BP LT 130 MM HG: CPT | Mod: CPTII,,, | Performed by: FAMILY MEDICINE

## 2022-01-28 PROCEDURE — 3074F PR MOST RECENT SYSTOLIC BLOOD PRESSURE < 130 MM HG: ICD-10-PCS | Mod: CPTII,,, | Performed by: FAMILY MEDICINE

## 2022-01-28 PROCEDURE — 1159F PR MEDICATION LIST DOCUMENTED IN MEDICAL RECORD: ICD-10-PCS | Mod: CPTII,,, | Performed by: FAMILY MEDICINE

## 2022-01-28 PROCEDURE — 99214 PR OFFICE/OUTPT VISIT, EST, LEVL IV, 30-39 MIN: ICD-10-PCS | Mod: S$PBB,,, | Performed by: FAMILY MEDICINE

## 2022-01-28 RX ORDER — ONDANSETRON 8 MG/1
8 TABLET, ORALLY DISINTEGRATING ORAL 3 TIMES DAILY PRN
Qty: 30 TABLET | Refills: 0 | Status: SHIPPED | OUTPATIENT
Start: 2022-01-28 | End: 2022-03-22

## 2022-01-28 RX ORDER — MUPIROCIN 20 MG/G
OINTMENT TOPICAL 2 TIMES DAILY
Qty: 22 G | Refills: 0 | Status: SHIPPED | OUTPATIENT
Start: 2022-01-28 | End: 2022-03-22

## 2022-01-28 NOTE — PROGRESS NOTES
Subjective:       Patient ID: Ada Ramirez is a 31 y.o. female.    Chief Complaint: Hospital Follow Up (Dog bite; puncture wounds on right FA and right hand )    HPI 31-year-old white female patient of Dr. Cunningham presents to clinic today for an emergency room follow-up secondary to a dog bite.  She was seen in the emergency room 4 days ago secondary to suffering multiple dog bites to the right forearm and right hand.  The patient is up-to-date with tetanus vaccination and the dog was up-to-date with all vaccinations.  The patient was started on clindamycin for skin wound prophylaxis.  Wounds are currently well-healing but the patient is still experiencing surrounding bruising and is also noting mild nausea from taking the antibiotics.  She denies any vomiting or diarrhea.  Review of Systems   Constitutional: Negative for appetite change, chills, fatigue and fever.   HENT: Negative for nasal congestion, ear pain, hearing loss, postnasal drip, rhinorrhea, sinus pressure/congestion, sore throat and tinnitus.    Eyes: Negative for redness, itching and visual disturbance.   Respiratory: Negative for cough, chest tightness and shortness of breath.    Cardiovascular: Negative for chest pain and palpitations.   Gastrointestinal: Positive for nausea. Negative for abdominal pain, constipation, diarrhea and vomiting.   Genitourinary: Negative for decreased urine volume, difficulty urinating, dysuria, frequency, hematuria and urgency.   Musculoskeletal: Negative for back pain, myalgias, neck pain and neck stiffness.   Integumentary:  Positive for wound. Negative for rash.   Neurological: Negative for dizziness, light-headedness and headaches.   Psychiatric/Behavioral: Negative.          Objective:      Physical Exam  Vitals and nursing note reviewed.   Constitutional:       General: She is not in acute distress.     Appearance: She is well-developed and well-nourished. She is not diaphoretic.   HENT:      Head: Normocephalic  and atraumatic.      Right Ear: External ear normal.      Left Ear: External ear normal.      Nose: Nose normal.      Mouth/Throat:      Mouth: Oropharynx is clear and moist.      Pharynx: No oropharyngeal exudate.   Eyes:      General: No scleral icterus.        Right eye: No discharge.         Left eye: No discharge.      Extraocular Movements: EOM normal.      Conjunctiva/sclera: Conjunctivae normal.      Pupils: Pupils are equal, round, and reactive to light.   Neck:      Thyroid: No thyromegaly.      Vascular: No JVD.      Trachea: No tracheal deviation.   Cardiovascular:      Rate and Rhythm: Normal rate and regular rhythm.      Pulses: Intact distal pulses.      Heart sounds: Normal heart sounds. No murmur heard.  No friction rub. No gallop.    Pulmonary:      Effort: Pulmonary effort is normal. No respiratory distress.      Breath sounds: Normal breath sounds. No stridor. No wheezing or rales.   Abdominal:      General: Bowel sounds are normal. There is no distension.      Palpations: Abdomen is soft. There is no mass.      Tenderness: There is no abdominal tenderness. There is no guarding or rebound.   Musculoskeletal:         General: No tenderness or edema. Normal range of motion.      Right forearm: Laceration present.      Right hand: Laceration present.      Cervical back: Normal range of motion and neck supple.      Comments: Multiple superficial puncture wounds to the right forearm and hand.  There is no purulent drainage or bleeding noted.  There is mild bruising noted to the mid forearm   Lymphadenopathy:      Cervical: No cervical adenopathy.   Skin:     General: Skin is warm and dry.      Coloration: Skin is not pale.      Findings: No erythema or rash.   Neurological:      Mental Status: She is alert and oriented to person, place, and time.   Psychiatric:         Mood and Affect: Mood and affect normal.         Behavior: Behavior normal.         Thought Content: Thought content normal.          Judgment: Judgment normal.         Assessment:       Problem List Items Addressed This Visit    None     Visit Diagnoses     Follow up    -  Primary    Dog bite, subsequent encounter        Relevant Medications    mupirocin (BACTROBAN) 2 % ointment    Nausea        Relevant Medications    ondansetron (ZOFRAN-ODT) 8 MG TbDL          Plan:       1. Wound care discussed and handout given.  2. Recommend use of Bactroban ointment 1 application to the affected area twice daily.  3. Recommend use of probiotics to assist with nausea and also Zofran 8 mg ODT t.i.d. p.r.n. nausea.  4. Follow-up as needed if symptoms persist or worsen.

## 2022-01-28 NOTE — PLAN OF CARE
"[facility]  Pelvic Health Physical Therapy Initial Evaluation    Date: 1/27/2022   Name: Ada Ramirez  Clinic Number: [id number]  Therapy Diagnosis:   Encounter Diagnoses   Name Primary?    Coccydynia     Pelvic pain in female     High-tone pelvic floor dysfunction     Hip pain     Coccyx pain      Physician: Molly Cunningham MD    Physician Orders: Pelvic PT Eval and Treat  Medical Diagnosis from Referral: Coccydynia [M53.3], Pelvic pain in female [R10.2], High-tone pelvic floor dysfunction [N94.89]  Evaluation Date: 1/27/2022  Authorization Period Expiration: 12/13/22  Plan of Care Expiration: 5/18/22  Visit # / Visits authorized: 1/ 1    Time In: 10:00  Time Out: 10:05  Total Appointment Time (timed & untimed codes): 65 minutes    Precautions: universal    Subjective     Ada is a return patient of mine. See below for prior visit information in quotes:    FROM 7/28/2021 VISIT: "Date of onset: Two years ago after a road trip to [place]     History of current condition - Ada reports: She took a road trip to [place] two years ago and has had tailbone pain ever since. She was referred to pain management and received two nerve blocks that lasted 6 months and 3 months respectively. The last one she received was in January 2021. The tailbone pain is described as tender to touch, while both hips are achy, almost constantly. L hip pain > R hip pain in the anterolateral region (C-sign). She also experiences radiating pain and burning in the buttocks and thighs. She takes Gabapentin 3x/day which has helped with the nerve pain only. She reports that NSAIDs help with hip pain but not tailbone pain.  "I feel like my hips need to be popped." She also reports anorectal burning/itching that is exacerbated by bowel movements. This pain/dysfunction has an effect on Ada's quality of life and is interfering with her daily activities."    FROM TODAY'S (1/27) VISIT: History of current condition - Ada " "reports: Ada is a return patient of mine. Care interrupted due to insurance coverage. Her tailbone pain has been stable, not as severe as prior. B SI pain has begun. Tailbone 3-4/10, SI pain 5-7/10. Has to take ibuprofen to sleep at night. Her hip popped out of place in December, felt unable to stand or move for a few minutes due to pain. Notices a "snagging" sensation on the left side. Cannot lay on L side, causes a lot of nerve pain in the rectum. Denies bladder or bowel leakage. Endorses pushing/straining to urinate. Also notes that after a recent orgasm, she experienced two "lightning shocks" in the vagina. They were short lived but very intense. Her sister recently received a hypermobile EDS diagnosis. Ada feels she may have it, too, and is waiting to see an appropriate provider. Has used the pelvic wand a few times with some relief.    OB/GYN History:  pelvic pain  Sexually active? Yes  Pain with vaginal exams, intercourse or tampon use? 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  Prior Level of Function: no pain  Current Level of Function: tailbone, B SI pain, hip pain    Types of fluid intake: water  Diet: traditional   Habitus:well developed, well nourished  Abuse/Neglect: No. Feels safe and secure in current romantic relationship    PAIN:  Location: tailbone, B SI joints   Current 3/10, worst 7/10, best 0/10   Description: Variable  Aggravating Factors/Activities that cause symptoms: Prolonged standing and Prolonged sitting   Easing Factors: pain medication     Medical History: Ada  has a past medical history of Allergy.     Surgical History:  Ada Ramirez  has " a past surgical history that includes Tonsillectomy and Adenoidectomy.    Medications: Ada has a current medication list which includes the following prescription(s): buspirone, clindamycin, fexofenadine, gabapentin, gabapentin, lidocaine, valacyclovir, and venlafaxine.    Allergies:   Review of patient's allergies indicates:   Allergen Reactions    Cephalosporins Rash    Penicillins Rash    Sulfa (sulfonamide antibiotics) Rash    Corn containing products Diarrhea and Nausea And Vomiting    Eggs [egg derived] Nausea Only    Latex, natural rubber Dermatitis and Rash        Imaging See EMR for full imaging workup    Pts goals: reduce pain and optimize function    OBJECTIVE     See EMR under MEDIA for written consent provided at prior visit   Chaperone: declined    ORTHO SCREEN  Posture in sitting: slouched   Posture in standing: forward head, forward and rounded shoulders  and posterior pelvic tilt   Pelvic alignment: Not assessed today  - will assess at next visit  SI Joint Palpation: not assessed - will assess at next visit    VAGINAL PELVIC FLOOR EXAM       Deferred - pt on menstrual cycle and would like to wait. Will be performed as necessary.    TREATMENT     Treatment Time In: 10:30  Treatment Time Out: 11:05  Total Treatment time (time-based codes) separate from Evaluation: 35 minutes    Manual Therapy to develop extensibility and reduce muscle spasm for 15 minutes including: Dry Needling of coccygeus, piriformis, glute med bilateral    Therapeutic Activity Patient participated in dynamic functional therapeutic activities to improve functional performance for 20 minutes. Including: Education as described below.     Patient Education provided:   general anatomy/physiology of urinary/ bowel  system, benefits of treatment, risks of treatment and alternative methods of treatment were discussed with the pt. Additionally, anatomy/physiology of pelvic floor were reviewed.     Home Exercises Provided:  Written  Home Exercises Provided: yes.  Exercises were reviewed and Ada was able to demonstrate them prior to the end of the session.    Ada demonstrated good  understanding of the education provided.     See EMR under Patient Instructions for exercises provided 1/27/2022.    Assessment     Ada is a 31 y.o. female referred to outpatient Physical Therapy with a medical diagnosis of Coccydynia [M53.3], Pelvic pain in female [R10.2], High-tone pelvic floor dysfunction [N94.89]. Pt presents with pelvic floor tenderness, dysfunctional voiding and B SI pain, coccydynia, B hip pain. She is likely still limited by muscle tension, poor postural awareness, muscular and joint imbalances. Primary rehab goal is to reduce pain and spasm, initiate appropriate strength/coordination training as indicated to alleviate current imbalances.    Pt prognosis is Good.   Pt will benefit from skilled outpatient Physical Therapy to address the deficits stated above and in the chart below, provide pt/family education, and to maximize pt's level of independence.     Plan of care discussed with patient: Yes  Pt's spiritual, cultural and educational needs considered and patient is agreeable to the plan of care and goals as stated below:       Anticipated Barriers for therapy: none    Medical Necessity is demonstrated by the following  History  Co-morbidities and personal factors that may impact the plan of care Co-morbidities:   consumption of bladder irritants, anxiety, chronic pain     Personal Factors:   no deficits       moderate   Examination  Body Structures and Functions, activity limitations and participation restrictions that may impact the plan of care Body Regions/Systems/Functions:  altered posture, pelvic floor tenderness, decreased pelvic muscle strength, decreased endurance of the pelvic muscles, increased tension of the pelvic muscles, poor quality of pelvic muscle contraction, increased frequency of urination, increased nocturia, poor  fluid intake and dysfunctional voiding      Activity Limitations:  urgency , pain with BM , full bladder emptying and Pain with ADLs     Participation Restrictions:  social activities with friends/family, Sleep restrictions and exercise restrictions due to pain      Activity limitations:   Learning and applying knowledge  no deficits     General Tasks and Commands  no deficits     Communication  no deficits     Mobility  no deficits     Self care  no deficits     Domestic Life  no deficits     Interactions/Relationships  no deficits     Life Areas  no deficits     Community and Social Life  no deficits          high   Clinical Presentation evolving clinical presentation with changing clinical characteristics moderate   Decision Making/ Complexity Score: moderate     Goals:  Short Term Goals: 4 weeks   Pt to demonstrate proper diaphragmatic breathing to help with calming the nervous system in order to decrease pain and to improve abdominal wall musculature extensibility.   Pt to report being able to complete a half day at work without significant increase in pain to demonstrate a return towards prior level of function.  Pt to report a decrease in pain to no more than 6/10 at it's worst with palpation to coccyx.    Pt will report minimal to no pain with single digit pelvic assessment and display relaxation during this assessment in order to progress to dilator/wand use.  Pt to report a decrease in urinary frequency from every 60 minutes to no more than once every 2 hours to improve ability to participate in social activities.     Long Term Goals: 8 weeks   Pt to be discharged with home plan for carry over after discharge.   Pt will be trained and compliant with postural strategies in sitting and standing to improve alignment and decrease pain and muscle fatigue  Pt to report being able to complete a full day at work without significant increase in pain to demonstrate a return towards prior level of function.  Pt to  report improved restorative sleeping, waking up no more than 1x/night from pain.  Pt to report being able to have a comfortable vaginal exam without significant increase in pelvic pain/discomfort.  Pt to report a decrease in pain to no more than 3/10 at it's worst with direct coccyx palpation.    Pt will tolerate a medium sized dilator and/or pelvic wand in order to improve access to women's healthcare and allow for more comfortable manual treatment.    Plan     Plan of care Certification: 1/27/2022 to 5/18/22.    Outpatient Physical Therapy 1 times weekly for 12 weeks to include the following interventions: therapeutic exercises, therapeutic activity, neuromuscular re-education, manual therapy, modalities PRN, patient/family education, dry needling, and self care/home management    I appreciate your consult and look forward to participating in this patient's care.    Daria Montano, PT, DPT

## 2022-01-28 NOTE — PATIENT INSTRUCTIONS
Patient Education       Wound Care   Why is this procedure done?   A wound is an injury to the skin that breaks the barrier to the outside. The skin protects the inside of the body from the outside world. When the skin is damaged or broken open, the wound can become infected or bleed.  Cuts and scrapes are one type of wound. Scrapes on the surface leave deeper skin layers in place. These are often caused by friction or rubbing against a rough surface.  Another kind is a puncture wound. This comes from something like a bite or stepping on a nail. Puncture wounds are caused by a pointed or sharp object, such as a nail, needle, or tooth entering the skin. Bleeding can be very little, and the wound may be barely obvious. Bites from a human or an animal always have germs in them and need extra care.  A laceration is a cut on your skin. It is most often caused by a sharp object like a knife blade, glass, or from other things with sharp edges. If the cut is shallow, it does not need stitches.     What happens before the procedure?   Different kinds of wounds may need different types of care. This is based on how they happened and how bad they are. Your doctor will look at your wound and decide how to treat it. Some wounds are closed with strips of tape, special skin glue, stitches, or staples. Some wounds need a surgeon to do the repair. Other wounds just need to be cleaned and covered with a bandage.  · Your doctor will ask you about your health history. Talk to your doctor about:  ? All the drugs you are taking. Be sure to include all prescription and over-the-counter (OTC) drugs, and herbal supplements. Tell the doctor about any drug allergy. Bring a list of drugs you take with you.  ? Any bleeding problems. Be sure to tell your doctor if you are taking any drugs that may cause bleeding. Some of these are warfarin, rivaroxaban, apixaban, ticagrelor, clopidogrel, ketorolac, ibuprofen, naproxen, or aspirin. Certain  vitamins and herbs, such as garlic and fish oil, may also add to the risk for bleeding. You may need to stop these drugs as well. Talk to your doctor about them.  ? If you will need someone to drive you home  What happens during the procedure?   · Your wound is cleaned using a special soap.  · The doctor may give you a drug to numb the area.  · Abrasions   ? Most often, abrasions only need cleaning and a bandage.  ? The doctor may put a thin layer of antibiotic ointment on your wound and cover it with a bandage.  · Puncture Wounds   ? These wounds may or may not need to have stitches. This will depend on what has caused the wound, how bad the puncture is, and where it is located on the body.  ? The doctor may order drugs to prevent infection. This is more common with animal bites.  · Lacerations   ? These wounds may or may not need to have stitches. This will depend on what has caused the cut, how bad it is, and where it is located on the body.  ? After the cut is clean, the doctor may use special strips of tape called steri-strips to hold the edges together while the cut heals.  · The time the procedure will take depends on the size of the wound and how much cleaning is needed.  What happens after the procedure?   Most often, you will be able to go home after the procedure.  What drugs may be needed?   The doctor may order drugs to:  · Help with pain  · Prevent infection  You may need to have a tetanus shot.   What problems could happen?   · Bleeding  · Infection  · Scarring  · Poor healing  Where can I learn more?   American Academy of Pediatrics  https://www.healthychildren.org/English/health-issues/injuries-emergencies/Pages/Treating-Cuts.aspx   [employer]  https://www.betterhealth.[place].gov.[place]/health/conditionsandtreatments/skin-cuts-and-abrasions   Kids [employer]  http://kidshealth.org/en/teens/wounds.html?ref=search   Last Reviewed Date   2021-06-22  Consumer Information Use and Disclaimer   This  information is not specific medical advice and does not replace information you receive from your health care provider. This is only a brief summary of general information. It does NOT include all information about conditions, illnesses, injuries, tests, procedures, treatments, therapies, discharge instructions or life-style choices that may apply to you. You must talk with your health care provider for complete information about your health and treatment options. This information should not be used to decide whether or not to accept your health care providers advice, instructions or recommendations. Only your health care provider has the knowledge and training to provide advice that is right for you.  Copyright   Copyright © 2021 [employer] and its affiliates and/or licensors. All rights reserved.

## 2022-02-03 ENCOUNTER — CLINICAL SUPPORT (OUTPATIENT)
Dept: REHABILITATION | Facility: HOSPITAL | Age: 32
End: 2022-02-03
Attending: HOSPITALIST
Payer: MEDICAID

## 2022-02-03 DIAGNOSIS — M25.559 HIP PAIN: ICD-10-CM

## 2022-02-03 DIAGNOSIS — M62.89 HIGH-TONE PELVIC FLOOR DYSFUNCTION: ICD-10-CM

## 2022-02-03 DIAGNOSIS — M53.3 COCCYDYNIA: ICD-10-CM

## 2022-02-03 DIAGNOSIS — R10.2 PELVIC PAIN IN FEMALE: ICD-10-CM

## 2022-02-03 PROCEDURE — 97140 MANUAL THERAPY 1/> REGIONS: CPT | Mod: PO

## 2022-02-03 NOTE — PROGRESS NOTES
"  Pelvic Health Physical Therapy   Treatment Note     Name: Ada Ramirez  Clinic Number: [id number]    Therapy Diagnosis:   Encounter Diagnoses   Name Primary?    High-tone pelvic floor dysfunction     Pelvic pain in female     Hip pain     Coccydynia      Physician: Molly Cunningham MD    Visit Date: 2/3/2022    Physician Orders: Pelvic PT Eval and Treat  Medical Diagnosis from Referral: Coccydynia [M53.3], Pelvic pain in female [R10.2], High-tone pelvic floor dysfunction [N94.89]  Evaluation Date: 1/27/2022  Authorization Period Expiration: 12/13/22  Plan of Care Expiration: 5/18/22  Visit # / Visits authorized: 1/ 8  Cancelled Visits: 0  No Show Visits: 0    Time In: 8:00a  Time Out: 9:05  Total Billable Time: 65 minutes    Precautions: Standard    Subjective     Pt reports: She is worried that her cervix may be "low". Her hip pain is not too bad right now, roughly a 2/10. Tailbone at a 3-4/10, nerve pain still comes and goes. Still taking gabapentin every 12 hours or so.   She was compliant with home exercise program.  Response to previous treatment: improvement in pain  Functional change: ongoing    Pain: 4/10  Location: tailbone    Constitutional Symptoms Review: The patient denies having any constitutional symptoms.     Objective   Pt verbally consents to treatment today.  Signed consent form already on file.     Ada received the following manual therapy techniques: to develop muscle spasm and pain relief for 65 minutes including:  - Long axis hip traction 2' each  - MFR to B hip adductors   - MFR to B psoas  - dry needling to B coccygeus, E-stim applied x 5 min 2Hz to L coccygeus, pistoning applied to R coccygeus. pistoning to B adductors    - leg length assessed - hips level and leg length symmetrical    Home Exercises Provided and Patient Education Provided     Education provided:   - dry needling  Discussed progression of plan of care with patient; educated pt in activity modification; reviewed HEP " with pt. Pt demonstrated and verbalized understanding of all instruction and was not provided with a handout of HEP (see Patient Instructions).      Written Home Exercises Provided: Patient instructed to cont prior HEP. (use pelvic wand at home)  Exercises were reviewed and Ada was able to demonstrate them prior to the end of the session.  Ada demonstrated good  understanding of the education provided.     See EMR under Patient Instructions for exercises provided prior visit.    Assessment     Ada tolerated treatment well and demonstrated understanding of all education provided at today's visit. Good rhythmic contractions noted with e-stim dry needling. She is showing continual progress in pain relief and return to function.    Ada Is progressing well towards her goals.   Pt prognosis is Good.     Pt will continue to benefit from skilled outpatient physical therapy to address the deficits listed in the problem list box on initial evaluation, provide pt/family education and to maximize pt's level of independence in the home and community environment.     Pt's spiritual, cultural and educational needs considered and pt agreeable to plan of care and goals.     Anticipated barriers to physical therapy: none    Goals:  Short Term Goals: 4 weeks   Pt to demonstrate proper diaphragmatic breathing to help with calming the nervous system in order to decrease pain and to improve abdominal wall musculature extensibility.   Pt to report being able to complete a half day at work without significant increase in pain to demonstrate a return towards prior level of function.  Pt to report a decrease in pain to no more than 6/10 at it's worst with palpation to coccyx.    Pt will report minimal to no pain with single digit pelvic assessment and display relaxation during this assessment in order to progress to dilator/wand use.  Pt to report a decrease in urinary frequency from every 60 minutes to no more than once every 2 hours to  improve ability to participate in social activities.     Long Term Goals: 8 weeks   Pt to be discharged with home plan for carry over after discharge.   Pt will be trained and compliant with postural strategies in sitting and standing to improve alignment and decrease pain and muscle fatigue  Pt to report being able to complete a full day at work without significant increase in pain to demonstrate a return towards prior level of function.  Pt to report improved restorative sleeping, waking up no more than 1x/night from pain.  Pt to report being able to have a comfortable vaginal exam without significant increase in pelvic pain/discomfort.  Pt to report a decrease in pain to no more than 3/10 at it's worst with direct coccyx palpation.    Pt will tolerate a medium sized dilator and/or pelvic wand in order to improve access to women's healthcare and allow for more comfortable manual treatment.    Plan     Continue per established POC.     Daria Montano, PT , DPT

## 2022-02-14 ENCOUNTER — PATIENT MESSAGE (OUTPATIENT)
Dept: RESEARCH | Facility: HOSPITAL | Age: 32
End: 2022-02-14
Payer: MEDICARE

## 2022-02-14 ENCOUNTER — CLINICAL SUPPORT (OUTPATIENT)
Dept: REHABILITATION | Facility: HOSPITAL | Age: 32
End: 2022-02-14
Attending: HOSPITALIST
Payer: MEDICARE

## 2022-02-14 DIAGNOSIS — R10.2 PELVIC PAIN IN FEMALE: ICD-10-CM

## 2022-02-14 DIAGNOSIS — M25.559 HIP PAIN: ICD-10-CM

## 2022-02-14 DIAGNOSIS — M53.3 COCCYDYNIA: ICD-10-CM

## 2022-02-14 DIAGNOSIS — M62.89 HIGH-TONE PELVIC FLOOR DYSFUNCTION: ICD-10-CM

## 2022-02-14 PROCEDURE — 97140 MANUAL THERAPY 1/> REGIONS: CPT | Mod: PO

## 2022-02-14 NOTE — PROGRESS NOTES
Pelvic Health Physical Therapy   Treatment/Progress Note     Name: Ada Ramirez  Clinic Number: [id number]    Therapy Diagnosis:   Encounter Diagnoses   Name Primary?    High-tone pelvic floor dysfunction     Pelvic pain in female     Hip pain     Coccydynia      Physician: Molly Cunningham MD    Visit Date: 2/14/2022    Physician Orders: Pelvic PT Eval and Treat  Medical Diagnosis from Referral: Coccydynia [M53.3], Pelvic pain in female [R10.2], High-tone pelvic floor dysfunction [N94.89]  Evaluation Date: 1/27/2022  Authorization Period Expiration: 12/13/22  Plan of Care Expiration: 5/18/22  Visit # / Visits authorized: 2/ 8  Cancelled Visits: 0  No Show Visits: 0    Time In: 2:30p  Time Out: 3:30p  Total Billable Time: 60 minutes    Precautions: Standard    Subjective     Pt reports: Her tailbone feels okay today, but she is having pain near the ischial tuberosity on both sides. She has not had any post-needling pain! She attempted to use the pelvic wand rectally but it was too painful. She ordered a new one to use vaginally again. Otherwise, no new complaints. She would like to try needling to the OI.  She was compliant with home exercise program.  Response to previous treatment: improvement in pain  Functional change: ongoing    Pain: not rated  Location: B ischial tuberosity    Constitutional Symptoms Review: The patient denies having any constitutional symptoms.     Objective   Pt verbally consents to treatment today.  Signed consent form already on file.     Ada received the following manual therapy techniques: to develop muscle spasm and pain relief for 60 minutes including:  -MFR to B coccygeus  - dry needling to B coccygeus and levator ani with winding/pistoning, E-stim applied x 5 min each 2Hz to B OI medial to ischial tuberosity.     Home Exercises Provided and Patient Education Provided     Education provided:   - dry needling  Discussed progression of plan of care with patient; educated pt in  activity modification; reviewed HEP with pt. Pt demonstrated and verbalized understanding of all instruction and was not provided with a handout of HEP (see Patient Instructions).      Written Home Exercises Provided: Patient instructed to cont prior HEP. (use pelvic wand at home)  Exercises were reviewed and Ada was able to demonstrate them prior to the end of the session.  Ada demonstrated good  understanding of the education provided.     See EMR under Patient Instructions for exercises provided prior visit.    Assessment     Ada tolerated treatment well and demonstrated understanding of all education provided at today's visit. Good rhythmic contractions noted with e-stim dry needling. She is showing continual progress in pain relief and return to function. Progress note done today.    Ada Is progressing well towards her goals.   Pt prognosis is Good.     Pt will continue to benefit from skilled outpatient physical therapy to address the deficits listed in the problem list box on initial evaluation, provide pt/family education and to maximize pt's level of independence in the home and community environment.     Pt's spiritual, cultural and educational needs considered and pt agreeable to plan of care and goals.     Anticipated barriers to physical therapy: none    Goals:  Short Term Goals: 4 weeks   Pt to demonstrate proper diaphragmatic breathing to help with calming the nervous system in order to decrease pain and to improve abdominal wall musculature extensibility. MET 2/14  Pt to report being able to complete a half day at work without significant increase in pain to demonstrate a return towards prior level of function. PROGRESSING 2/14  Pt to report a decrease in pain to no more than 6/10 at it's worst with palpation to coccyx.  PROGRESSING 2/14  Pt will report minimal to no pain with single digit pelvic assessment and display relaxation during this assessment in order to progress to dilator/wand use.  PROGRESSING 2/14  Pt to report a decrease in urinary frequency from every 60 minutes to no more than once every 2 hours to improve ability to participate in social activities. MET 2/14     Long Term Goals: 8 weeks   Pt to be discharged with home plan for carry over after discharge.   Pt will be trained and compliant with postural strategies in sitting and standing to improve alignment and decrease pain and muscle fatigue  Pt to report being able to complete a full day at work without significant increase in pain to demonstrate a return towards prior level of function.  Pt to report improved restorative sleeping, waking up no more than 1x/night from pain.  Pt to report being able to have a comfortable vaginal exam without significant increase in pelvic pain/discomfort.  Pt to report a decrease in pain to no more than 3/10 at it's worst with direct coccyx palpation.    Pt will tolerate a medium sized dilator and/or pelvic wand in order to improve access to women's healthcare and allow for more comfortable manual treatment.    Plan     Continue per established POC.     Daria Montano, PT , DPT

## 2022-02-22 ENCOUNTER — TELEPHONE (OUTPATIENT)
Dept: INTERNAL MEDICINE | Facility: CLINIC | Age: 32
End: 2022-02-22
Payer: MEDICARE

## 2022-03-03 ENCOUNTER — PATIENT MESSAGE (OUTPATIENT)
Dept: INTERNAL MEDICINE | Facility: CLINIC | Age: 32
End: 2022-03-03
Payer: MEDICARE

## 2022-03-03 ENCOUNTER — TELEPHONE (OUTPATIENT)
Dept: INTERNAL MEDICINE | Facility: CLINIC | Age: 32
End: 2022-03-03
Payer: MEDICARE

## 2022-03-03 DIAGNOSIS — Z00.00 ANNUAL PHYSICAL EXAM: Primary | ICD-10-CM

## 2022-03-03 DIAGNOSIS — Z00.00 ANNUAL PHYSICAL EXAM: ICD-10-CM

## 2022-03-03 DIAGNOSIS — Z11.3 SCREEN FOR STD (SEXUALLY TRANSMITTED DISEASE): Primary | ICD-10-CM

## 2022-03-03 NOTE — TELEPHONE ENCOUNTER
----- Message from Diaz Angulo sent at 3/3/2022  2:31 PM CST -----  Contact: pt  Pt is scheduled for her labs on 3/15 and she would like to get the full annual blood workup for this lab visit.Please advise

## 2022-03-15 ENCOUNTER — LAB VISIT (OUTPATIENT)
Dept: LAB | Facility: HOSPITAL | Age: 32
End: 2022-03-15
Attending: HOSPITALIST
Payer: MEDICAID

## 2022-03-15 DIAGNOSIS — Z11.3 SCREEN FOR STD (SEXUALLY TRANSMITTED DISEASE): ICD-10-CM

## 2022-03-15 DIAGNOSIS — Z00.00 ANNUAL PHYSICAL EXAM: ICD-10-CM

## 2022-03-15 LAB
ALBUMIN SERPL BCP-MCNC: 4.1 G/DL (ref 3.5–5.2)
ALP SERPL-CCNC: 71 U/L (ref 55–135)
ALT SERPL W/O P-5'-P-CCNC: 14 U/L (ref 10–44)
ANION GAP SERPL CALC-SCNC: 10 MMOL/L (ref 8–16)
AST SERPL-CCNC: 16 U/L (ref 10–40)
BASOPHILS # BLD AUTO: 0 K/UL (ref 0–0.2)
BASOPHILS NFR BLD: 0 % (ref 0–1.9)
BILIRUB SERPL-MCNC: 0.6 MG/DL (ref 0.1–1)
BUN SERPL-MCNC: 9 MG/DL (ref 6–20)
CALCIUM SERPL-MCNC: 9.2 MG/DL (ref 8.7–10.5)
CHLORIDE SERPL-SCNC: 107 MMOL/L (ref 95–110)
CHOLEST SERPL-MCNC: 168 MG/DL (ref 120–199)
CHOLEST/HDLC SERPL: 2.3 {RATIO} (ref 2–5)
CO2 SERPL-SCNC: 23 MMOL/L (ref 23–29)
CREAT SERPL-MCNC: 0.7 MG/DL (ref 0.5–1.4)
DIFFERENTIAL METHOD: ABNORMAL
EOSINOPHIL # BLD AUTO: 0 K/UL (ref 0–0.5)
EOSINOPHIL NFR BLD: 0 % (ref 0–8)
ERYTHROCYTE [DISTWIDTH] IN BLOOD BY AUTOMATED COUNT: 12.8 % (ref 11.5–14.5)
EST. GFR  (AFRICAN AMERICAN): >60 ML/MIN/1.73 M^2
EST. GFR  (NON AFRICAN AMERICAN): >60 ML/MIN/1.73 M^2
GLUCOSE SERPL-MCNC: 89 MG/DL (ref 70–110)
HCT VFR BLD AUTO: 38.7 % (ref 37–48.5)
HDLC SERPL-MCNC: 74 MG/DL (ref 40–75)
HDLC SERPL: 44 % (ref 20–50)
HGB BLD-MCNC: 12.6 G/DL (ref 12–16)
IMM GRANULOCYTES # BLD AUTO: 0.01 K/UL (ref 0–0.04)
IMM GRANULOCYTES NFR BLD AUTO: 0.2 % (ref 0–0.5)
LDLC SERPL CALC-MCNC: 80.2 MG/DL (ref 63–159)
LYMPHOCYTES # BLD AUTO: 1.2 K/UL (ref 1–4.8)
LYMPHOCYTES NFR BLD: 28.7 % (ref 18–48)
MCH RBC QN AUTO: 31.2 PG (ref 27–31)
MCHC RBC AUTO-ENTMCNC: 32.6 G/DL (ref 32–36)
MCV RBC AUTO: 96 FL (ref 82–98)
MONOCYTES # BLD AUTO: 0.6 K/UL (ref 0.3–1)
MONOCYTES NFR BLD: 13.2 % (ref 4–15)
NEUTROPHILS # BLD AUTO: 2.4 K/UL (ref 1.8–7.7)
NEUTROPHILS NFR BLD: 57.9 % (ref 38–73)
NONHDLC SERPL-MCNC: 94 MG/DL
NRBC BLD-RTO: 0 /100 WBC
PLATELET # BLD AUTO: 283 K/UL (ref 150–450)
PMV BLD AUTO: 11.1 FL (ref 9.2–12.9)
POTASSIUM SERPL-SCNC: 3.8 MMOL/L (ref 3.5–5.1)
PROT SERPL-MCNC: 6.9 G/DL (ref 6–8.4)
RBC # BLD AUTO: 4.04 M/UL (ref 4–5.4)
SODIUM SERPL-SCNC: 140 MMOL/L (ref 136–145)
TRIGL SERPL-MCNC: 69 MG/DL (ref 30–150)
TSH SERPL DL<=0.005 MIU/L-ACNC: 1.91 UIU/ML (ref 0.4–4)
WBC # BLD AUTO: 4.18 K/UL (ref 3.9–12.7)

## 2022-03-15 PROCEDURE — 86592 SYPHILIS TEST NON-TREP QUAL: CPT | Performed by: HOSPITALIST

## 2022-03-15 PROCEDURE — 84443 ASSAY THYROID STIM HORMONE: CPT | Performed by: HOSPITALIST

## 2022-03-15 PROCEDURE — 80061 LIPID PANEL: CPT | Performed by: HOSPITALIST

## 2022-03-15 PROCEDURE — 80053 COMPREHEN METABOLIC PANEL: CPT | Performed by: HOSPITALIST

## 2022-03-15 PROCEDURE — 86695 HERPES SIMPLEX TYPE 1 TEST: CPT | Performed by: HOSPITALIST

## 2022-03-15 PROCEDURE — 87389 HIV-1 AG W/HIV-1&-2 AB AG IA: CPT | Performed by: HOSPITALIST

## 2022-03-15 PROCEDURE — 86696 HERPES SIMPLEX TYPE 2 TEST: CPT | Performed by: HOSPITALIST

## 2022-03-15 PROCEDURE — 86694 HERPES SIMPLEX NES ANTBDY: CPT | Performed by: HOSPITALIST

## 2022-03-15 PROCEDURE — 36415 COLL VENOUS BLD VENIPUNCTURE: CPT | Mod: PO | Performed by: HOSPITALIST

## 2022-03-15 PROCEDURE — 80074 ACUTE HEPATITIS PANEL: CPT | Performed by: HOSPITALIST

## 2022-03-15 PROCEDURE — 85025 COMPLETE CBC W/AUTO DIFF WBC: CPT | Performed by: HOSPITALIST

## 2022-03-16 ENCOUNTER — CLINICAL SUPPORT (OUTPATIENT)
Dept: REHABILITATION | Facility: HOSPITAL | Age: 32
End: 2022-03-16
Attending: HOSPITALIST
Payer: MEDICARE

## 2022-03-16 DIAGNOSIS — M62.89 HIGH-TONE PELVIC FLOOR DYSFUNCTION: ICD-10-CM

## 2022-03-16 DIAGNOSIS — M53.3 COCCYDYNIA: ICD-10-CM

## 2022-03-16 DIAGNOSIS — M25.559 HIP PAIN: ICD-10-CM

## 2022-03-16 DIAGNOSIS — R10.2 PELVIC PAIN IN FEMALE: ICD-10-CM

## 2022-03-16 PROCEDURE — 97530 THERAPEUTIC ACTIVITIES: CPT | Mod: PO

## 2022-03-16 PROCEDURE — 97140 MANUAL THERAPY 1/> REGIONS: CPT | Mod: PO

## 2022-03-16 NOTE — PROGRESS NOTES
Pelvic Health Physical Therapy   Treatment/Progress Note     Name: Ada Ramirez  Clinic Number: [id number]    Therapy Diagnosis:   Encounter Diagnoses   Name Primary?    Hip pain     Coccydynia     High-tone pelvic floor dysfunction     Pelvic pain in female      Physician: Molly Cunningham MD    Visit Date: 3/16/2022    Physician Orders: Pelvic PT Eval and Treat  Medical Diagnosis from Referral: Coccydynia [M53.3], Pelvic pain in female [R10.2], High-tone pelvic floor dysfunction [N94.89]  Evaluation Date: 1/27/2022  Authorization Period Expiration: 5/322  Plan of Care Expiration: 5/18/22  Visit # / Visits authorized: 3/ 8  Cancelled Visits: 0  No Show Visits: 0    Time In: 1:00p  Time Out: 2:00p  Total Billable Time: 60 minutes    Precautions: Standard    Subjective     Pt reports: She is doing well. She feels like she has PMS. She got a tens unit. Started using on anterolateral hip and made her numb. Using it on nner thighs and thoracic spine felt really good. No pain in tailbone lately! R hip has been popping, though.    She was compliant with home exercise program.  Response to previous treatment: improvement in pain  Functional change: ongoing    Pain: mild  Location: R hip    Constitutional Symptoms Review: The patient denies having any constitutional symptoms.     Objective   Pt verbally consents to treatment today.  Signed consent form already on file.     Ada received the following manual therapy techniques: to develop muscle spasm and pain relief for 50 minutes including:  -MFR to B coccygeus  - dry needling to R glute med and hip adductors with winding/pistoning, needling with E-stim applied x 6 min 2Hz, intensity as tolerated, to R OI medial to ischial tuberosity.     Ada participated in dynamic functional therapeutic activities to improve functional performance for 10  minutes, including: anatomy review of pudendal nerve and pelvic floor/hip mm    Home Exercises Provided and Patient Education  Provided     Education provided:   - dry needling  Discussed progression of plan of care with patient; educated pt in activity modification; reviewed HEP with pt. Pt demonstrated and verbalized understanding of all instruction and was not provided with a handout of HEP (see Patient Instructions).    Written Home Exercises Provided: Patient instructed to cont prior HEP. (use pelvic wand at home)  Exercises were reviewed and Ada was able to demonstrate them prior to the end of the session.  Ada demonstrated good  understanding of the education provided.     See EMR under Patient Instructions for exercises provided prior visit.    Assessment     Ada tolerated treatment well and demonstrated understanding of all education provided at today's visit. Good rhythmic contractions noted with e-stim dry needling. She is showing continual progress in pain relief and return to function. She reported some rectal itching during the last minute of dry needling with stim to R OI.     Ada Is progressing well towards her goals.   Pt prognosis is Good.     Pt will continue to benefit from skilled outpatient physical therapy to address the deficits listed in the problem list box on initial evaluation, provide pt/family education and to maximize pt's level of independence in the home and community environment.     Pt's spiritual, cultural and educational needs considered and pt agreeable to plan of care and goals.     Anticipated barriers to physical therapy: none    Goals:  Short Term Goals: 4 weeks   Pt to demonstrate proper diaphragmatic breathing to help with calming the nervous system in order to decrease pain and to improve abdominal wall musculature extensibility. MET 2/14  Pt to report being able to complete a half day at work without significant increase in pain to demonstrate a return towards prior level of function. PROGRESSING 2/14  Pt to report a decrease in pain to no more than 6/10 at it's worst with palpation to  coccyx.  PROGRESSING 2/14  Pt will report minimal to no pain with single digit pelvic assessment and display relaxation during this assessment in order to progress to dilator/wand use. PROGRESSING 2/14  Pt to report a decrease in urinary frequency from every 60 minutes to no more than once every 2 hours to improve ability to participate in social activities. MET 2/14     Long Term Goals: 8 weeks   Pt to be discharged with home plan for carry over after discharge.   Pt will be trained and compliant with postural strategies in sitting and standing to improve alignment and decrease pain and muscle fatigue  Pt to report being able to complete a full day at work without significant increase in pain to demonstrate a return towards prior level of function.  Pt to report improved restorative sleeping, waking up no more than 1x/night from pain.  Pt to report being able to have a comfortable vaginal exam without significant increase in pelvic pain/discomfort.  Pt to report a decrease in pain to no more than 3/10 at it's worst with direct coccyx palpation.    Pt will tolerate a medium sized dilator and/or pelvic wand in order to improve access to women's healthcare and allow for more comfortable manual treatment.    Plan     Continue per established POC.     Daria Montano, PT , DPT

## 2022-03-17 LAB — HSV AB, IGM BY EIA: 0.52 INDEX

## 2022-03-18 LAB
HSV1 IGG SERPL QL IA: POSITIVE
HSV2 IGG SERPL QL IA: NEGATIVE

## 2022-03-22 ENCOUNTER — OFFICE VISIT (OUTPATIENT)
Dept: INTERNAL MEDICINE | Facility: CLINIC | Age: 32
End: 2022-03-22
Payer: MEDICAID

## 2022-03-22 ENCOUNTER — PATIENT MESSAGE (OUTPATIENT)
Dept: INTERNAL MEDICINE | Facility: CLINIC | Age: 32
End: 2022-03-22

## 2022-03-22 VITALS
WEIGHT: 140.44 LBS | DIASTOLIC BLOOD PRESSURE: 76 MMHG | HEART RATE: 92 BPM | BODY MASS INDEX: 23.98 KG/M2 | HEIGHT: 64 IN | SYSTOLIC BLOOD PRESSURE: 120 MMHG | TEMPERATURE: 97 F | RESPIRATION RATE: 16 BRPM | OXYGEN SATURATION: 98 %

## 2022-03-22 DIAGNOSIS — M53.3 COCCYDYNIA: ICD-10-CM

## 2022-03-22 DIAGNOSIS — F41.1 GAD (GENERALIZED ANXIETY DISORDER): ICD-10-CM

## 2022-03-22 DIAGNOSIS — M24.9 HYPERMOBILE JOINTS: Primary | ICD-10-CM

## 2022-03-22 DIAGNOSIS — Z00.00 ANNUAL PHYSICAL EXAM: Primary | ICD-10-CM

## 2022-03-22 PROCEDURE — 3078F PR MOST RECENT DIASTOLIC BLOOD PRESSURE < 80 MM HG: ICD-10-PCS | Mod: CPTII,,, | Performed by: HOSPITALIST

## 2022-03-22 PROCEDURE — 99395 PR PREVENTIVE VISIT,EST,18-39: ICD-10-PCS | Mod: S$PBB,,, | Performed by: HOSPITALIST

## 2022-03-22 PROCEDURE — 1159F PR MEDICATION LIST DOCUMENTED IN MEDICAL RECORD: ICD-10-PCS | Mod: CPTII,,, | Performed by: HOSPITALIST

## 2022-03-22 PROCEDURE — 3008F BODY MASS INDEX DOCD: CPT | Mod: CPTII,,, | Performed by: HOSPITALIST

## 2022-03-22 PROCEDURE — 1159F MED LIST DOCD IN RCRD: CPT | Mod: CPTII,,, | Performed by: HOSPITALIST

## 2022-03-22 PROCEDURE — 99395 PREV VISIT EST AGE 18-39: CPT | Mod: S$PBB,,, | Performed by: HOSPITALIST

## 2022-03-22 PROCEDURE — 99999 PR PBB SHADOW E&M-EST. PATIENT-LVL IV: ICD-10-PCS | Mod: PBBFAC,,, | Performed by: HOSPITALIST

## 2022-03-22 PROCEDURE — 3074F PR MOST RECENT SYSTOLIC BLOOD PRESSURE < 130 MM HG: ICD-10-PCS | Mod: CPTII,,, | Performed by: HOSPITALIST

## 2022-03-22 PROCEDURE — 1160F RVW MEDS BY RX/DR IN RCRD: CPT | Mod: CPTII,,, | Performed by: HOSPITALIST

## 2022-03-22 PROCEDURE — 3074F SYST BP LT 130 MM HG: CPT | Mod: CPTII,,, | Performed by: HOSPITALIST

## 2022-03-22 PROCEDURE — 1160F PR REVIEW ALL MEDS BY PRESCRIBER/CLIN PHARMACIST DOCUMENTED: ICD-10-PCS | Mod: CPTII,,, | Performed by: HOSPITALIST

## 2022-03-22 PROCEDURE — 3008F PR BODY MASS INDEX (BMI) DOCUMENTED: ICD-10-PCS | Mod: CPTII,,, | Performed by: HOSPITALIST

## 2022-03-22 PROCEDURE — 99999 PR PBB SHADOW E&M-EST. PATIENT-LVL IV: CPT | Mod: PBBFAC,,, | Performed by: HOSPITALIST

## 2022-03-22 PROCEDURE — 99214 OFFICE O/P EST MOD 30 MIN: CPT | Mod: PBBFAC,PO | Performed by: HOSPITALIST

## 2022-03-22 PROCEDURE — 3078F DIAST BP <80 MM HG: CPT | Mod: CPTII,,, | Performed by: HOSPITALIST

## 2022-03-22 RX ORDER — MINERAL OIL
ENEMA (ML) RECTAL
COMMUNITY
End: 2023-04-05

## 2022-03-22 RX ORDER — ESCITALOPRAM OXALATE 5 MG/1
TABLET ORAL
COMMUNITY
End: 2022-03-22

## 2022-03-22 RX ORDER — VENLAFAXINE HYDROCHLORIDE 37.5 MG/1
37.5 CAPSULE, EXTENDED RELEASE ORAL DAILY
Qty: 90 CAPSULE | Refills: 3 | Status: SHIPPED | OUTPATIENT
Start: 2022-03-22 | End: 2023-04-06

## 2022-03-22 NOTE — PROGRESS NOTES
Subjective:     @Patient ID: Ada Ramirez is a 31 y.o. female.    Chief Complaint: Annual Exam    HPI    31 y.o. female here for annual exam.  Reports still having coccyx pain. Continues to go to pelvic floor PT.  States that she is interested in seeing a rheumatologist to diagnose possibly EDS which her sister has. Does not have laxity of skin. But does report sometimes her joints can be dislocated. Pt also reports that she was diagnosed with ADHD as a child and was started on adderall. States she has noticed lately at work having trouble focusing and completing tasks. Is interested in resuming adhd treatment. Will try to get records     Lipid disorders/ASCVD risk (ages >/= 45 or >/= 20 if increased risk ): ordered  Eye exam:     Cervical Cancer (Pap Smear ages 21-65 every 3 years or Pap + HPV q5 years after 30 years of age):  [facility]     Vaccines:   Influenza (yearly): reports done   Tetanus (every 10 yrs - 1st tdap) done Tdap 7/2019  Covid19: utd      Exercise: walking   Diet: regular     Review of Systems   Constitutional: Negative for chills and fever.   HENT: Negative for congestion and sore throat.    Eyes: Negative for pain and visual disturbance.   Respiratory: Negative for cough and shortness of breath.    Cardiovascular: Negative for chest pain and leg swelling.   Gastrointestinal: Negative for abdominal pain, nausea and vomiting.   Endocrine: Negative for polydipsia and polyuria.   Genitourinary: Negative for difficulty urinating and dysuria.   Musculoskeletal: Negative for arthralgias and back pain.        +tail bone pain   Skin: Negative for rash and wound.   Neurological: Negative for dizziness, weakness and headaches.   Psychiatric/Behavioral: Negative for agitation and confusion.     Past medical history, surgical history, and family medical history reviewed and updated as appropriate.    Medications and allergies reviewed.     Objective:     There were no vitals  filed for this visit.  There is no height or weight on file to calculate BMI.  Physical Exam  Vitals reviewed.   Constitutional:       General: She is not in acute distress.     Appearance: She is well-developed.   HENT:      Head: Normocephalic and atraumatic.      Right Ear: Tympanic membrane normal.      Left Ear: Tympanic membrane normal.      Mouth/Throat:      Mouth: Mucous membranes are moist.      Pharynx: No oropharyngeal exudate.   Eyes:      General:         Right eye: No discharge.         Left eye: No discharge.      Conjunctiva/sclera: Conjunctivae normal.   Cardiovascular:      Rate and Rhythm: Normal rate and regular rhythm.      Heart sounds: No murmur heard.    No friction rub.   Pulmonary:      Effort: Pulmonary effort is normal.      Breath sounds: Normal breath sounds.   Abdominal:      General: Bowel sounds are normal. There is no distension.      Palpations: Abdomen is soft.      Tenderness: There is no abdominal tenderness. There is no guarding.   Musculoskeletal:         General: Normal range of motion.      Cervical back: Normal range of motion and neck supple.      Right lower leg: No edema.      Left lower leg: No edema.   Lymphadenopathy:      Cervical: No cervical adenopathy.   Skin:     General: Skin is warm and dry.   Neurological:      Mental Status: She is alert and oriented to person, place, and time.   Psychiatric:         Mood and Affect: Mood normal.         Behavior: Behavior normal.         Lab Results   Component Value Date    WBC 4.18 03/15/2022    HGB 12.6 03/15/2022    HCT 38.7 03/15/2022     03/15/2022    CHOL 168 03/15/2022    TRIG 69 03/15/2022    HDL 74 03/15/2022    ALT 14 03/15/2022    AST 16 03/15/2022     03/15/2022    K 3.8 03/15/2022     03/15/2022    CREATININE 0.7 03/15/2022    BUN 9 03/15/2022    CO2 23 03/15/2022    TSH 1.907 03/15/2022    HGBA1C 4.7 07/16/2019       Assessment:     1. Annual physical exam    2. Coccydynia    3. RYAN  (generalized anxiety disorder)      Plan:   Ada was seen today for annual exam.    Diagnoses and all orders for this visit:    Annual physical exam  - reviewed lab results with pt in clinic     Coccydynia  - continue home meds prn. Pt will notify MD for referral to o/s rheumatology. Will look for specialist in her network     RYAN (generalized anxiety disorder)  - stable. Continue buspar and effexor     Other orders  -     venlafaxine (EFFEXOR-XR) 37.5 MG 24 hr capsule; Take 1 capsule (37.5 mg total) by mouth once daily.        RTC 1 year and prn     Molly Cunningham MD  Internal Medicine    3/22/2022

## 2022-03-23 ENCOUNTER — CLINICAL SUPPORT (OUTPATIENT)
Dept: REHABILITATION | Facility: HOSPITAL | Age: 32
End: 2022-03-23
Attending: HOSPITALIST
Payer: MEDICAID

## 2022-03-23 DIAGNOSIS — M53.3 COCCYDYNIA: ICD-10-CM

## 2022-03-23 DIAGNOSIS — R10.2 PELVIC PAIN IN FEMALE: ICD-10-CM

## 2022-03-23 DIAGNOSIS — M25.559 HIP PAIN: Primary | ICD-10-CM

## 2022-03-23 DIAGNOSIS — M62.89 HIGH-TONE PELVIC FLOOR DYSFUNCTION: ICD-10-CM

## 2022-03-23 PROCEDURE — 97140 MANUAL THERAPY 1/> REGIONS: CPT | Mod: PO

## 2022-03-23 NOTE — PROGRESS NOTES
Pelvic Health Physical Therapy   Treatment/Progress Note     Name: Aad Ramirez  Clinic Number: [id number]    Therapy Diagnosis:   Encounter Diagnoses   Name Primary?    Hip pain Yes    Coccydynia     High-tone pelvic floor dysfunction     Pelvic pain in female      Physician: Molly Cunningham MD    Visit Date: 3/23/2022    Physician Orders: Pelvic PT Eval and Treat  Medical Diagnosis from Referral: Coccydynia [M53.3], Pelvic pain in female [R10.2], High-tone pelvic floor dysfunction [N94.89]  Evaluation Date: 1/27/2022  Authorization Period Expiration: 5/3/22  Plan of Care Expiration: 5/18/22  Visit # / Visits authorized: 4/ 8  Cancelled Visits: 0  No Show Visits: 0    Time In: 1:00p  Time Out: 2:00p  Total Billable Time: 60 minutes    Precautions: Standard    Subjective     Pt reports: No new complaints, though she feels as if she's plateauing. She has a referral placed for rheumatology to see if they have other recommendations.  She was compliant with home exercise program.  Response to previous treatment: improvement in pain  Functional change: ongoing    Pain: mild  Location: R hip/tailbone    Constitutional Symptoms Review: The patient denies having any constitutional symptoms.     Objective   Pt verbally consents to treatment today.  Signed consent form already on file.     Ada received the following manual therapy techniques: to decrease muscle spasm and pain relief for 60 minutes including: ischiorectal fossa release and superficial STM    Home Exercises Provided and Patient Education Provided     Education provided:   - cont prior HEP, consider   Discussed progression of plan of care with patient; educated pt in activity modification; reviewed HEP with pt. Pt demonstrated and verbalized understanding of all instruction and was not provided with a handout of HEP (see Patient Instructions).    Written Home Exercises Provided: Patient instructed to cont prior HEP. (use pelvic wand at  home)  Exercises were reviewed and Ada was able to demonstrate them prior to the end of the session.  Ada demonstrated good  understanding of the education provided.     See EMR under Patient Instructions for exercises provided prior visit.    Assessment     Ada tolerated treatment well and demonstrated understanding of all education provided at today's visit. Though Ada has shown great progress, she may be seeing a plateau in pain relief at this time. She could benefit from rheumatology consult. Goals assessed today.    Ada Is progressing well towards her goals.   Pt prognosis is Good.     Pt will continue to benefit from skilled outpatient physical therapy to address the deficits listed in the problem list box on initial evaluation, provide pt/family education and to maximize pt's level of independence in the home and community environment.     Pt's spiritual, cultural and educational needs considered and pt agreeable to plan of care and goals.     Anticipated barriers to physical therapy: none    Goals:  Short Term Goals: 4 weeks   Pt to demonstrate proper diaphragmatic breathing to help with calming the nervous system in order to decrease pain and to improve abdominal wall musculature extensibility. MET 2/14  Pt to report being able to complete a half day at work without significant increase in pain to demonstrate a return towards prior level of function. PROGRESSING 3/23  Pt to report a decrease in pain to no more than 6/10 at it's worst with palpation to coccyx.  PROGRESSING 3/23  Pt will report minimal to no pain with single digit pelvic assessment and display relaxation during this assessment in order to progress to dilator/wand use. MET 3/23  Pt to report a decrease in urinary frequency from every 60 minutes to no more than once every 2 hours to improve ability to participate in social activities. MET 2/14     Long Term Goals: 8 weeks   Pt to be discharged with home plan for carry over after discharge.  PROGRESSING 3/23  Pt will be trained and compliant with postural strategies in sitting and standing to improve alignment and decrease pain and muscle fatigue MET 3/23  Pt to report being able to complete a full day at work without significant increase in pain to demonstrate a return towards prior level of function.PROGRESSING 3/23  Pt to report improved restorative sleeping, waking up no more than 1x/night from pain. MET 3/23  Pt to report being able to have a comfortable vaginal exam without significant increase in pelvic pain/discomfort. PROGRESSING 3/23  Pt to report a decrease in pain to no more than 3/10 at it's worst with direct coccyx palpation.  PROGRESSING 3/23  Pt will tolerate a medium sized dilator and/or pelvic wand in order to improve access to women's healthcare and allow for more comfortable manual treatment. PROGRESSING 3/23    Plan     Continue per established POC.     Daria Montano, PT , DPT

## 2022-03-23 NOTE — TELEPHONE ENCOUNTER
Please see Ok message regarding referral request.     who specializes in EDS diagnosis/txt and accepts Medicaid. His name is Dr. Duane Superneau, and hes located at [facility] in [place]. If you approve of this plan, they would need a a referral, patient demographics, and office notes faxed to their office at [phone]  Please advise.

## 2022-03-30 ENCOUNTER — CLINICAL SUPPORT (OUTPATIENT)
Dept: REHABILITATION | Facility: HOSPITAL | Age: 32
End: 2022-03-30
Attending: HOSPITALIST
Payer: MEDICAID

## 2022-03-30 DIAGNOSIS — M25.559 HIP PAIN: Primary | ICD-10-CM

## 2022-03-30 DIAGNOSIS — M62.89 HIGH-TONE PELVIC FLOOR DYSFUNCTION: ICD-10-CM

## 2022-03-30 DIAGNOSIS — R10.2 PELVIC PAIN IN FEMALE: ICD-10-CM

## 2022-03-30 DIAGNOSIS — M53.3 COCCYDYNIA: ICD-10-CM

## 2022-03-30 PROCEDURE — 97140 MANUAL THERAPY 1/> REGIONS: CPT | Mod: PO

## 2022-03-30 NOTE — PROGRESS NOTES
Pelvic Health Physical Therapy   Treatment Note     Name: Ada Ramirez  Clinic Number: [id number]    Therapy Diagnosis:   Encounter Diagnoses   Name Primary?    Hip pain Yes    Coccydynia     High-tone pelvic floor dysfunction     Pelvic pain in female      Physician: Molly Cunningham MD    Visit Date: 3/30/2022    Physician Orders: Pelvic PT Eval and Treat  Medical Diagnosis from Referral: Coccydynia [M53.3], Pelvic pain in female [R10.2], High-tone pelvic floor dysfunction [N94.89]  Evaluation Date: 1/27/2022  Authorization Period Expiration: 5/3/22  Plan of Care Expiration: 5/18/22  Visit # / Visits authorized: 5/ 8  Cancelled Visits: 0  No Show Visits: 0    Time In: 2:08p  Time Out: 3:05p  Total Billable Time: 57 minutes    Precautions: Standard    Subjective     Pt reports: She has been sitting a lot so she's having an uptick in pain today. Overall, though, she feels like her pain is improving.   She was compliant with home exercise program.  Response to previous treatment: improvement in pain  Functional change: ongoing    Pain: 7/10  Location: tailbone and L hip    Constitutional Symptoms Review: The patient denies having any constitutional symptoms.     Objective   Pt verbally consents to treatment today.  Signed consent form already on file.     Ada received the following manual therapy techniques: to decrease muscle spasm and pain relief for 57 minutes including: Dry needling to coccyx and levator ani, dry needling with stimulation to B coccygeus and L OI at medial ischial tuberosity. Lateral L hip mobilization with movement into flexion.    Home Exercises Provided and Patient Education Provided     Education provided:   - cont prior HEP  Discussed progression of plan of care with patient; educated pt in activity modification; reviewed HEP with pt. Pt demonstrated and verbalized understanding of all instruction and was not provided with a handout of HEP (see Patient Instructions).    Written Home  Exercises Provided: Patient instructed to cont prior HEP. (use pelvic wand at home)  Exercises were reviewed and Ada was able to demonstrate them prior to the end of the session.  Ada demonstrated good  understanding of the education provided.     See EMR under Patient Instructions for exercises provided prior visit.    Assessment     Ada tolerated treatment well and demonstrated understanding of all education provided at today's visit. Normal response to dry needling and manual therapies today and no adverse reactions noted.    Ada Is progressing well towards her goals.   Pt prognosis is Good.     Pt will continue to benefit from skilled outpatient physical therapy to address the deficits listed in the problem list box on initial evaluation, provide pt/family education and to maximize pt's level of independence in the home and community environment.     Pt's spiritual, cultural and educational needs considered and pt agreeable to plan of care and goals.     Anticipated barriers to physical therapy: none    Goals:  Short Term Goals: 4 weeks   Pt to demonstrate proper diaphragmatic breathing to help with calming the nervous system in order to decrease pain and to improve abdominal wall musculature extensibility. MET 2/14  Pt to report being able to complete a half day at work without significant increase in pain to demonstrate a return towards prior level of function. PROGRESSING 3/23  Pt to report a decrease in pain to no more than 6/10 at it's worst with palpation to coccyx.  PROGRESSING 3/23  Pt will report minimal to no pain with single digit pelvic assessment and display relaxation during this assessment in order to progress to dilator/wand use. MET 3/23  Pt to report a decrease in urinary frequency from every 60 minutes to no more than once every 2 hours to improve ability to participate in social activities. MET 2/14     Long Term Goals: 8 weeks   Pt to be discharged with home plan for carry over after  discharge. PROGRESSING 3/23  Pt will be trained and compliant with postural strategies in sitting and standing to improve alignment and decrease pain and muscle fatigue MET 3/23  Pt to report being able to complete a full day at work without significant increase in pain to demonstrate a return towards prior level of function.PROGRESSING 3/23  Pt to report improved restorative sleeping, waking up no more than 1x/night from pain. MET 3/23  Pt to report being able to have a comfortable vaginal exam without significant increase in pelvic pain/discomfort. PROGRESSING 3/23  Pt to report a decrease in pain to no more than 3/10 at it's worst with direct coccyx palpation.  PROGRESSING 3/23  Pt will tolerate a medium sized dilator and/or pelvic wand in order to improve access to women's healthcare and allow for more comfortable manual treatment. PROGRESSING 3/23    Plan     Continue per established POC.     Daria Montano, PT , DPT

## 2022-04-04 ENCOUNTER — PATIENT MESSAGE (OUTPATIENT)
Dept: INTERNAL MEDICINE | Facility: CLINIC | Age: 32
End: 2022-04-04
Payer: MEDICARE

## 2022-04-06 ENCOUNTER — CLINICAL SUPPORT (OUTPATIENT)
Dept: REHABILITATION | Facility: HOSPITAL | Age: 32
End: 2022-04-06
Attending: HOSPITALIST
Payer: MEDICARE

## 2022-04-06 DIAGNOSIS — R10.2 PELVIC PAIN IN FEMALE: ICD-10-CM

## 2022-04-06 DIAGNOSIS — M25.559 HIP PAIN: ICD-10-CM

## 2022-04-06 DIAGNOSIS — M53.3 COCCYDYNIA: ICD-10-CM

## 2022-04-06 DIAGNOSIS — M62.89 HIGH-TONE PELVIC FLOOR DYSFUNCTION: ICD-10-CM

## 2022-04-06 PROCEDURE — 97140 MANUAL THERAPY 1/> REGIONS: CPT | Mod: PO

## 2022-04-06 PROCEDURE — 97110 THERAPEUTIC EXERCISES: CPT | Mod: PO

## 2022-04-06 NOTE — PROGRESS NOTES
Pelvic Health Physical Therapy   Treatment Note     Name: Ada Ramirez  Clinic Number: [id number]    Therapy Diagnosis:   Encounter Diagnoses   Name Primary?    Hip pain     Coccydynia     High-tone pelvic floor dysfunction     Pelvic pain in female      Physician: Molly Cunningham MD    Visit Date: 4/6/2022    Physician Orders: Pelvic PT Eval and Treat  Medical Diagnosis from Referral: Coccydynia [M53.3], Pelvic pain in female [R10.2], High-tone pelvic floor dysfunction [N94.89]  Evaluation Date: 1/27/2022  Authorization Period Expiration: 5/3/22  Plan of Care Expiration: 5/18/22  Visit # / Visits authorized: 6/ 8  Cancelled Visits: 0  No Show Visits: 0    Time In: 8:00a  Time Out: 8:55a  Total Billable Time: 55 minutes    Precautions: Standard    Subjective     Pt reports: She hasn't had any hip pain since last visit. She is having a good week in terms of pain.   She was compliant with home exercise program.  Response to previous treatment: improvement in pain  Functional change: ongoing    Pain: 2/10  Location: tailbone     Constitutional Symptoms Review: The patient denies having any constitutional symptoms.     Objective   Pt verbally consents to treatment today.  Signed consent form already on file.     Ada received the following manual therapy techniques: to decrease muscle spasm and pain relief for 15 minutes including: Dry needling to coccyx and levator ani, dry needling with stimulation to B coccygeus, B levator ani, B medial piriformis, coccyx    Ada received therapeutic exercises to develop  ROM and flexibility for 40 minutes including: self hip mobilizations with band: kneeling, quadruped, supine with hip flexion/extension    Home Exercises Provided and Patient Education Provided     Education provided:   - self hip mobilization  Discussed progression of plan of care with patient; educated pt in activity modification; reviewed HEP with pt. Pt demonstrated and verbalized understanding of all  instruction and was not provided with a handout of HEP (see Patient Instructions).    Written Home Exercises Provided: yes (youtube videos for self hip mobilization)  Exercises were reviewed and Ada was able to demonstrate them prior to the end of the session.  Ada demonstrated good  understanding of the education provided.     See EMR under Patient Instructions for exercises provided 04/06/2022    Assessment     Ada tolerated treatment well and demonstrated understanding of all education provided at today's visit. Normal response to dry needling. Maryneal with self hip mobilizations with theraband.    Ada Is progressing well towards her goals.   Pt prognosis is Good.     Pt will continue to benefit from skilled outpatient physical therapy to address the deficits listed in the problem list box on initial evaluation, provide pt/family education and to maximize pt's level of independence in the home and community environment.     Pt's spiritual, cultural and educational needs considered and pt agreeable to plan of care and goals.     Anticipated barriers to physical therapy: none    Goals:  Short Term Goals: 4 weeks   Pt to demonstrate proper diaphragmatic breathing to help with calming the nervous system in order to decrease pain and to improve abdominal wall musculature extensibility. MET 2/14  Pt to report being able to complete a half day at work without significant increase in pain to demonstrate a return towards prior level of function. PROGRESSING 3/23  Pt to report a decrease in pain to no more than 6/10 at it's worst with palpation to coccyx.  PROGRESSING 3/23  Pt will report minimal to no pain with single digit pelvic assessment and display relaxation during this assessment in order to progress to dilator/wand use. MET 3/23  Pt to report a decrease in urinary frequency from every 60 minutes to no more than once every 2 hours to improve ability to participate in social activities. MET 2/14     Long  Term Goals: 8 weeks   Pt to be discharged with home plan for carry over after discharge. PROGRESSING 3/23  Pt will be trained and compliant with postural strategies in sitting and standing to improve alignment and decrease pain and muscle fatigue MET 3/23  Pt to report being able to complete a full day at work without significant increase in pain to demonstrate a return towards prior level of function.PROGRESSING 3/23  Pt to report improved restorative sleeping, waking up no more than 1x/night from pain. MET 3/23  Pt to report being able to have a comfortable vaginal exam without significant increase in pelvic pain/discomfort. PROGRESSING 3/23  Pt to report a decrease in pain to no more than 3/10 at it's worst with direct coccyx palpation.  PROGRESSING 3/23  Pt will tolerate a medium sized dilator and/or pelvic wand in order to improve access to women's healthcare and allow for more comfortable manual treatment. PROGRESSING 3/23    Plan     Continue per established POC.     Daria Montano, PT , DPT

## 2022-04-12 ENCOUNTER — PATIENT MESSAGE (OUTPATIENT)
Dept: INTERNAL MEDICINE | Facility: CLINIC | Age: 32
End: 2022-04-12
Payer: MEDICARE

## 2022-04-13 ENCOUNTER — TELEPHONE (OUTPATIENT)
Dept: INTERNAL MEDICINE | Facility: CLINIC | Age: 32
End: 2022-04-13
Payer: MEDICARE

## 2022-04-13 ENCOUNTER — CLINICAL SUPPORT (OUTPATIENT)
Dept: REHABILITATION | Facility: HOSPITAL | Age: 32
End: 2022-04-13
Attending: HOSPITALIST
Payer: MEDICAID

## 2022-04-13 DIAGNOSIS — M53.3 COCCYDYNIA: ICD-10-CM

## 2022-04-13 DIAGNOSIS — M25.559 HIP PAIN: ICD-10-CM

## 2022-04-13 DIAGNOSIS — M62.89 HIGH-TONE PELVIC FLOOR DYSFUNCTION: ICD-10-CM

## 2022-04-13 DIAGNOSIS — R10.2 PELVIC PAIN IN FEMALE: ICD-10-CM

## 2022-04-13 PROCEDURE — 97140 MANUAL THERAPY 1/> REGIONS: CPT | Mod: PO

## 2022-04-13 NOTE — TELEPHONE ENCOUNTER
----- Message from Josefa Elizabeth sent at 4/13/2022 11:29 AM CDT -----  Contact: Pt [phone]  No blue slot available to schedule an appointment for the patient.  Patient is established with which PCP: Marisa  Reason for the visit: ADHD Meds   Would the patient like a call back, or a response through their [facility] portal?:  call back

## 2022-04-13 NOTE — PROGRESS NOTES
Pelvic Health Physical Therapy   Treatment Note     Name: Ada Ramirez  Clinic Number: [id number]    Therapy Diagnosis:   Encounter Diagnoses   Name Primary?    Hip pain     Coccydynia     High-tone pelvic floor dysfunction     Pelvic pain in female      Physician: Molly Cunningham MD    Visit Date: 4/13/2022    Physician Orders: Pelvic PT Eval and Treat  Medical Diagnosis from Referral: Coccydynia [M53.3], Pelvic pain in female [R10.2], High-tone pelvic floor dysfunction [N94.89]  Evaluation Date: 1/27/2022  Authorization Period Expiration: 5/3/22  Plan of Care Expiration: 5/18/22  Visit # / Visits authorized: 7/ 8  Cancelled Visits: 0  No Show Visits: 0    Time In: 8:00a  Time Out: 8:55a  Total Billable Time: 55 minutes    Precautions: Standard    Subjective     Pt reports: pain 6/10 in the tailbone due to prolonged sitting yesterday.    She was compliant with home exercise program.  Response to previous treatment: improvement in pain  Functional change: ongoing    Pain: 6/10  Location: tailbone     Constitutional Symptoms Review: The patient denies having any constitutional symptoms.     Objective   Pt verbally consents to treatment today.  Signed consent form already on file.     Ada received the following manual therapy techniques: to decrease muscle spasm and pain relief for 55 minutes including: Dry needling to B TFL and B glute med, B OI medial to ischial tuberosity. No e-stim applied. Cupping to coccygeal region   Home Exercises Provided and Patient Education Provided     Education provided:   - cont prior HEP  Discussed progression of plan of care with patient; educated pt in activity modification; reviewed HEP with pt. Pt demonstrated and verbalized understanding of all instruction and was not provided with a handout of HEP (see Patient Instructions).    Written Home Exercises Provided: cont prior HEP  Exercises were reviewed and Ada was able to demonstrate them prior to the end of the session.   Ada demonstrated good  understanding of the education provided.     See EMR under Patient Instructions for exercises provided prior visit.    Assessment     Ada tolerated treatment well and demonstrated understanding of all education provided at today's visit. Normal response to dry needling. Tolerated cupping well and noticed improved symptoms at the end of the session.    Ada Is progressing well towards her goals.   Pt prognosis is Good.     Pt will continue to benefit from skilled outpatient physical therapy to address the deficits listed in the problem list box on initial evaluation, provide pt/family education and to maximize pt's level of independence in the home and community environment.     Pt's spiritual, cultural and educational needs considered and pt agreeable to plan of care and goals.     Anticipated barriers to physical therapy: none    Goals:  Short Term Goals: 4 weeks   Pt to demonstrate proper diaphragmatic breathing to help with calming the nervous system in order to decrease pain and to improve abdominal wall musculature extensibility. MET 2/14  Pt to report being able to complete a half day at work without significant increase in pain to demonstrate a return towards prior level of function. PROGRESSING 3/23  Pt to report a decrease in pain to no more than 6/10 at it's worst with palpation to coccyx.  PROGRESSING 3/23  Pt will report minimal to no pain with single digit pelvic assessment and display relaxation during this assessment in order to progress to dilator/wand use. MET 3/23  Pt to report a decrease in urinary frequency from every 60 minutes to no more than once every 2 hours to improve ability to participate in social activities. MET 2/14     Long Term Goals: 8 weeks   Pt to be discharged with home plan for carry over after discharge. PROGRESSING 3/23  Pt will be trained and compliant with postural strategies in sitting and standing to improve alignment and decrease pain and muscle  fatigue MET 3/23  Pt to report being able to complete a full day at work without significant increase in pain to demonstrate a return towards prior level of function.PROGRESSING 3/23  Pt to report improved restorative sleeping, waking up no more than 1x/night from pain. MET 3/23  Pt to report being able to have a comfortable vaginal exam without significant increase in pelvic pain/discomfort. PROGRESSING 3/23  Pt to report a decrease in pain to no more than 3/10 at it's worst with direct coccyx palpation.  PROGRESSING 3/23  Pt will tolerate a medium sized dilator and/or pelvic wand in order to improve access to women's healthcare and allow for more comfortable manual treatment. PROGRESSING 3/23    Plan     Continue per established POC.     Daria Montano, PT , DPT

## 2022-04-14 ENCOUNTER — OFFICE VISIT (OUTPATIENT)
Dept: INTERNAL MEDICINE | Facility: CLINIC | Age: 32
End: 2022-04-14
Payer: MEDICAID

## 2022-04-14 DIAGNOSIS — F90.0 ADHD (ATTENTION DEFICIT HYPERACTIVITY DISORDER), INATTENTIVE TYPE: Primary | ICD-10-CM

## 2022-04-14 PROCEDURE — 1159F MED LIST DOCD IN RCRD: CPT | Mod: CPTII,95,, | Performed by: HOSPITALIST

## 2022-04-14 PROCEDURE — 1160F RVW MEDS BY RX/DR IN RCRD: CPT | Mod: CPTII,95,, | Performed by: HOSPITALIST

## 2022-04-14 PROCEDURE — 99213 OFFICE O/P EST LOW 20 MIN: CPT | Mod: 95,,, | Performed by: HOSPITALIST

## 2022-04-14 PROCEDURE — 1159F PR MEDICATION LIST DOCUMENTED IN MEDICAL RECORD: ICD-10-PCS | Mod: CPTII,95,, | Performed by: HOSPITALIST

## 2022-04-14 PROCEDURE — 1160F PR REVIEW ALL MEDS BY PRESCRIBER/CLIN PHARMACIST DOCUMENTED: ICD-10-PCS | Mod: CPTII,95,, | Performed by: HOSPITALIST

## 2022-04-14 PROCEDURE — 99213 PR OFFICE/OUTPT VISIT, EST, LEVL III, 20-29 MIN: ICD-10-PCS | Mod: 95,,, | Performed by: HOSPITALIST

## 2022-04-14 RX ORDER — LISDEXAMFETAMINE DIMESYLATE CAPSULES 20 MG/1
20 CAPSULE ORAL EVERY MORNING
Qty: 30 CAPSULE | Refills: 0 | Status: SHIPPED | OUTPATIENT
Start: 2022-04-14 | End: 2022-05-30 | Stop reason: SDUPTHER

## 2022-04-14 NOTE — PROGRESS NOTES
Virtual Visit  The patient location is: [place]   The chief complaint leading to consultation is: adhd  Visit type: Virtual visit with synchronous audio and video  Total time spent with patient: 10 min  Each patient to whom he or she provides medical services by telemedicine is:  (1) informed of the relationship between the physician and patient and the respective role of any other health care provider with respect to management of the patient; and (2) notified that he or she may decline to receive medical services by telemedicine and may withdraw from such care at any time.    Subjective:     @Patient ID: Ada Ramirez is a 31 y.o. female.    Chief Complaint: ADHD    HPI    32 yo F presents to discuss ADHD medication.  Reviewed patient's medical records from pediatrician's office in 2014 at [facility]. At the time patient was started on Adderall 5 mg b.i.d..  Patient reports that she did not like the way the medication made her feel.  States that it made her mood more irritable.      Review of Systems   Constitutional: Negative for chills and fever.   HENT: Negative for congestion and sore throat.    Eyes: Negative for pain and visual disturbance.   Respiratory: Negative for cough and shortness of breath.    Cardiovascular: Negative for chest pain and leg swelling.   Gastrointestinal: Negative for abdominal pain, nausea and vomiting.   Endocrine: Negative for polydipsia and polyuria.   Genitourinary: Negative for difficulty urinating and dysuria.   Musculoskeletal: Negative for arthralgias and back pain.   Skin: Negative for rash and wound.   Neurological: Negative for dizziness, weakness and headaches.   Psychiatric/Behavioral: Negative for agitation and confusion.     Past medical history, surgical history, and family medical history reviewed and updated as appropriate.    Medications and allergies reviewed.     Objective:     There were no vitals filed for this visit.  There is no height or  weight on file to calculate BMI.  Physical Exam  Constitutional:       Appearance: Normal appearance.   HENT:      Head: Normocephalic and atraumatic.   Pulmonary:      Effort: Pulmonary effort is normal.   Neurological:      Mental Status: She is alert and oriented to person, place, and time.   Psychiatric:         Mood and Affect: Mood normal.         Behavior: Behavior normal.         Lab Results   Component Value Date    WBC 4.18 03/15/2022    HGB 12.6 03/15/2022    HCT 38.7 03/15/2022     03/15/2022    CHOL 168 03/15/2022    TRIG 69 03/15/2022    HDL 74 03/15/2022    ALT 14 03/15/2022    AST 16 03/15/2022     03/15/2022    K 3.8 03/15/2022     03/15/2022    CREATININE 0.7 03/15/2022    BUN 9 03/15/2022    CO2 23 03/15/2022    TSH 1.907 03/15/2022    HGBA1C 4.7 07/16/2019       Assessment:     1. ADHD (attention deficit hyperactivity disorder), inattentive type      Plan:   Ada was seen today for adhd.    Diagnoses and all orders for this visit:    ADHD (attention deficit hyperactivity disorder), inattentive type    Other orders  -     lisdexamfetamine (VYVANSE) 20 MG capsule; Take 1 capsule (20 mg total) by mouth every morning.    I have reviewed the . Pt appears compliant. Rx sent to pharmacy.   Patient counseled that medication is a controlled substance and not to take with any other illegal substances.  Patient is aware that at any time can request a urine drug screen is concern for abuse of medication.  Patient counseled that will need to return to clinic every 3 months for continued refills of medication.  Patient also counseled that in the event patient loses medication will not be able to refill until next scheduled refill is due.  Patient counseled on possible side effects of taking Vyvanse including negative mood changes, palpitations, increased anxiety etc..  Addition patient counseled to notify MD of any issues.    This office note was created using MModal Dictation.  Any  errors in spelling or syntax may not have been identified and corrected on initial review prior to signing this note.      rtc 3 months   Molly Cunningham MD  Internal Medicine    4/14/2022

## 2022-04-20 ENCOUNTER — CLINICAL SUPPORT (OUTPATIENT)
Dept: REHABILITATION | Facility: HOSPITAL | Age: 32
End: 2022-04-20
Attending: HOSPITALIST
Payer: MEDICARE

## 2022-04-20 DIAGNOSIS — M62.89 HIGH-TONE PELVIC FLOOR DYSFUNCTION: ICD-10-CM

## 2022-04-20 DIAGNOSIS — M25.559 HIP PAIN: ICD-10-CM

## 2022-04-20 DIAGNOSIS — R10.2 PELVIC PAIN IN FEMALE: ICD-10-CM

## 2022-04-20 DIAGNOSIS — M53.3 COCCYDYNIA: ICD-10-CM

## 2022-04-20 PROCEDURE — 97140 MANUAL THERAPY 1/> REGIONS: CPT | Mod: PO

## 2022-04-20 NOTE — PROGRESS NOTES
Pelvic Health Physical Therapy   Treatment Note and Discharge Summary     Name: Ada Ramirez  Clinic Number: [id number]    Therapy Diagnosis:   Encounter Diagnoses   Name Primary?    Hip pain Yes    Coccydynia     High-tone pelvic floor dysfunction     Pelvic pain in female      Physician: Molly Cunningham MD    Visit Date: 4/20/2022    Physician Orders: Pelvic PT Eval and Treat  Medical Diagnosis from Referral: Coccydynia [M53.3], Pelvic pain in female [R10.2], High-tone pelvic floor dysfunction [N94.89]  Evaluation Date: 1/27/2022  Authorization Period Expiration: 5/3/22  Plan of Care Expiration: 5/18/22  Visit # / Visits authorized: 8/ 8  Cancelled Visits: 0  No Show Visits: 0    Time In: 8:00a  Time Out: 8:55a  Total Billable Time: 55 minutes    Precautions: Standard    Subjective     Pt reports: She feels good today - she felt great after cupping last time.  She was compliant with home exercise program.  Response to previous treatment: improvement in pain  Functional change: ongoing    Pain: 5/10  Location: tailbone     Constitutional Symptoms Review: The patient denies having any constitutional symptoms.     Objective   Pt verbally consents to treatment today.  Signed consent form already on file.     Ada received the following manual therapy techniques: to decrease muscle spasm and pain relief for 55 minutes including: Dry needling to B glute med, B piriformis, B coccygeus. No e-stim applied. Cupping to coccygeal region   Home Exercises Provided and Patient Education Provided     Education provided:   - cont prior HEP  Discussed progression of plan of care with patient; educated pt in activity modification; reviewed HEP with pt. Pt demonstrated and verbalized understanding of all instruction and was not provided with a handout of HEP (see Patient Instructions).    Written Home Exercises Provided: cont prior HEP  Exercises were reviewed and Ada was able to demonstrate them prior to the end of the  session.  Ada demonstrated good  understanding of the education provided.     See EMR under Patient Instructions for exercises provided prior visit.    Assessment     Ada tolerated treatment well and demonstrated understanding of all education provided at today's visit. Normal response to dry needling. Tolerated cupping well and noticed improved symptoms at the end of the session. She is appropriate for DC based on overall improvement plateau in symptoms and function. Her pain seems to have leveled off and she is able to manage her symptoms.    Ada Is progressing well towards her goals.   Pt prognosis is Good.   Pt's spiritual, cultural and educational needs considered and pt agreeable to plan of care and goals.     Anticipated barriers to physical therapy: none    Goals:  Short Term Goals: 4 weeks   Pt to demonstrate proper diaphragmatic breathing to help with calming the nervous system in order to decrease pain and to improve abdominal wall musculature extensibility. MET 2/14  Pt to report being able to complete a half day at work without significant increase in pain to demonstrate a return towards prior level of function. MET 4/20  Pt to report a decrease in pain to no more than 6/10 at it's worst with palpation to coccyx.  MET 4/20Pt will report minimal to no pain with single digit pelvic assessment and display relaxation during this assessment in order to progress to dilator/wand use. MET 3/23  Pt to report a decrease in urinary frequency from every 60 minutes to no more than once every 2 hours to improve ability to participate in social activities. MET 2/14     Long Term Goals: 8 weeks   Pt to be discharged with home plan for carry over after discharge. MET 4/20  Pt will be trained and compliant with postural strategies in sitting and standing to improve alignment and decrease pain and muscle fatigue MET 3/23  Pt to report being able to complete a full day at work without significant increase in pain to  demonstrate a return towards prior level of function. MET 4/20  Pt to report improved restorative sleeping, waking up no more than 1x/night from pain. MET 3/23  Pt to report being able to have a comfortable vaginal exam without significant increase in pelvic pain/discomfort. MET 4/20  Pt to report a decrease in pain to no more than 3/10 at it's worst with direct coccyx palpation. NOT MET 4/20  Pt will tolerate a medium sized dilator and/or pelvic wand in order to improve access to women's healthcare and allow for more comfortable manual treatment. MET 4/20    Plan   DC from pelvic health PT services.    Daria Montano, PT , DPT

## 2022-04-25 ENCOUNTER — PATIENT MESSAGE (OUTPATIENT)
Dept: INTERNAL MEDICINE | Facility: CLINIC | Age: 32
End: 2022-04-25
Payer: MEDICARE

## 2022-04-25 RX ORDER — VALACYCLOVIR HYDROCHLORIDE 500 MG/1
TABLET, FILM COATED ORAL
Qty: 90 TABLET | Refills: 3 | Status: SHIPPED | OUTPATIENT
Start: 2022-04-25

## 2022-04-25 NOTE — TELEPHONE ENCOUNTER
Hi--I had a cold sore this month and used the rest of my valcyclovir. The karsten wont allow me to request a refill without sending a message.      Thanks,  Ada

## 2022-04-25 NOTE — TELEPHONE ENCOUNTER
No new care gaps identified.  Powered by [employer]. Reference number: [id number].   4/25/2022 2:55:22 PM CDT

## 2022-05-11 ENCOUNTER — PATIENT MESSAGE (OUTPATIENT)
Dept: INTERNAL MEDICINE | Facility: CLINIC | Age: 32
End: 2022-05-11
Payer: MEDICARE

## 2022-05-11 RX ORDER — BUSPIRONE HYDROCHLORIDE 15 MG/1
15 TABLET ORAL 3 TIMES DAILY
Qty: 90 TABLET | Refills: 11 | Status: SHIPPED | OUTPATIENT
Start: 2022-05-11 | End: 2022-07-11

## 2022-05-25 ENCOUNTER — PATIENT MESSAGE (OUTPATIENT)
Dept: INTERNAL MEDICINE | Facility: CLINIC | Age: 32
End: 2022-05-25
Payer: MEDICARE

## 2022-05-30 RX ORDER — LISDEXAMFETAMINE DIMESYLATE CAPSULES 20 MG/1
20 CAPSULE ORAL EVERY MORNING
Qty: 30 CAPSULE | Refills: 0 | Status: SHIPPED | OUTPATIENT
Start: 2022-05-30 | End: 2022-07-11 | Stop reason: SDUPTHER

## 2022-05-30 NOTE — TELEPHONE ENCOUNTER
No new care gaps identified.  [facility] Embedded Care Gaps. Reference number: [id number]. 5/30/2022   3:16:44 PM CDT

## 2022-06-20 ENCOUNTER — PATIENT MESSAGE (OUTPATIENT)
Dept: INTERNAL MEDICINE | Facility: CLINIC | Age: 32
End: 2022-06-20

## 2022-07-11 ENCOUNTER — PATIENT MESSAGE (OUTPATIENT)
Dept: INTERNAL MEDICINE | Facility: CLINIC | Age: 32
End: 2022-07-11

## 2022-07-11 ENCOUNTER — OFFICE VISIT (OUTPATIENT)
Dept: INTERNAL MEDICINE | Facility: CLINIC | Age: 32
End: 2022-07-11
Payer: COMMERCIAL

## 2022-07-11 DIAGNOSIS — F41.1 GAD (GENERALIZED ANXIETY DISORDER): ICD-10-CM

## 2022-07-11 DIAGNOSIS — F90.0 ADHD (ATTENTION DEFICIT HYPERACTIVITY DISORDER), INATTENTIVE TYPE: Primary | ICD-10-CM

## 2022-07-11 PROCEDURE — 1159F PR MEDICATION LIST DOCUMENTED IN MEDICAL RECORD: ICD-10-PCS | Mod: CPTII,95,, | Performed by: HOSPITALIST

## 2022-07-11 PROCEDURE — 1160F PR REVIEW ALL MEDS BY PRESCRIBER/CLIN PHARMACIST DOCUMENTED: ICD-10-PCS | Mod: CPTII,95,, | Performed by: HOSPITALIST

## 2022-07-11 PROCEDURE — 1159F MED LIST DOCD IN RCRD: CPT | Mod: CPTII,95,, | Performed by: HOSPITALIST

## 2022-07-11 PROCEDURE — 1160F RVW MEDS BY RX/DR IN RCRD: CPT | Mod: CPTII,95,, | Performed by: HOSPITALIST

## 2022-07-11 PROCEDURE — 99213 PR OFFICE/OUTPT VISIT, EST, LEVL III, 20-29 MIN: ICD-10-PCS | Mod: 95,,, | Performed by: HOSPITALIST

## 2022-07-11 PROCEDURE — 99213 OFFICE O/P EST LOW 20 MIN: CPT | Mod: 95,,, | Performed by: HOSPITALIST

## 2022-07-11 RX ORDER — LISDEXAMFETAMINE DIMESYLATE CAPSULES 20 MG/1
20 CAPSULE ORAL EVERY MORNING
Qty: 30 CAPSULE | Refills: 0 | Status: SHIPPED | OUTPATIENT
Start: 2022-07-11 | End: 2022-07-12 | Stop reason: SDUPTHER

## 2022-07-11 RX ORDER — BUSPIRONE HYDROCHLORIDE 10 MG/1
10 TABLET ORAL DAILY
Qty: 30 TABLET | Refills: 11 | Status: SHIPPED | OUTPATIENT
Start: 2022-07-11 | End: 2023-04-05

## 2022-07-11 NOTE — PROGRESS NOTES
Virtual Visit  The patient location is: [place]   The chief complaint leading to consultation is: adhd  Visit type: Virtual visit with synchronous audio and video  Total time spent with patient: 5 min  Each patient to whom he or she provides medical services by telemedicine is:  (1) informed of the relationship between the physician and patient and the respective role of any other health care provider with respect to management of the patient; and (2) notified that he or she may decline to receive medical services by telemedicine and may withdraw from such care at any time.    Subjective:       @Patient ID: Ada Ramirez is a 31 y.o. female.    Chief Complaint: ADHD    HPI  30 yo F presents for f/u of ADHD medication.  Pt reports she is doing well with vyvanse. No side effects. Does note having dizziness with buspar. Only taking 15 mg tablet daily. Would like to decrease dose     Review of Systems   Constitutional: Negative for chills and fever.   HENT: Negative for congestion and sore throat.    Eyes: Negative for pain and visual disturbance.   Respiratory: Negative for cough and shortness of breath.    Cardiovascular: Negative for chest pain and leg swelling.   Gastrointestinal: Negative for abdominal pain, nausea and vomiting.   Endocrine: Negative for polydipsia and polyuria.   Genitourinary: Negative for difficulty urinating and dysuria.   Musculoskeletal: Negative for arthralgias and back pain.   Skin: Negative for rash and wound.   Neurological: Negative for dizziness, weakness and headaches.   Psychiatric/Behavioral: Negative for agitation and confusion.     Past medical history, surgical history, and family medical history reviewed and updated as appropriate.    Medications and allergies reviewed.     Objective:     There were no vitals filed for this visit.  There is no height or weight on file to calculate BMI.  Physical Exam  Constitutional:       Appearance: Normal appearance.   HENT:      Head:  Normocephalic and atraumatic.   Pulmonary:      Effort: Pulmonary effort is normal.   Neurological:      Mental Status: She is alert and oriented to person, place, and time.   Psychiatric:         Mood and Affect: Mood normal.         Behavior: Behavior normal.         Lab Results   Component Value Date    WBC 4.18 03/15/2022    HGB 12.6 03/15/2022    HCT 38.7 03/15/2022     03/15/2022    CHOL 168 03/15/2022    TRIG 69 03/15/2022    HDL 74 03/15/2022    ALT 14 03/15/2022    AST 16 03/15/2022     03/15/2022    K 3.8 03/15/2022     03/15/2022    CREATININE 0.7 03/15/2022    BUN 9 03/15/2022    CO2 23 03/15/2022    TSH 1.907 03/15/2022    HGBA1C 4.7 07/16/2019       Assessment:     1. ADHD (attention deficit hyperactivity disorder), inattentive type    2. RYAN (generalized anxiety disorder)      Plan:   Ada was seen today for adhd.    Diagnoses and all orders for this visit:    ADHD (attention deficit hyperactivity disorder), inattentive type  - I have reviewed the . Pt appears compliant with medication. Rx sent to pharmacy.     RYAN (generalized anxiety disorder)  - will decrease dose of buspar to 10 mg qday. Continue to monitor     Other orders  -     lisdexamfetamine (VYVANSE) 20 MG capsule; Take 1 capsule (20 mg total) by mouth every morning.  -     busPIRone (BUSPAR) 10 MG tablet; Take 1 tablet (10 mg total) by mouth once daily.          No follow-ups on file.    Molly Cunningham MD  Internal Medicine    7/11/2022

## 2022-07-12 ENCOUNTER — PATIENT MESSAGE (OUTPATIENT)
Dept: INTERNAL MEDICINE | Facility: CLINIC | Age: 32
End: 2022-07-12
Payer: COMMERCIAL

## 2022-07-12 RX ORDER — LISDEXAMFETAMINE DIMESYLATE CAPSULES 20 MG/1
20 CAPSULE ORAL EVERY MORNING
Qty: 30 CAPSULE | Refills: 0 | Status: SHIPPED | OUTPATIENT
Start: 2022-07-12 | End: 2022-09-06 | Stop reason: SDUPTHER

## 2022-07-13 ENCOUNTER — PATIENT MESSAGE (OUTPATIENT)
Dept: INTERNAL MEDICINE | Facility: CLINIC | Age: 32
End: 2022-07-13
Payer: COMMERCIAL

## 2022-07-13 ENCOUNTER — TELEPHONE (OUTPATIENT)
Dept: PHARMACY | Facility: CLINIC | Age: 32
End: 2022-07-13
Payer: COMMERCIAL

## 2022-07-25 ENCOUNTER — OFFICE VISIT (OUTPATIENT)
Dept: URGENT CARE | Facility: CLINIC | Age: 32
End: 2022-07-25
Payer: COMMERCIAL

## 2022-07-25 VITALS
DIASTOLIC BLOOD PRESSURE: 85 MMHG | WEIGHT: 140 LBS | HEART RATE: 82 BPM | OXYGEN SATURATION: 100 % | TEMPERATURE: 99 F | BODY MASS INDEX: 23.9 KG/M2 | HEIGHT: 64 IN | SYSTOLIC BLOOD PRESSURE: 122 MMHG | RESPIRATION RATE: 18 BRPM

## 2022-07-25 DIAGNOSIS — H72.91 TYMPANIC MEMBRANE RUPTURE, RIGHT: ICD-10-CM

## 2022-07-25 DIAGNOSIS — H60.311 ACUTE DIFFUSE OTITIS EXTERNA OF RIGHT EAR: Primary | ICD-10-CM

## 2022-07-25 LAB
CTP QC/QA: YES
CTP QC/QA: YES
POC MOLECULAR INFLUENZA A AGN: NEGATIVE
POC MOLECULAR INFLUENZA B AGN: NEGATIVE
SARS-COV-2 RDRP RESP QL NAA+PROBE: NEGATIVE

## 2022-07-25 PROCEDURE — 3074F SYST BP LT 130 MM HG: CPT | Mod: CPTII,S$GLB,, | Performed by: NURSE PRACTITIONER

## 2022-07-25 PROCEDURE — 3008F PR BODY MASS INDEX (BMI) DOCUMENTED: ICD-10-PCS | Mod: CPTII,S$GLB,, | Performed by: NURSE PRACTITIONER

## 2022-07-25 PROCEDURE — 3008F BODY MASS INDEX DOCD: CPT | Mod: CPTII,S$GLB,, | Performed by: NURSE PRACTITIONER

## 2022-07-25 PROCEDURE — 87502 INFLUENZA DNA AMP PROBE: CPT | Mod: QW,S$GLB,, | Performed by: NURSE PRACTITIONER

## 2022-07-25 PROCEDURE — 1160F PR REVIEW ALL MEDS BY PRESCRIBER/CLIN PHARMACIST DOCUMENTED: ICD-10-PCS | Mod: CPTII,S$GLB,, | Performed by: NURSE PRACTITIONER

## 2022-07-25 PROCEDURE — 1159F PR MEDICATION LIST DOCUMENTED IN MEDICAL RECORD: ICD-10-PCS | Mod: CPTII,S$GLB,, | Performed by: NURSE PRACTITIONER

## 2022-07-25 PROCEDURE — 99499 UNLISTED E&M SERVICE: CPT | Mod: S$GLB,,, | Performed by: NURSE PRACTITIONER

## 2022-07-25 PROCEDURE — 1160F RVW MEDS BY RX/DR IN RCRD: CPT | Mod: CPTII,S$GLB,, | Performed by: NURSE PRACTITIONER

## 2022-07-25 PROCEDURE — 3079F DIAST BP 80-89 MM HG: CPT | Mod: CPTII,S$GLB,, | Performed by: NURSE PRACTITIONER

## 2022-07-25 PROCEDURE — 3079F PR MOST RECENT DIASTOLIC BLOOD PRESSURE 80-89 MM HG: ICD-10-PCS | Mod: CPTII,S$GLB,, | Performed by: NURSE PRACTITIONER

## 2022-07-25 PROCEDURE — 87502 POCT INFLUENZA A/B MOLECULAR: ICD-10-PCS | Mod: QW,S$GLB,, | Performed by: NURSE PRACTITIONER

## 2022-07-25 PROCEDURE — 3074F PR MOST RECENT SYSTOLIC BLOOD PRESSURE < 130 MM HG: ICD-10-PCS | Mod: CPTII,S$GLB,, | Performed by: NURSE PRACTITIONER

## 2022-07-25 PROCEDURE — 99499 NO LOS: ICD-10-PCS | Mod: S$GLB,,, | Performed by: NURSE PRACTITIONER

## 2022-07-25 PROCEDURE — U0002: ICD-10-PCS | Mod: QW,S$GLB,, | Performed by: NURSE PRACTITIONER

## 2022-07-25 PROCEDURE — 1159F MED LIST DOCD IN RCRD: CPT | Mod: CPTII,S$GLB,, | Performed by: NURSE PRACTITIONER

## 2022-07-25 PROCEDURE — U0002 COVID-19 LAB TEST NON-CDC: HCPCS | Mod: QW,S$GLB,, | Performed by: NURSE PRACTITIONER

## 2022-07-25 RX ORDER — AZITHROMYCIN 250 MG/1
TABLET, FILM COATED ORAL
Qty: 6 TABLET | Refills: 0 | Status: SHIPPED | OUTPATIENT
Start: 2022-07-25 | End: 2022-10-21

## 2022-07-25 NOTE — PATIENT INSTRUCTIONS
Start Flonase, follow up with ENT if no improvement     You must understand that you've received an Urgent Care treatment only and that you may be released before all your medical problems are known or treated. You, the patient, will arrange for follow up care as instructed.  If your condition worsens we recommend that you receive another evaluation at the emergency room immediately or contact your primary medical clinics after hours call service to discuss your concerns.  Please return here or go to the Emergency Department for any concerns or worsening of condition.

## 2022-07-25 NOTE — PROGRESS NOTES
"Subjective:       Patient ID: Ada Ramirez is a 31 y.o. female.    Vitals:  height is 5' 4" (1.626 m) and weight is 63.5 kg (140 lb). Her temperature is 98.7 °F (37.1 °C). Her blood pressure is 122/85 and her pulse is 82. Her respiration is 18 and oxygen saturation is 100%.     Chief Complaint: Sore Throat    Sore Throat   This is a new problem. The current episode started yesterday. The problem has been gradually worsening. Neither side of throat is experiencing more pain than the other. There has been no fever. The pain is at a severity of 1/10. The patient is experiencing no pain. Associated symptoms include ear pain, headaches, a plugged ear sensation and trouble swallowing. Pertinent negatives include no abdominal pain, congestion, coughing, diarrhea, drooling, ear discharge, hoarse voice, neck pain, shortness of breath, stridor, swollen glands or vomiting. Associated symptoms comments: Nasal drainage clear.   . She has had no exposure to strep or mono. She has tried nothing for the symptoms. The treatment provided no relief.       HENT: Positive for ear pain, sore throat and trouble swallowing. Negative for ear discharge, drooling and congestion.    Neck: Negative for neck pain.   Respiratory: Negative for cough, shortness of breath and stridor.    Gastrointestinal: Negative for abdominal pain, vomiting and diarrhea.   Neurological: Positive for headaches.       Objective:      Physical Exam   Constitutional: She is oriented to person, place, and time. She appears well-developed. She is cooperative.  Non-toxic appearance. She does not appear ill. No distress.   HENT:   Head: Normocephalic and atraumatic.   Ears:   Right Ear: Hearing, external ear and ear canal normal. Tympanic membrane is injected and erythematous.   Left Ear: Hearing, tympanic membrane, external ear and ear canal normal.   Nose: Nose normal. No mucosal edema, rhinorrhea or nasal deformity. No epistaxis. Right sinus exhibits no maxillary " sinus tenderness and no frontal sinus tenderness. Left sinus exhibits no maxillary sinus tenderness and no frontal sinus tenderness.   Mouth/Throat: Uvula is midline, oropharynx is clear and moist and mucous membranes are normal. No trismus in the jaw. Normal dentition. No uvula swelling. No oropharyngeal exudate, posterior oropharyngeal edema or posterior oropharyngeal erythema.   Right TM rupture noted      Comments: Right TM rupture noted  Eyes: Conjunctivae and lids are normal. No scleral icterus.   Neck: Trachea normal and phonation normal. Neck supple. No edema present. No erythema present. No neck rigidity present.   Cardiovascular: Normal rate, regular rhythm, normal heart sounds and normal pulses.   Pulmonary/Chest: Effort normal and breath sounds normal. No respiratory distress. She has no decreased breath sounds. She has no rhonchi.   Abdominal: Normal appearance.   Musculoskeletal: Normal range of motion.         General: No deformity. Normal range of motion.   Neurological: She is alert and oriented to person, place, and time. She exhibits normal muscle tone. Coordination normal.   Skin: Skin is warm, dry, intact, not diaphoretic and not pale.   Psychiatric: Her speech is normal and behavior is normal. Judgment and thought content normal.   Nursing note and vitals reviewed.        Results for orders placed or performed in visit on 07/25/22   POCT COVID-19 Rapid Screening   Result Value Ref Range    POC Rapid COVID Negative Negative     Acceptable Yes    POCT Influenza A/B MOLECULAR   Result Value Ref Range    POC Molecular Influenza A Ag Negative Negative, Not Reported    POC Molecular Influenza B Ag Negative Negative, Not Reported     Acceptable Yes        Assessment:       1. Acute diffuse otitis externa of right ear    2. Tympanic membrane rupture, right          Plan:         Acute diffuse otitis externa of right ear  -     POCT COVID-19 Rapid Screening  -     POCT  Influenza A/B MOLECULAR  -     azithromycin (Z-DALE) 250 MG tablet; Take 2 tablets by mouth x 1 for day 1 Then take 1 tablet by mouth daily for day 2 - 5  Dispense: 6 tablet; Refill: 0    Tympanic membrane rupture, right  -     azithromycin (Z-DALE) 250 MG tablet; Take 2 tablets by mouth x 1 for day 1 Then take 1 tablet by mouth daily for day 2 - 5  Dispense: 6 tablet; Refill: 0

## 2022-08-01 ENCOUNTER — PATIENT MESSAGE (OUTPATIENT)
Dept: INTERNAL MEDICINE | Facility: CLINIC | Age: 32
End: 2022-08-01
Payer: COMMERCIAL

## 2022-08-18 ENCOUNTER — PATIENT MESSAGE (OUTPATIENT)
Dept: INTERNAL MEDICINE | Facility: CLINIC | Age: 32
End: 2022-08-18
Payer: COMMERCIAL

## 2022-08-19 ENCOUNTER — PATIENT MESSAGE (OUTPATIENT)
Dept: INTERNAL MEDICINE | Facility: CLINIC | Age: 32
End: 2022-08-19
Payer: COMMERCIAL

## 2022-09-06 NOTE — TELEPHONE ENCOUNTER
No new care gaps identified.  [facility] Embedded Care Gaps. Reference number: [id number]. 9/06/2022   2:29:26 PM CDT

## 2022-09-07 ENCOUNTER — PATIENT MESSAGE (OUTPATIENT)
Dept: INTERNAL MEDICINE | Facility: CLINIC | Age: 32
End: 2022-09-07
Payer: COMMERCIAL

## 2022-09-07 RX ORDER — LISDEXAMFETAMINE DIMESYLATE CAPSULES 20 MG/1
20 CAPSULE ORAL EVERY MORNING
Qty: 30 CAPSULE | Refills: 0 | Status: SHIPPED | OUTPATIENT
Start: 2022-09-07 | End: 2022-10-21 | Stop reason: SDUPTHER

## 2022-09-23 ENCOUNTER — PATIENT MESSAGE (OUTPATIENT)
Dept: INTERNAL MEDICINE | Facility: CLINIC | Age: 32
End: 2022-09-23
Payer: COMMERCIAL

## 2022-10-14 ENCOUNTER — OFFICE VISIT (OUTPATIENT)
Dept: URGENT CARE | Facility: CLINIC | Age: 32
End: 2022-10-14
Payer: COMMERCIAL

## 2022-10-14 VITALS
OXYGEN SATURATION: 100 % | BODY MASS INDEX: 23.9 KG/M2 | DIASTOLIC BLOOD PRESSURE: 79 MMHG | RESPIRATION RATE: 20 BRPM | HEART RATE: 93 BPM | WEIGHT: 140 LBS | SYSTOLIC BLOOD PRESSURE: 120 MMHG | HEIGHT: 64 IN | TEMPERATURE: 99 F

## 2022-10-14 DIAGNOSIS — Z86.19 HISTORY OF HERPES SIMPLEX INFECTION: Primary | ICD-10-CM

## 2022-10-14 PROCEDURE — 99213 OFFICE O/P EST LOW 20 MIN: CPT | Mod: S$GLB,,, | Performed by: NURSE PRACTITIONER

## 2022-10-14 PROCEDURE — 1159F PR MEDICATION LIST DOCUMENTED IN MEDICAL RECORD: ICD-10-PCS | Mod: CPTII,S$GLB,, | Performed by: NURSE PRACTITIONER

## 2022-10-14 PROCEDURE — 3074F SYST BP LT 130 MM HG: CPT | Mod: CPTII,S$GLB,, | Performed by: NURSE PRACTITIONER

## 2022-10-14 PROCEDURE — 3078F DIAST BP <80 MM HG: CPT | Mod: CPTII,S$GLB,, | Performed by: NURSE PRACTITIONER

## 2022-10-14 PROCEDURE — 1160F PR REVIEW ALL MEDS BY PRESCRIBER/CLIN PHARMACIST DOCUMENTED: ICD-10-PCS | Mod: CPTII,S$GLB,, | Performed by: NURSE PRACTITIONER

## 2022-10-14 PROCEDURE — 3078F PR MOST RECENT DIASTOLIC BLOOD PRESSURE < 80 MM HG: ICD-10-PCS | Mod: CPTII,S$GLB,, | Performed by: NURSE PRACTITIONER

## 2022-10-14 PROCEDURE — 1159F MED LIST DOCD IN RCRD: CPT | Mod: CPTII,S$GLB,, | Performed by: NURSE PRACTITIONER

## 2022-10-14 PROCEDURE — 99213 PR OFFICE/OUTPT VISIT, EST, LEVL III, 20-29 MIN: ICD-10-PCS | Mod: S$GLB,,, | Performed by: NURSE PRACTITIONER

## 2022-10-14 PROCEDURE — 3074F PR MOST RECENT SYSTOLIC BLOOD PRESSURE < 130 MM HG: ICD-10-PCS | Mod: CPTII,S$GLB,, | Performed by: NURSE PRACTITIONER

## 2022-10-14 PROCEDURE — 1160F RVW MEDS BY RX/DR IN RCRD: CPT | Mod: CPTII,S$GLB,, | Performed by: NURSE PRACTITIONER

## 2022-10-14 RX ORDER — VALACYCLOVIR HYDROCHLORIDE 1 G/1
2000 TABLET, FILM COATED ORAL 2 TIMES DAILY
Qty: 4 TABLET | Refills: 0 | Status: SHIPPED | OUTPATIENT
Start: 2022-10-14 | End: 2022-10-15

## 2022-10-14 NOTE — PATIENT INSTRUCTIONS
You must understand that you've received an Urgent Care treatment only and that you may be released before all your medical problems are known or treated. You, the patient, will arrange for follow up care as instructed.  If your condition worsens we recommend that you receive another evaluation at the emergency room immediately or contact your primary medical clinics after hours call service to discuss your concerns.  Please return here or go to the Emergency Department for any concerns or worsening of condition.

## 2022-10-14 NOTE — PROGRESS NOTES
"Subjective:       Patient ID: Ada Ramirez is a 32 y.o. female.    Vitals:  height is 5' 4" (1.626 m) and weight is 63.5 kg (140 lb). Her temperature is 98.6 °F (37 °C). Her blood pressure is 120/79 and her pulse is 93. Her respiration is 20 and oxygen saturation is 100%.     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 Pt reports she has a cold sore in her mouth. Pt reports denies pain.     Mouth Lesions   The current episode started 2 days ago. The onset was gradual. The problem occurs rarely. The problem has been unchanged. The problem is mild. Nothing relieves the symptoms. Nothing aggravates the symptoms. Associated symptoms include mouth sores. Pertinent negatives include no orthopnea, no fever, no decreased vision, no double vision, no eye itching, no constipation, no nausea, no rhinorrhea, no sore throat, no stridor, no swollen glands, no muscle aches, no neck pain, no neck stiffness, no cough, no rash, no diaper rash, no eye discharge and no eye pain. Urine output has been normal. There were no sick contacts.     Constitution: Negative for fever.   HENT:  Positive for mouth sores. Negative for sore throat.    Neck: Negative for neck pain.   Eyes:  Negative for eye discharge, eye itching, eye pain and double vision.   Respiratory:  Negative for cough and stridor.    Gastrointestinal:  Negative for nausea and constipation.   Skin:  Negative for rash and erythema.     Objective:      Physical Exam   Constitutional: She is oriented to person, place, and time. She appears well-developed.   HENT:   Head: Normocephalic and atraumatic. Head is without abrasion, without contusion and without laceration.   Ears:   Right Ear: External ear normal.   Left Ear: External ear normal.   Nose: Nose normal.   Mouth/Throat: Oropharynx is clear and moist and mucous membranes are normal.       Eyes: Conjunctivae, EOM and lids are normal. Pupils are equal, round, and reactive to light.   Neck: Trachea normal and phonation " normal. Neck supple.   Cardiovascular: Normal rate, regular rhythm and normal heart sounds.   Pulmonary/Chest: Effort normal and breath sounds normal. No stridor. No respiratory distress.   Musculoskeletal: Normal range of motion.         General: Normal range of motion.   Neurological: She is alert and oriented to person, place, and time.   Skin: Skin is warm, dry, intact and no rash. Capillary refill takes less than 2 seconds. No abrasion, No burn, No bruising, No erythema and No ecchymosis   Psychiatric: Her speech is normal and behavior is normal. Judgment and thought content normal.   Nursing note and vitals reviewed.      Assessment:       1. History of herpes simplex infection          Plan:         History of herpes simplex infection  -     valACYclovir (VALTREX) 1000 MG tablet; Take 2 tablets (2,000 mg total) by mouth 2 (two) times daily. for 1 day  Dispense: 4 tablet; Refill: 0               Patient Instructions     You must understand that you've received an Urgent Care treatment only and that you may be released before all your medical problems are known or treated. You, the patient, will arrange for follow up care as instructed.  If your condition worsens we recommend that you receive another evaluation at the emergency room immediately or contact your primary medical clinics after hours call service to discuss your concerns.  Please return here or go to the Emergency Department for any concerns or worsening of condition.

## 2022-10-17 ENCOUNTER — PATIENT MESSAGE (OUTPATIENT)
Dept: INTERNAL MEDICINE | Facility: CLINIC | Age: 32
End: 2022-10-17
Payer: COMMERCIAL

## 2022-10-18 ENCOUNTER — PATIENT MESSAGE (OUTPATIENT)
Dept: INTERNAL MEDICINE | Facility: CLINIC | Age: 32
End: 2022-10-18
Payer: COMMERCIAL

## 2022-10-21 ENCOUNTER — OFFICE VISIT (OUTPATIENT)
Dept: INTERNAL MEDICINE | Facility: CLINIC | Age: 32
End: 2022-10-21
Payer: MEDICARE

## 2022-10-21 DIAGNOSIS — F90.0 ADHD (ATTENTION DEFICIT HYPERACTIVITY DISORDER), INATTENTIVE TYPE: Primary | ICD-10-CM

## 2022-10-21 PROCEDURE — 99213 OFFICE O/P EST LOW 20 MIN: CPT | Mod: 95,,, | Performed by: HOSPITALIST

## 2022-10-21 PROCEDURE — 99213 PR OFFICE/OUTPT VISIT, EST, LEVL III, 20-29 MIN: ICD-10-PCS | Mod: 95,,, | Performed by: HOSPITALIST

## 2022-10-21 RX ORDER — LISDEXAMFETAMINE DIMESYLATE CAPSULES 20 MG/1
20 CAPSULE ORAL EVERY MORNING
Qty: 30 CAPSULE | Refills: 0 | Status: SHIPPED | OUTPATIENT
Start: 2022-10-21 | End: 2022-11-30 | Stop reason: SDUPTHER

## 2022-10-21 NOTE — PROGRESS NOTES
Virtual Visit  The patient location is: [place]   The chief complaint leading to consultation is: adhd  Visit type: Virtual visit with synchronous audio and video  Total time spent with patient: 5 min  Each patient to whom he or she provides medical services by telemedicine is:  (1) informed of the relationship between the physician and patient and the respective role of any other health care provider with respect to management of the patient; and (2) notified that he or she may decline to receive medical services by telemedicine and may withdraw from such care at any time.    Subjective:         @Patient ID: Ada Ramirez is a 32 y.o. female.    Chief Complaint: ADHD     HPI    33 yo F presents for f/u of ADHD medication.  Pt reports she is doing fine with vyvanse 20 mg qday. No issues    Review of Systems   Constitutional:  Negative for chills and fever.   HENT:  Negative for congestion and sore throat.    Eyes:  Negative for pain and visual disturbance.   Respiratory:  Negative for cough and shortness of breath.    Cardiovascular:  Negative for chest pain and leg swelling.   Gastrointestinal:  Negative for abdominal pain, nausea and vomiting.   Endocrine: Negative for polydipsia and polyuria.   Genitourinary:  Negative for difficulty urinating and dysuria.   Musculoskeletal:  Negative for arthralgias and back pain.   Skin:  Negative for rash and wound.   Neurological:  Negative for dizziness, weakness and headaches.   Psychiatric/Behavioral:  Negative for agitation and confusion.    Past medical history, surgical history, and family medical history reviewed and updated as appropriate.    Medications and allergies reviewed.     Objective:     There were no vitals filed for this visit.  There is no height or weight on file to calculate BMI.  Physical Exam  Constitutional:       Appearance: Normal appearance.   HENT:      Head: Normocephalic and atraumatic.   Pulmonary:      Effort: Pulmonary effort is normal.    Neurological:      Mental Status: She is alert and oriented to person, place, and time.   Psychiatric:         Mood and Affect: Mood normal.         Behavior: Behavior normal.       Lab Results   Component Value Date    WBC 4.18 03/15/2022    HGB 12.6 03/15/2022    HCT 38.7 03/15/2022     03/15/2022    CHOL 168 03/15/2022    TRIG 69 03/15/2022    HDL 74 03/15/2022    ALT 14 03/15/2022    AST 16 03/15/2022     03/15/2022    K 3.8 03/15/2022     03/15/2022    CREATININE 0.7 03/15/2022    BUN 9 03/15/2022    CO2 23 03/15/2022    TSH 1.907 03/15/2022    HGBA1C 4.7 07/16/2019       Assessment:     1. ADHD (attention deficit hyperactivity disorder), inattentive type      Plan:   Diagnoses and all orders for this visit:    ADHD (attention deficit hyperactivity disorder), inattentive type    Other orders  -     lisdexamfetamine (VYVANSE) 20 MG capsule; Take 1 capsule (20 mg total) by mouth every morning.      I have reviewed the . Pt appears compliant with medication. Rx sent to pharmacy.     No follow-ups on file.    Molly Cunningham MD  Internal Medicine    10/21/2022

## 2022-11-06 ENCOUNTER — OFFICE VISIT (OUTPATIENT)
Dept: URGENT CARE | Facility: CLINIC | Age: 32
End: 2022-11-06
Payer: COMMERCIAL

## 2022-11-06 VITALS
DIASTOLIC BLOOD PRESSURE: 72 MMHG | WEIGHT: 140 LBS | SYSTOLIC BLOOD PRESSURE: 101 MMHG | RESPIRATION RATE: 18 BRPM | BODY MASS INDEX: 23.9 KG/M2 | HEIGHT: 64 IN | HEART RATE: 95 BPM | OXYGEN SATURATION: 100 % | TEMPERATURE: 99 F

## 2022-11-06 DIAGNOSIS — J06.9 ACUTE URI: Primary | ICD-10-CM

## 2022-11-06 DIAGNOSIS — B34.9 VIRAL ILLNESS: ICD-10-CM

## 2022-11-06 LAB
CTP QC/QA: YES
POC MOLECULAR INFLUENZA A AGN: NEGATIVE
POC MOLECULAR INFLUENZA B AGN: NEGATIVE

## 2022-11-06 PROCEDURE — 3078F DIAST BP <80 MM HG: CPT | Mod: CPTII,S$GLB,, | Performed by: FAMILY MEDICINE

## 2022-11-06 PROCEDURE — 3008F BODY MASS INDEX DOCD: CPT | Mod: CPTII,S$GLB,, | Performed by: FAMILY MEDICINE

## 2022-11-06 PROCEDURE — 1160F RVW MEDS BY RX/DR IN RCRD: CPT | Mod: CPTII,S$GLB,, | Performed by: FAMILY MEDICINE

## 2022-11-06 PROCEDURE — 1159F PR MEDICATION LIST DOCUMENTED IN MEDICAL RECORD: ICD-10-PCS | Mod: CPTII,S$GLB,, | Performed by: FAMILY MEDICINE

## 2022-11-06 PROCEDURE — 87502 INFLUENZA DNA AMP PROBE: CPT | Mod: QW,S$GLB,, | Performed by: FAMILY MEDICINE

## 2022-11-06 PROCEDURE — 3074F PR MOST RECENT SYSTOLIC BLOOD PRESSURE < 130 MM HG: ICD-10-PCS | Mod: CPTII,S$GLB,, | Performed by: FAMILY MEDICINE

## 2022-11-06 PROCEDURE — 87502 POCT INFLUENZA A/B MOLECULAR: ICD-10-PCS | Mod: QW,S$GLB,, | Performed by: FAMILY MEDICINE

## 2022-11-06 PROCEDURE — 99213 OFFICE O/P EST LOW 20 MIN: CPT | Mod: S$GLB,,, | Performed by: FAMILY MEDICINE

## 2022-11-06 PROCEDURE — 1160F PR REVIEW ALL MEDS BY PRESCRIBER/CLIN PHARMACIST DOCUMENTED: ICD-10-PCS | Mod: CPTII,S$GLB,, | Performed by: FAMILY MEDICINE

## 2022-11-06 PROCEDURE — 3074F SYST BP LT 130 MM HG: CPT | Mod: CPTII,S$GLB,, | Performed by: FAMILY MEDICINE

## 2022-11-06 PROCEDURE — 99213 PR OFFICE/OUTPT VISIT, EST, LEVL III, 20-29 MIN: ICD-10-PCS | Mod: S$GLB,,, | Performed by: FAMILY MEDICINE

## 2022-11-06 PROCEDURE — 3008F PR BODY MASS INDEX (BMI) DOCUMENTED: ICD-10-PCS | Mod: CPTII,S$GLB,, | Performed by: FAMILY MEDICINE

## 2022-11-06 PROCEDURE — 3078F PR MOST RECENT DIASTOLIC BLOOD PRESSURE < 80 MM HG: ICD-10-PCS | Mod: CPTII,S$GLB,, | Performed by: FAMILY MEDICINE

## 2022-11-06 PROCEDURE — 1159F MED LIST DOCD IN RCRD: CPT | Mod: CPTII,S$GLB,, | Performed by: FAMILY MEDICINE

## 2022-11-06 RX ORDER — IBUPROFEN 800 MG/1
800 TABLET ORAL 3 TIMES DAILY PRN
Qty: 21 TABLET | Refills: 0 | Status: SHIPPED | OUTPATIENT
Start: 2022-11-06 | End: 2022-11-13

## 2022-11-06 NOTE — PROGRESS NOTES
"Subjective:       Patient ID: Ada Ramirez is a 32 y.o. female.      Chief Complaint: URI    Pt is coming in today with complaints of uri symptoms. Symptoms started yesterday. Worse. Temperature of 99.5 this morning. Sore throat, nasal congestion, and sneezing unchanged. Entire throat with trouble swallowing. Symptoms worse at night. Effexor, ibuprofen and decongestant taken with mild relief    URI   This is a new problem. The current episode started yesterday. The problem has been gradually worsening. There has been no fever. Associated symptoms include congestion and a sore throat. Pertinent negatives include no chest pain, coughing, dysuria, nausea, rash or vomiting. She has tried acetaminophen, decongestant and NSAIDs for the symptoms. The treatment provided mild relief.     Constitution: Negative for activity change, appetite change, chills, fatigue, fever and generalized weakness.   HENT:  Positive for congestion and sore throat. Negative for postnasal drip and sinus pressure.    Neck: Negative for neck swelling.   Cardiovascular:  Negative for chest pain, leg swelling, palpitations, sob on exertion and passing out.   Eyes:  Negative for vision loss.   Respiratory:  Negative for chest tightness, cough and shortness of breath.    Gastrointestinal:  Negative for nausea and vomiting.   Genitourinary:  Negative for dysuria.   Skin:  Negative for rash.   Neurological:  Negative for passing out, altered mental status and numbness.   Psychiatric/Behavioral:  Negative for altered mental status, confusion and agitation.      Objective:      Vitals:    11/06/22 1630   BP: 101/72   Pulse: 95   Resp: 18   Temp: 98.7 °F (37.1 °C)   SpO2: 100%   Weight: 63.5 kg (140 lb)   Height: 5' 4" (1.626 m)      Physical Exam   Constitutional: She is oriented to person, place, and time. She appears well-developed. She is cooperative.  Non-toxic appearance. She does not appear ill. No distress.   HENT:   Head: Normocephalic and " atraumatic.   Ears:   Right Ear: Hearing, tympanic membrane, external ear and ear canal normal.   Left Ear: Hearing, tympanic membrane, external ear and ear canal normal.   Nose: Congestion present. No mucosal edema, rhinorrhea or nasal deformity. No epistaxis. Right sinus exhibits no maxillary sinus tenderness and no frontal sinus tenderness. Left sinus exhibits no maxillary sinus tenderness and no frontal sinus tenderness.   Mouth/Throat: Uvula is midline and mucous membranes are normal. No trismus in the jaw. Normal dentition. No uvula swelling. Posterior oropharyngeal erythema present. No oropharyngeal exudate or posterior oropharyngeal edema.   Eyes: Conjunctivae and lids are normal. No scleral icterus.   Neck: Trachea normal and phonation normal. Neck supple. No edema present. No erythema present. No neck rigidity present.   Cardiovascular: Normal rate, regular rhythm, normal heart sounds and normal pulses.   Pulmonary/Chest: Effort normal and breath sounds normal. No respiratory distress. She has no decreased breath sounds. She has no rhonchi.   Abdominal: Normal appearance and bowel sounds are normal. Soft.   Musculoskeletal: Normal range of motion.         General: No deformity. Normal range of motion.   Neurological: no focal deficit. She is alert and oriented to person, place, and time. She exhibits normal muscle tone. Coordination normal.   Skin: Skin is warm, intact and not diaphoretic.   Psychiatric: Her speech is normal and behavior is normal. Judgment and thought content normal.   Nursing note and vitals reviewed.      Results for orders placed or performed in visit on 11/06/22   POCT Influenza A/B MOLECULAR   Result Value Ref Range    POC Molecular Influenza A Ag Negative Negative, Not Reported    POC Molecular Influenza B Ag Negative Negative, Not Reported     Acceptable Yes       Assessment:       1. Acute URI    2. Viral illness          Plan:         Acute URI  -     POCT  Influenza A/B MOLECULAR    2. Viral illness  Patient has Flonase at home, instructed to use it  Declined COVID test today (has ability to test at home)  Other orders  -     ibuprofen (ADVIL,MOTRIN) 800 MG tablet; Take 1 tablet (800 mg total) by mouth 3 (three) times daily as needed for Pain. Take with meals  Dispense: 21 tablet; Refill: 0    Patient Instructions   Below are suggestions for symptomatic relief of your upper respiratory symptoms:              -Salt water gargles to soothe throat pain.              -Chloroseptic spray and Cepacol lozenges also help to numb throat pain.              -Warm herbal teas with honey/lemon/ginger can help soothe sore throat and hoarseness              -Nasal saline spray reduces inflammation and dryness.              -Warm face compresses to help with facial sinus pain/pressure.              -Humidifiers and steam can help with nasal dryness and congestion              -Vicks vapor rub at night for chest congestion.              -Flonase OTC or Nasacort OTC for nasal congestion and post-nasal drip. Ok to use twice daily for the first week, then reduce to once daily after symptoms have begun to improve.              -Afrin is a nasal spray that can give immediate relief of nasal congestion but you cannot use this medication for more than 3 days              -Simple foods like chicken noodle soup.              - Mucinex for congestion or Mucinex DM for cough during the day time. Delsym helps with coughing at night. Mucinex-D if you have sinus pressure/sinus pain or chest congestion. (caution if history of high blood pressure or palpitations). You must increase your water intake when using expectorants (Mucinex).             -Zyrtec/Claritin/Allegra/Xyzal should help with allergies.  -If you DO NOT have Hypertension or any history of palpitations, it is ok to take over the counter Sudafed or Mucinex D or Allegra-D or Claritin-D or Zyrtec-D.  -If you do take one of the above, it  is ok to combine that with plain over the counter Mucinex or Allegra or Claritin or Zyrtec. If, for example, you are taking Zyrtec -D, you can combine that with Mucinex, but not Mucinex-D.  If you are taking Mucinex-D, you can combine that with plain Allegra or Claritin or Zyrtec.   -If you DO have Hypertension or palpitations, it is safe to take Coricidin HBP for relief of sinus symptoms.     Seek immediate care in the emergency room in the event of severe abdominal pain, chest pain, respiratory distress, fever unresponsive to antipyretic, dehydration, loss of consciousness, seizure.

## 2022-11-06 NOTE — PATIENT INSTRUCTIONS
Below are suggestions for symptomatic relief of your upper respiratory symptoms:              -Salt water gargles to soothe throat pain.              -Chloroseptic spray and Cepacol lozenges also help to numb throat pain.              -Warm herbal teas with honey/lemon/jaun can help soothe sore throat and hoarseness              -Nasal saline spray reduces inflammation and dryness.              -Warm face compresses to help with facial sinus pain/pressure.              -Humidifiers and steam can help with nasal dryness and congestion              -Vicks vapor rub at night for chest congestion.              -Flonase OTC or Nasacort OTC for nasal congestion and post-nasal drip. Ok to use twice daily for the first week, then reduce to once daily after symptoms have begun to improve.              -Afrin is a nasal spray that can give immediate relief of nasal congestion but you cannot use this medication for more than 3 days              -Simple foods like chicken noodle soup.              - Mucinex for congestion or Mucinex DM for cough during the day time. Delsym helps with coughing at night. Mucinex-D if you have sinus pressure/sinus pain or chest congestion. (caution if history of high blood pressure or palpitations). You must increase your water intake when using expectorants (Mucinex).             -Zyrtec/Claritin/Allegra/Xyzal should help with allergies.  -If you DO NOT have Hypertension or any history of palpitations, it is ok to take over the counter Sudafed or Mucinex D or Allegra-D or Claritin-D or Zyrtec-D.  -If you do take one of the above, it is ok to combine that with plain over the counter Mucinex or Allegra or Claritin or Zyrtec. If, for example, you are taking Zyrtec -D, you can combine that with Mucinex, but not Mucinex-D.  If you are taking Mucinex-D, you can combine that with plain Allegra or Claritin or Zyrtec.   -If you DO have Hypertension or palpitations, it is safe to take Coricidin HBP for  relief of sinus symptoms.     Seek immediate care in the emergency room in the event of severe abdominal pain, chest pain, respiratory distress, fever unresponsive to antipyretic, dehydration, loss of consciousness, seizure.

## 2022-11-30 ENCOUNTER — PATIENT MESSAGE (OUTPATIENT)
Dept: INTERNAL MEDICINE | Facility: CLINIC | Age: 32
End: 2022-11-30
Payer: COMMERCIAL

## 2022-11-30 RX ORDER — LISDEXAMFETAMINE DIMESYLATE CAPSULES 20 MG/1
20 CAPSULE ORAL EVERY MORNING
Qty: 30 CAPSULE | Refills: 0 | Status: SHIPPED | OUTPATIENT
Start: 2022-11-30 | End: 2022-12-01 | Stop reason: SDUPTHER

## 2022-11-30 NOTE — TELEPHONE ENCOUNTER
No new care gaps identified.  [facility] Embedded Care Gaps. Reference number: [id number]. 11/30/2022   1:58:22 PM CST

## 2022-12-01 ENCOUNTER — PATIENT MESSAGE (OUTPATIENT)
Dept: INTERNAL MEDICINE | Facility: CLINIC | Age: 32
End: 2022-12-01
Payer: COMMERCIAL

## 2022-12-01 RX ORDER — LISDEXAMFETAMINE DIMESYLATE CAPSULES 20 MG/1
20 CAPSULE ORAL EVERY MORNING
Qty: 30 CAPSULE | Refills: 0 | Status: SHIPPED | OUTPATIENT
Start: 2022-12-01 | End: 2023-01-13 | Stop reason: DRUGHIGH

## 2022-12-01 RX ORDER — LISDEXAMFETAMINE DIMESYLATE 20 MG/1
20 CAPSULE ORAL EVERY MORNING
Qty: 30 CAPSULE | Refills: 0 | Status: CANCELLED | OUTPATIENT
Start: 2022-12-01

## 2022-12-01 NOTE — TELEPHONE ENCOUNTER
No new care gaps identified.  [facility] Embedded Care Gaps. Reference number: [id number]. 12/01/2022   2:06:52 PM CST

## 2022-12-01 NOTE — TELEPHONE ENCOUNTER
No new care gaps identified.  [facility] Embedded Care Gaps. Reference number: [id number]. 12/01/2022   5:24:57 PM CST

## 2023-01-12 ENCOUNTER — PATIENT MESSAGE (OUTPATIENT)
Dept: INTERNAL MEDICINE | Facility: CLINIC | Age: 33
End: 2023-01-12
Payer: COMMERCIAL

## 2023-01-13 RX ORDER — LISDEXAMFETAMINE DIMESYLATE 10 MG/1
10 CAPSULE ORAL DAILY
Qty: 30 CAPSULE | Refills: 0 | Status: SHIPPED | OUTPATIENT
Start: 2023-01-13 | End: 2023-03-03 | Stop reason: SDUPTHER

## 2023-02-17 ENCOUNTER — OFFICE VISIT (OUTPATIENT)
Dept: URGENT CARE | Facility: CLINIC | Age: 33
End: 2023-02-17
Payer: COMMERCIAL

## 2023-02-17 VITALS
HEIGHT: 64 IN | BODY MASS INDEX: 21.68 KG/M2 | HEART RATE: 100 BPM | DIASTOLIC BLOOD PRESSURE: 75 MMHG | TEMPERATURE: 98 F | WEIGHT: 127 LBS | SYSTOLIC BLOOD PRESSURE: 117 MMHG | RESPIRATION RATE: 17 BRPM | OXYGEN SATURATION: 98 %

## 2023-02-17 DIAGNOSIS — R82.4 KETONURIA: ICD-10-CM

## 2023-02-17 DIAGNOSIS — S39.012A STRAIN OF LUMBAR REGION, INITIAL ENCOUNTER: ICD-10-CM

## 2023-02-17 DIAGNOSIS — M54.9 BACK PAIN, UNSPECIFIED BACK LOCATION, UNSPECIFIED BACK PAIN LATERALITY, UNSPECIFIED CHRONICITY: Primary | ICD-10-CM

## 2023-02-17 LAB
B-HCG UR QL: NEGATIVE
BILIRUB UR QL STRIP: NEGATIVE
CTP QC/QA: YES
GLUCOSE SERPL-MCNC: 122 MG/DL (ref 70–110)
GLUCOSE UR QL STRIP: NEGATIVE
KETONES UR QL STRIP: POSITIVE
LEUKOCYTE ESTERASE UR QL STRIP: NEGATIVE
PH, POC UA: 6 (ref 5–8)
POC BLOOD, URINE: NEGATIVE
POC NITRATES, URINE: NEGATIVE
PROT UR QL STRIP: NEGATIVE
SP GR UR STRIP: 1 (ref 1–1.03)
UROBILINOGEN UR STRIP-ACNC: NORMAL (ref 0.1–1.1)

## 2023-02-17 PROCEDURE — 1159F MED LIST DOCD IN RCRD: CPT | Mod: CPTII,S$GLB,, | Performed by: FAMILY MEDICINE

## 2023-02-17 PROCEDURE — 87086 URINE CULTURE/COLONY COUNT: CPT | Performed by: FAMILY MEDICINE

## 2023-02-17 PROCEDURE — 99000 PR SPECIMEN HANDLING,DR OFF->LAB: ICD-10-PCS | Mod: S$GLB,,, | Performed by: FAMILY MEDICINE

## 2023-02-17 PROCEDURE — 3008F BODY MASS INDEX DOCD: CPT | Mod: CPTII,S$GLB,, | Performed by: FAMILY MEDICINE

## 2023-02-17 PROCEDURE — 99214 OFFICE O/P EST MOD 30 MIN: CPT | Mod: S$GLB,,, | Performed by: FAMILY MEDICINE

## 2023-02-17 PROCEDURE — 3074F SYST BP LT 130 MM HG: CPT | Mod: CPTII,S$GLB,, | Performed by: FAMILY MEDICINE

## 2023-02-17 PROCEDURE — 1160F PR REVIEW ALL MEDS BY PRESCRIBER/CLIN PHARMACIST DOCUMENTED: ICD-10-PCS | Mod: CPTII,S$GLB,, | Performed by: FAMILY MEDICINE

## 2023-02-17 PROCEDURE — 3078F PR MOST RECENT DIASTOLIC BLOOD PRESSURE < 80 MM HG: ICD-10-PCS | Mod: CPTII,S$GLB,, | Performed by: FAMILY MEDICINE

## 2023-02-17 PROCEDURE — 3008F PR BODY MASS INDEX (BMI) DOCUMENTED: ICD-10-PCS | Mod: CPTII,S$GLB,, | Performed by: FAMILY MEDICINE

## 2023-02-17 PROCEDURE — 99214 PR OFFICE/OUTPT VISIT, EST, LEVL IV, 30-39 MIN: ICD-10-PCS | Mod: S$GLB,,, | Performed by: FAMILY MEDICINE

## 2023-02-17 PROCEDURE — 81025 POCT URINE PREGNANCY: ICD-10-PCS | Mod: S$GLB,,, | Performed by: FAMILY MEDICINE

## 2023-02-17 PROCEDURE — 3074F PR MOST RECENT SYSTOLIC BLOOD PRESSURE < 130 MM HG: ICD-10-PCS | Mod: CPTII,S$GLB,, | Performed by: FAMILY MEDICINE

## 2023-02-17 PROCEDURE — 81003 URINALYSIS AUTO W/O SCOPE: CPT | Mod: QW,S$GLB,, | Performed by: FAMILY MEDICINE

## 2023-02-17 PROCEDURE — 1159F PR MEDICATION LIST DOCUMENTED IN MEDICAL RECORD: ICD-10-PCS | Mod: CPTII,S$GLB,, | Performed by: FAMILY MEDICINE

## 2023-02-17 PROCEDURE — 82962 POCT GLUCOSE, HAND-HELD DEVICE: ICD-10-PCS | Mod: S$GLB,,, | Performed by: FAMILY MEDICINE

## 2023-02-17 PROCEDURE — 81003 POCT URINALYSIS, DIPSTICK, MANUAL, W/O SCOPE: ICD-10-PCS | Mod: QW,S$GLB,, | Performed by: FAMILY MEDICINE

## 2023-02-17 PROCEDURE — 99000 SPECIMEN HANDLING OFFICE-LAB: CPT | Mod: S$GLB,,, | Performed by: FAMILY MEDICINE

## 2023-02-17 PROCEDURE — 1160F RVW MEDS BY RX/DR IN RCRD: CPT | Mod: CPTII,S$GLB,, | Performed by: FAMILY MEDICINE

## 2023-02-17 PROCEDURE — 82962 GLUCOSE BLOOD TEST: CPT | Mod: S$GLB,,, | Performed by: FAMILY MEDICINE

## 2023-02-17 PROCEDURE — 81025 URINE PREGNANCY TEST: CPT | Mod: S$GLB,,, | Performed by: FAMILY MEDICINE

## 2023-02-17 PROCEDURE — 3078F DIAST BP <80 MM HG: CPT | Mod: CPTII,S$GLB,, | Performed by: FAMILY MEDICINE

## 2023-02-17 RX ORDER — METHOCARBAMOL 500 MG/1
500 TABLET, FILM COATED ORAL 3 TIMES DAILY
Qty: 20 TABLET | Refills: 0 | Status: SHIPPED | OUTPATIENT
Start: 2023-02-17 | End: 2023-02-24

## 2023-02-17 RX ORDER — NAPROXEN 375 MG/1
375 TABLET ORAL 2 TIMES DAILY
Qty: 14 TABLET | Refills: 0 | Status: SHIPPED | OUTPATIENT
Start: 2023-02-17 | End: 2023-02-24

## 2023-02-18 NOTE — PROGRESS NOTES
"Subjective:       Patient ID: Ada Ramirez is a 32 y.o. female.    Vitals:  height is 5' 4.02" (1.626 m) and weight is 57.6 kg (127 lb). Her oral temperature is 98.1 °F (36.7 °C). Her blood pressure is 117/75 and her pulse is 100. Her respiration is 17 and oxygen saturation is 98%.     Chief Complaint: Back Pain    32 y.o female presents today c/o lower back pain and nausea that started today.  States the pain is located to lower back, no radiation, intensity mild to moderate, currently mild.  States she was nauseated earlier but has resolved now.  Denies abdominal pain, dysuria, frequency, vomiting, diarrhea, constipation, fever, chills, weakness.  Patient reports history of chronic back issues and has been under care of for Orthopedics.  Patient states she has I habit of not drinking enough fluids, which made her concerned for a possible UTI.       Back Pain  This is a new problem. The current episode started today. The pain is at a severity of 8/10. The pain is moderate. Pertinent negatives include no abdominal pain, bladder incontinence, bowel incontinence, chest pain, dysuria, fever, headaches, leg pain, numbness, paresis, paresthesias, pelvic pain, perianal numbness, tingling, weakness or weight loss.     Constitution: Negative for fever.   Cardiovascular:  Negative for chest pain.   Gastrointestinal:  Negative for abdominal pain and bowel incontinence.   Genitourinary:  Negative for dysuria, bladder incontinence and pelvic pain.   Musculoskeletal:  Positive for back pain.   Neurological:  Negative for headaches and numbness.     Objective:      Physical Exam   Constitutional: She is oriented to person, place, and time. She appears well-developed. She is cooperative. No distress.   HENT:   Head: Normocephalic and atraumatic.   Ears:   Right Ear: Tympanic membrane, external ear and ear canal normal. impacted cerumen  Left Ear: Tympanic membrane, external ear and ear canal normal. impacted cerumen  Nose: Nose " normal. No rhinorrhea or congestion.   Mouth/Throat: Oropharynx is clear and moist and mucous membranes are normal. No oropharyngeal exudate or posterior oropharyngeal erythema.   Eyes: Conjunctivae and lids are normal.   Neck: Trachea normal and phonation normal. Neck supple.   Cardiovascular: Normal rate, regular rhythm, normal heart sounds and normal pulses.   No murmur heard.  Pulmonary/Chest: Effort normal and breath sounds normal. No stridor. No respiratory distress. She has no wheezes. She has no rhonchi. She has no rales.   Abdominal: Normal appearance and bowel sounds are normal. She exhibits no distension and no mass. Soft. There is no abdominal tenderness. There is no left CVA tenderness and no right CVA tenderness.   Musculoskeletal:         General: No swelling, deformity or signs of injury.      Cervical back: She exhibits no tenderness.      Right lower leg: No edema.      Left lower leg: No edema.      Comments:   Back exam:     +ve mild TTP to lumbar spine  No redness, swelling, bruise   ROM: at baseline  Lateral flexion cause mild low back pain   Forward flexion normal.  No sensory/motor deficit  SLR: WNL      Lymphadenopathy:     She has no cervical adenopathy.   Neurological: She is alert and oriented to person, place, and time. She has normal strength and normal reflexes. No sensory deficit.   Skin: Skin is warm, dry, intact and not diaphoretic.   Psychiatric: Her speech is normal and behavior is normal. Judgment and thought content normal.   Nursing note and vitals reviewed.      Assessment:       1. Back pain, unspecified back location, unspecified back pain laterality, unspecified chronicity    2. Ketonuria    3. Strain of lumbar region, initial encounter          Plan:     Ketone urea is possibly because of inadequate hydration as patient is not diabetic.  Patient has habit of not drinking enough fluids. Back pain is possibly a flare up of her chronic back problem.  Discussed  results/diagnosis/plan with patient in clinic.   Advised to drink plenty of fluids.  Back precautions advised.  Advised to f/u with PCP within 2-5 days. ER precautions given if symptoms get any worse. All questions answered. Patient verbally understood and agreed with treatment plan.     Back pain, unspecified back location, unspecified back pain laterality, unspecified chronicity  -     POCT urine pregnancy  -     POCT Urinalysis, Dipstick, Manual, w/o Scope  -     Urine culture    Ketonuria  -     POCT Glucose, Hand-Held Device    Strain of lumbar region, initial encounter    Other orders  -     naproxen (EC-NAPROSYN) 375 MG TbEC EC tablet; Take 1 tablet (375 mg total) by mouth 2 (two) times daily. for 7 days  Dispense: 14 tablet; Refill: 0  -     methocarbamoL (ROBAXIN) 500 MG Tab; Take 1 tablet (500 mg total) by mouth 3 (three) times daily. As needed for muscle spasm. May cause drowsiness for 7 days  Dispense: 20 tablet; Refill: 0

## 2023-02-19 LAB — BACTERIA UR CULT: NO GROWTH

## 2023-02-20 ENCOUNTER — TELEPHONE (OUTPATIENT)
Dept: URGENT CARE | Facility: CLINIC | Age: 33
End: 2023-02-20
Payer: COMMERCIAL

## 2023-02-24 ENCOUNTER — TELEPHONE (OUTPATIENT)
Dept: URGENT CARE | Facility: CLINIC | Age: 33
End: 2023-02-24
Payer: COMMERCIAL

## 2023-03-02 RX ORDER — LISDEXAMFETAMINE DIMESYLATE 10 MG/1
10 CAPSULE ORAL DAILY
Qty: 30 CAPSULE | Refills: 0 | Status: CANCELLED | OUTPATIENT
Start: 2023-03-02

## 2023-03-03 ENCOUNTER — OFFICE VISIT (OUTPATIENT)
Dept: INTERNAL MEDICINE | Facility: CLINIC | Age: 33
End: 2023-03-03
Payer: COMMERCIAL

## 2023-03-03 DIAGNOSIS — F90.0 ADHD (ATTENTION DEFICIT HYPERACTIVITY DISORDER), INATTENTIVE TYPE: Primary | ICD-10-CM

## 2023-03-03 PROCEDURE — 1160F RVW MEDS BY RX/DR IN RCRD: CPT | Mod: CPTII,95,, | Performed by: NURSE PRACTITIONER

## 2023-03-03 PROCEDURE — 1160F PR REVIEW ALL MEDS BY PRESCRIBER/CLIN PHARMACIST DOCUMENTED: ICD-10-PCS | Mod: CPTII,95,, | Performed by: NURSE PRACTITIONER

## 2023-03-03 PROCEDURE — 99212 OFFICE O/P EST SF 10 MIN: CPT | Mod: 95,,, | Performed by: NURSE PRACTITIONER

## 2023-03-03 PROCEDURE — 1159F PR MEDICATION LIST DOCUMENTED IN MEDICAL RECORD: ICD-10-PCS | Mod: CPTII,95,, | Performed by: NURSE PRACTITIONER

## 2023-03-03 PROCEDURE — 99212 PR OFFICE/OUTPT VISIT, EST, LEVL II, 10-19 MIN: ICD-10-PCS | Mod: 95,,, | Performed by: NURSE PRACTITIONER

## 2023-03-03 PROCEDURE — 1159F MED LIST DOCD IN RCRD: CPT | Mod: CPTII,95,, | Performed by: NURSE PRACTITIONER

## 2023-03-03 RX ORDER — MONTELUKAST SODIUM 10 MG/1
10 TABLET ORAL
COMMUNITY
Start: 2023-02-21

## 2023-03-03 RX ORDER — MILK THISTLE 150 MG
CAPSULE ORAL
COMMUNITY
End: 2023-04-05

## 2023-03-03 RX ORDER — EPINEPHRINE 0.3 MG/.3ML
INJECTION SUBCUTANEOUS
COMMUNITY
Start: 2023-01-25

## 2023-03-03 RX ORDER — LISDEXAMFETAMINE DIMESYLATE 10 MG/1
10 CAPSULE ORAL DAILY
Qty: 30 CAPSULE | Refills: 0 | Status: SHIPPED | OUTPATIENT
Start: 2023-03-03 | End: 2023-04-05 | Stop reason: SDUPTHER

## 2023-03-03 NOTE — PROGRESS NOTES
Established Patient - Audio Only Telehealth Visit     The patient location is: [place]   The chief complaint leading to consultation is: Medication Refill   Visit type: Virtual visit with audio only (telephone)  Total time spent with patient: 4       The reason for the audio only service rather than synchronous audio and video virtual visit was related to technical difficulties or patient preference/necessity.     Each patient to whom I provide medical services by telemedicine is:  (1) informed of the relationship between the physician and patient and the respective role of any other health care provider with respect to management of the patient; and (2) notified that they may decline to receive medical services by telemedicine and may withdraw from such care at any time. Patient verbally consented to receive this service via voice-only telephone call.       HPI:     Patient is a 32F on Vyvanse for ADHD. Notes she recently decreased to 10 mg from 20 mg because of weight loss. She is happy with the results of the 10 mg dosage. Finds her symptoms are controlled and she is able to maintain her weight. Requests refills at the 10 mg per day dosage.      Assessment and plan:      1. ADHD (attention deficit hyperactivity disorder), inattentive type    Chronic, stable on new dose, continue, follow up with PCP for annual.     - lisdexamfetamine (VYVANSE) 10 mg Cap; Take 1 capsule (10 mg) by mouth once daily.  Dispense: 30 capsule; Refill: 0           This service was not originating from a related E/M service provided within the previous 7 days nor will  to an E/M service or procedure within the next 24 hours or my soonest available appointment.  Prevailing standard of care was able to be met in this audio-only visit.

## 2023-03-03 NOTE — TELEPHONE ENCOUNTER
Care Due:                  Date            Visit Type   Department     Provider  --------------------------------------------------------------------------------                                ESTABLISHED                              PATIENT -    [facility] INTERNAL  Last Visit: 10-      [facility]                              MYCHART                              ANNUAL                              CHECKUP/PHY  [facility] INTERNAL  Next Visit: 04-      [facility]                                                            Last  Test          Frequency    Reason                     Performed    Due Date  --------------------------------------------------------------------------------    CBC.........  12 months..  valACYclovir.............  03-   03-    Cr..........  12 months..  valACYclovir, venlafaxine  03-   03-    [facility] Embedded Care Gaps. Reference number: [id number]. 3/02/2023   7:07:31 PM CST

## 2023-04-05 ENCOUNTER — OFFICE VISIT (OUTPATIENT)
Dept: INTERNAL MEDICINE | Facility: CLINIC | Age: 33
End: 2023-04-05
Payer: COMMERCIAL

## 2023-04-05 VITALS
OXYGEN SATURATION: 99 % | HEART RATE: 96 BPM | RESPIRATION RATE: 12 BRPM | WEIGHT: 128.75 LBS | BODY MASS INDEX: 22.09 KG/M2 | DIASTOLIC BLOOD PRESSURE: 58 MMHG | SYSTOLIC BLOOD PRESSURE: 102 MMHG | TEMPERATURE: 98 F

## 2023-04-05 DIAGNOSIS — Z00.00 ANNUAL PHYSICAL EXAM: Primary | ICD-10-CM

## 2023-04-05 DIAGNOSIS — F41.1 GAD (GENERALIZED ANXIETY DISORDER): ICD-10-CM

## 2023-04-05 DIAGNOSIS — F90.0 ADHD (ATTENTION DEFICIT HYPERACTIVITY DISORDER), INATTENTIVE TYPE: ICD-10-CM

## 2023-04-05 DIAGNOSIS — M24.9 HYPERMOBILE JOINTS: ICD-10-CM

## 2023-04-05 DIAGNOSIS — Q79.60 EDS (EHLERS-DANLOS SYNDROME): ICD-10-CM

## 2023-04-05 PROCEDURE — 3078F PR MOST RECENT DIASTOLIC BLOOD PRESSURE < 80 MM HG: ICD-10-PCS | Mod: CPTII,S$GLB,, | Performed by: HOSPITALIST

## 2023-04-05 PROCEDURE — 3074F SYST BP LT 130 MM HG: CPT | Mod: CPTII,S$GLB,, | Performed by: HOSPITALIST

## 2023-04-05 PROCEDURE — 1160F RVW MEDS BY RX/DR IN RCRD: CPT | Mod: CPTII,S$GLB,, | Performed by: HOSPITALIST

## 2023-04-05 PROCEDURE — 99999 PR PBB SHADOW E&M-EST. PATIENT-LVL IV: CPT | Mod: PBBFAC,,, | Performed by: HOSPITALIST

## 2023-04-05 PROCEDURE — 1160F PR REVIEW ALL MEDS BY PRESCRIBER/CLIN PHARMACIST DOCUMENTED: ICD-10-PCS | Mod: CPTII,S$GLB,, | Performed by: HOSPITALIST

## 2023-04-05 PROCEDURE — 3078F DIAST BP <80 MM HG: CPT | Mod: CPTII,S$GLB,, | Performed by: HOSPITALIST

## 2023-04-05 PROCEDURE — 99395 PR PREVENTIVE VISIT,EST,18-39: ICD-10-PCS | Mod: S$GLB,,, | Performed by: HOSPITALIST

## 2023-04-05 PROCEDURE — 3008F PR BODY MASS INDEX (BMI) DOCUMENTED: ICD-10-PCS | Mod: CPTII,S$GLB,, | Performed by: HOSPITALIST

## 2023-04-05 PROCEDURE — 99999 PR PBB SHADOW E&M-EST. PATIENT-LVL IV: ICD-10-PCS | Mod: PBBFAC,,, | Performed by: HOSPITALIST

## 2023-04-05 PROCEDURE — 3074F PR MOST RECENT SYSTOLIC BLOOD PRESSURE < 130 MM HG: ICD-10-PCS | Mod: CPTII,S$GLB,, | Performed by: HOSPITALIST

## 2023-04-05 PROCEDURE — 1159F MED LIST DOCD IN RCRD: CPT | Mod: CPTII,S$GLB,, | Performed by: HOSPITALIST

## 2023-04-05 PROCEDURE — 3008F BODY MASS INDEX DOCD: CPT | Mod: CPTII,S$GLB,, | Performed by: HOSPITALIST

## 2023-04-05 PROCEDURE — 99395 PREV VISIT EST AGE 18-39: CPT | Mod: S$GLB,,, | Performed by: HOSPITALIST

## 2023-04-05 PROCEDURE — 1159F PR MEDICATION LIST DOCUMENTED IN MEDICAL RECORD: ICD-10-PCS | Mod: CPTII,S$GLB,, | Performed by: HOSPITALIST

## 2023-04-05 RX ORDER — CHOLECALCIFEROL (VITAMIN D3) 25 MCG
1000 TABLET ORAL DAILY
COMMUNITY

## 2023-04-05 RX ORDER — LISDEXAMFETAMINE DIMESYLATE 10 MG/1
10 CAPSULE ORAL DAILY
Qty: 30 CAPSULE | Refills: 0 | Status: SHIPPED | OUTPATIENT
Start: 2023-04-05 | End: 2023-05-16 | Stop reason: SDUPTHER

## 2023-04-05 NOTE — TELEPHONE ENCOUNTER
Refill Routing Note   Medication(s) are not appropriate for processing by [facility] for the following reason(s):      Required labs outdated    [facility] action(s):  Defer            Appointments  past 12m or future 3m with PCP    Date Provider   Last Visit   10/21/2022 Molly Cunningham MD   Next Visit   4/5/2023 Molly Cunningham MD   ED visits in past 90 days: 0        Note composed:9:32 AM 04/05/2023

## 2023-04-05 NOTE — PROGRESS NOTES
Subjective:     @Patient ID: Ada Ramirez is a 32 y.o. female.    Chief Complaint: Annual Exam    HPI      33 yo F with ADHD, RYAN, hypermobility presents for annual:    1. ADHD: was on vyvanse 20 mg but lowered due to decreased appetite and weight loss. Reports doing well with 10 mg dose now. Weight has stabilized  2. RYAN: on effexor 37.5 mg qday and it has helped her mood  3. Hypermobility: managed by [facility]-hypermobility clinic. Reports recently dx with EDS. Also reports has referral to cardiology [facility] for evaluation of POTS  4. L breast mass: reports she saw her ob/gyn today at [facility]. States ob/gyn thinks likely benign but she does have upcoming breast ultrasound.       Lipid disorders/ASCVD risk (ages >/= 45 or >/= 20 if increased risk ): ordered  Cervical Cancer (Pap Smear ages 21-65 every 3 years or Pap + HPV q5 years after 30 years of age):  Dr Jeannie Marquez [facility]. Done 2019     Vaccines:   Influenza (yearly):    Tetanus (every 10 yrs - 1st tdap) done Tdap 7/2019  Covid19: reports utd with booster       Exercise: walking   Diet: regular       Review of Systems   Constitutional:  Negative for chills and fever.   HENT:  Negative for congestion and sore throat.    Eyes:  Negative for pain and visual disturbance.   Respiratory:  Negative for cough and shortness of breath.    Cardiovascular:  Negative for chest pain and leg swelling.   Gastrointestinal:  Negative for abdominal pain, nausea and vomiting.   Endocrine: Negative for polydipsia and polyuria.   Genitourinary:  Negative for difficulty urinating and dysuria.   Musculoskeletal:  Negative for arthralgias and back pain.   Skin:  Negative for rash and wound.   Neurological:  Negative for dizziness, weakness and headaches.   Psychiatric/Behavioral:  Negative for agitation and confusion.    Past medical history, surgical history, and family medical history reviewed and updated as appropriate.    Medications and allergies reviewed.     Objective:      There were no vitals filed for this visit.  There is no height or weight on file to calculate BMI.  Physical Exam  Vitals reviewed.   Constitutional:       General: She is not in acute distress.     Appearance: She is well-developed.   HENT:      Head: Normocephalic and atraumatic.      Right Ear: Tympanic membrane normal.      Left Ear: Tympanic membrane normal.      Mouth/Throat:      Mouth: Mucous membranes are moist.      Pharynx: No oropharyngeal exudate.   Eyes:      General:         Right eye: No discharge.         Left eye: No discharge.      Conjunctiva/sclera: Conjunctivae normal.   Cardiovascular:      Rate and Rhythm: Normal rate and regular rhythm.      Heart sounds: No murmur heard.    No friction rub.   Pulmonary:      Effort: Pulmonary effort is normal.      Breath sounds: Normal breath sounds.   Abdominal:      General: Bowel sounds are normal. There is no distension.      Palpations: Abdomen is soft.      Tenderness: There is no abdominal tenderness. There is no guarding.   Musculoskeletal:         General: Normal range of motion.      Cervical back: Normal range of motion and neck supple.      Right lower leg: No edema.      Left lower leg: No edema.   Lymphadenopathy:      Cervical: No cervical adenopathy.   Skin:     General: Skin is warm and dry.   Neurological:      Mental Status: She is alert and oriented to person, place, and time.   Psychiatric:         Mood and Affect: Mood normal.         Behavior: Behavior normal.       Lab Results   Component Value Date    WBC 4.18 03/15/2022    HGB 12.6 03/15/2022    HCT 38.7 03/15/2022     03/15/2022    CHOL 168 03/15/2022    TRIG 69 03/15/2022    HDL 74 03/15/2022    ALT 14 03/15/2022    AST 16 03/15/2022     03/15/2022    K 3.8 03/15/2022     03/15/2022    CREATININE 0.7 03/15/2022    BUN 9 03/15/2022    CO2 23 03/15/2022    TSH 1.907 03/15/2022    HGBA1C 4.7 07/16/2019       Assessment:     1. Annual physical exam    2. RYAN  (generalized anxiety disorder)    3. Hypermobile joints    4. EDS (Luis-Danlos syndrome)    5. ADHD (attention deficit hyperactivity disorder), inattentive type      Plan:   Ada was seen today for annual exam.    Diagnoses and all orders for this visit:    Annual physical exam  -     Comprehensive Metabolic Panel; Future  -     CBC Auto Differential; Future  -     Lipid Panel; Future  -     TSH; Future    RYAN (generalized anxiety disorder)  - Stable. Continue home meds     Hypermobile joints  EDS  - Stable. Continue f/u with o/s specialist       ADHD (attention deficit hyperactivity disorder), inattentive type  -     lisdexamfetamine (VYVANSE) 10 mg Cap; Take 10 mg by mouth once daily.       - I have reviewed the . Pt appears compliant with medication. Rx sent to pharmacy.            Molly Cunningham MD  Internal Medicine    4/5/2023

## 2023-04-05 NOTE — TELEPHONE ENCOUNTER
No new care gaps identified.  [facility] Embedded Care Gaps. Reference number: [id number]. 4/05/2023   3:38:02 AM BETTYT

## 2023-04-06 ENCOUNTER — PATIENT MESSAGE (OUTPATIENT)
Dept: INTERNAL MEDICINE | Facility: CLINIC | Age: 33
End: 2023-04-06
Payer: COMMERCIAL

## 2023-04-06 RX ORDER — VENLAFAXINE HYDROCHLORIDE 37.5 MG/1
CAPSULE, EXTENDED RELEASE ORAL
Qty: 90 CAPSULE | Refills: 3 | Status: SHIPPED | OUTPATIENT
Start: 2023-04-06

## 2023-04-15 ENCOUNTER — LAB VISIT (OUTPATIENT)
Dept: LAB | Facility: HOSPITAL | Age: 33
End: 2023-04-15
Attending: HOSPITALIST
Payer: COMMERCIAL

## 2023-04-15 DIAGNOSIS — Z00.00 ANNUAL PHYSICAL EXAM: ICD-10-CM

## 2023-04-15 LAB
ALBUMIN SERPL BCP-MCNC: 4.2 G/DL (ref 3.5–5.2)
ALP SERPL-CCNC: 68 U/L (ref 55–135)
ALT SERPL W/O P-5'-P-CCNC: 16 U/L (ref 10–44)
ANION GAP SERPL CALC-SCNC: 8 MMOL/L (ref 8–16)
AST SERPL-CCNC: 20 U/L (ref 10–40)
BASOPHILS # BLD AUTO: 0 K/UL (ref 0–0.2)
BASOPHILS NFR BLD: 0 % (ref 0–1.9)
BILIRUB SERPL-MCNC: 0.5 MG/DL (ref 0.1–1)
BUN SERPL-MCNC: 11 MG/DL (ref 6–20)
CALCIUM SERPL-MCNC: 9.6 MG/DL (ref 8.7–10.5)
CHLORIDE SERPL-SCNC: 107 MMOL/L (ref 95–110)
CHOLEST SERPL-MCNC: 167 MG/DL (ref 120–199)
CHOLEST/HDLC SERPL: 2.2 {RATIO} (ref 2–5)
CO2 SERPL-SCNC: 23 MMOL/L (ref 23–29)
CREAT SERPL-MCNC: 0.6 MG/DL (ref 0.5–1.4)
DIFFERENTIAL METHOD: ABNORMAL
EOSINOPHIL # BLD AUTO: 0.1 K/UL (ref 0–0.5)
EOSINOPHIL NFR BLD: 2.9 % (ref 0–8)
ERYTHROCYTE [DISTWIDTH] IN BLOOD BY AUTOMATED COUNT: 12.7 % (ref 11.5–14.5)
EST. GFR  (NO RACE VARIABLE): >60 ML/MIN/1.73 M^2
GLUCOSE SERPL-MCNC: 84 MG/DL (ref 70–110)
HCT VFR BLD AUTO: 37.8 % (ref 37–48.5)
HDLC SERPL-MCNC: 76 MG/DL (ref 40–75)
HDLC SERPL: 45.5 % (ref 20–50)
HGB BLD-MCNC: 11.7 G/DL (ref 12–16)
IMM GRANULOCYTES # BLD AUTO: 0.01 K/UL (ref 0–0.04)
IMM GRANULOCYTES NFR BLD AUTO: 0.2 % (ref 0–0.5)
LDLC SERPL CALC-MCNC: 81.2 MG/DL (ref 63–159)
LYMPHOCYTES # BLD AUTO: 1.1 K/UL (ref 1–4.8)
LYMPHOCYTES NFR BLD: 24.8 % (ref 18–48)
MCH RBC QN AUTO: 29.9 PG (ref 27–31)
MCHC RBC AUTO-ENTMCNC: 31 G/DL (ref 32–36)
MCV RBC AUTO: 97 FL (ref 82–98)
MONOCYTES # BLD AUTO: 0.7 K/UL (ref 0.3–1)
MONOCYTES NFR BLD: 15 % (ref 4–15)
NEUTROPHILS # BLD AUTO: 2.6 K/UL (ref 1.8–7.7)
NEUTROPHILS NFR BLD: 57.1 % (ref 38–73)
NONHDLC SERPL-MCNC: 91 MG/DL
NRBC BLD-RTO: 0 /100 WBC
PLATELET # BLD AUTO: 264 K/UL (ref 150–450)
PMV BLD AUTO: 11.3 FL (ref 9.2–12.9)
POTASSIUM SERPL-SCNC: 4.7 MMOL/L (ref 3.5–5.1)
PROT SERPL-MCNC: 6.7 G/DL (ref 6–8.4)
RBC # BLD AUTO: 3.91 M/UL (ref 4–5.4)
SODIUM SERPL-SCNC: 138 MMOL/L (ref 136–145)
TRIGL SERPL-MCNC: 49 MG/DL (ref 30–150)
TSH SERPL DL<=0.005 MIU/L-ACNC: 2.04 UIU/ML (ref 0.4–4)
WBC # BLD AUTO: 4.52 K/UL (ref 3.9–12.7)

## 2023-04-15 PROCEDURE — 80061 LIPID PANEL: CPT | Performed by: HOSPITALIST

## 2023-04-15 PROCEDURE — 85025 COMPLETE CBC W/AUTO DIFF WBC: CPT | Performed by: HOSPITALIST

## 2023-04-15 PROCEDURE — 84443 ASSAY THYROID STIM HORMONE: CPT | Performed by: HOSPITALIST

## 2023-04-15 PROCEDURE — 80053 COMPREHEN METABOLIC PANEL: CPT | Performed by: HOSPITALIST

## 2023-04-15 PROCEDURE — 36415 COLL VENOUS BLD VENIPUNCTURE: CPT | Mod: PO | Performed by: HOSPITALIST

## 2023-05-15 DIAGNOSIS — F90.0 ADHD (ATTENTION DEFICIT HYPERACTIVITY DISORDER), INATTENTIVE TYPE: ICD-10-CM

## 2023-05-15 RX ORDER — LISDEXAMFETAMINE DIMESYLATE 10 MG/1
10 CAPSULE ORAL DAILY
Qty: 30 CAPSULE | Refills: 0 | Status: CANCELLED | OUTPATIENT
Start: 2023-05-15

## 2023-05-15 NOTE — TELEPHONE ENCOUNTER
No care due was identified.  Health [facility] Embedded Care Due Messages. Reference number: [id number].   5/15/2023 8:50:45 AM CDT

## 2023-05-16 ENCOUNTER — PATIENT MESSAGE (OUTPATIENT)
Dept: INTERNAL MEDICINE | Facility: CLINIC | Age: 33
End: 2023-05-16
Payer: COMMERCIAL

## 2023-05-16 DIAGNOSIS — F90.0 ADHD (ATTENTION DEFICIT HYPERACTIVITY DISORDER), INATTENTIVE TYPE: ICD-10-CM

## 2023-05-16 RX ORDER — LISDEXAMFETAMINE DIMESYLATE 10 MG/1
10 CAPSULE ORAL DAILY
Qty: 30 CAPSULE | Refills: 0 | Status: SHIPPED | OUTPATIENT
Start: 2023-05-16 | End: 2023-06-30 | Stop reason: SDUPTHER

## 2023-05-16 NOTE — TELEPHONE ENCOUNTER
No care due was identified.  [facility] Embedded Care Due Messages. Reference number: [id number].   5/16/2023 1:08:28 PM CDT

## 2023-06-01 ENCOUNTER — PATIENT MESSAGE (OUTPATIENT)
Dept: INTERNAL MEDICINE | Facility: CLINIC | Age: 33
End: 2023-06-01
Payer: COMMERCIAL

## 2023-06-05 ENCOUNTER — OFFICE VISIT (OUTPATIENT)
Dept: INTERNAL MEDICINE | Facility: CLINIC | Age: 33
End: 2023-06-05
Payer: COMMERCIAL

## 2023-06-05 DIAGNOSIS — F41.1 GAD (GENERALIZED ANXIETY DISORDER): Primary | ICD-10-CM

## 2023-06-05 PROCEDURE — 1159F PR MEDICATION LIST DOCUMENTED IN MEDICAL RECORD: ICD-10-PCS | Mod: CPTII,95,, | Performed by: HOSPITALIST

## 2023-06-05 PROCEDURE — 1160F PR REVIEW ALL MEDS BY PRESCRIBER/CLIN PHARMACIST DOCUMENTED: ICD-10-PCS | Mod: CPTII,95,, | Performed by: HOSPITALIST

## 2023-06-05 PROCEDURE — 1160F RVW MEDS BY RX/DR IN RCRD: CPT | Mod: CPTII,95,, | Performed by: HOSPITALIST

## 2023-06-05 PROCEDURE — 99213 OFFICE O/P EST LOW 20 MIN: CPT | Mod: 95,,, | Performed by: HOSPITALIST

## 2023-06-05 PROCEDURE — 1159F MED LIST DOCD IN RCRD: CPT | Mod: CPTII,95,, | Performed by: HOSPITALIST

## 2023-06-05 PROCEDURE — 99213 PR OFFICE/OUTPT VISIT, EST, LEVL III, 20-29 MIN: ICD-10-PCS | Mod: 95,,, | Performed by: HOSPITALIST

## 2023-06-05 RX ORDER — ALPRAZOLAM 0.25 MG/1
0.25 TABLET ORAL DAILY PRN
Qty: 30 TABLET | Refills: 0 | Status: SHIPPED | OUTPATIENT
Start: 2023-06-05 | End: 2023-10-18 | Stop reason: SDUPTHER

## 2023-06-05 NOTE — PROGRESS NOTES
Virtual Visit  The patient location is: [place]   The chief complaint leading to consultation is: anxiety  Visit type: Virtual visit with synchronous audio and video  Total time spent with patient: 7 min  Each patient to whom he or she provides medical services by telemedicine is:  (1) informed of the relationship between the physician and patient and the respective role of any other health care provider with respect to management of the patient; and (2) notified that he or she may decline to receive medical services by telemedicine and may withdraw from such care at any time.    Subjective:         @Patient ID: Ada Ramirez is a 32 y.o. female.    Chief Complaint: Anxiety        31 yo F presents for f/u of anxiety. Pt reports that she has days where her anxiety is increased. Reports it does not happen frequently. Would like a medication to have prn. Currently on effexor daily    Also reports having a breast surgery coming up for a breast mass which has increased her anxiety.    Anxiety  Symptoms include nervous/anxious behavior.           Review of Systems   Constitutional:  Negative for chills and fever.   Psychiatric/Behavioral:  The patient is nervous/anxious.    Past medical history, surgical history, and family medical history reviewed and updated as appropriate.    Medications and allergies reviewed.     Objective:     There were no vitals filed for this visit.  There is no height or weight on file to calculate BMI.  Physical Exam  Constitutional:       Appearance: Normal appearance.   HENT:      Head: Normocephalic and atraumatic.   Pulmonary:      Effort: Pulmonary effort is normal.   Neurological:      Mental Status: She is alert and oriented to person, place, and time.   Psychiatric:         Mood and Affect: Mood normal.         Behavior: Behavior normal.       Lab Results   Component Value Date    WBC 4.52 04/15/2023    HGB 11.7 (L) 04/15/2023    HCT 37.8 04/15/2023     04/15/2023    CHOL 167  04/15/2023    TRIG 49 04/15/2023    HDL 76 (H) 04/15/2023    ALT 16 04/15/2023    AST 20 04/15/2023     04/15/2023    K 4.7 04/15/2023     04/15/2023    CREATININE 0.6 04/15/2023    BUN 11 04/15/2023    CO2 23 04/15/2023    TSH 2.038 04/15/2023    HGBA1C 4.7 07/16/2019       Assessment:     1. RYAN (generalized anxiety disorder)      Plan:   Diagnoses and all orders for this visit:    RYAN (generalized anxiety disorder)    Other orders  -     ALPRAZolam (XANAX) 0.25 MG tablet; Take 1 tablet (0.25 mg total) by mouth daily as needed for Anxiety.    Continue effexor  Start alprazolam prn. Counseled on possible sedation with medication. Not to take with etoh         Molly Cunningham MD  Internal Medicine    6/5/2023

## 2023-06-30 ENCOUNTER — PATIENT MESSAGE (OUTPATIENT)
Dept: INTERNAL MEDICINE | Facility: CLINIC | Age: 33
End: 2023-06-30
Payer: COMMERCIAL

## 2023-06-30 DIAGNOSIS — F90.0 ADHD (ATTENTION DEFICIT HYPERACTIVITY DISORDER), INATTENTIVE TYPE: ICD-10-CM

## 2023-06-30 NOTE — TELEPHONE ENCOUNTER
No care due was identified.  [facility] Embedded Care Due Messages. Reference number: [id number].   6/30/2023 1:43:48 PM CDT

## 2023-07-03 RX ORDER — LISDEXAMFETAMINE DIMESYLATE 10 MG/1
10 CAPSULE ORAL DAILY
Qty: 30 CAPSULE | Refills: 0 | Status: SHIPPED | OUTPATIENT
Start: 2023-07-03 | End: 2023-07-24 | Stop reason: SDUPTHER

## 2023-07-24 ENCOUNTER — OFFICE VISIT (OUTPATIENT)
Dept: INTERNAL MEDICINE | Facility: CLINIC | Age: 33
End: 2023-07-24
Payer: COMMERCIAL

## 2023-07-24 DIAGNOSIS — F90.0 ADHD (ATTENTION DEFICIT HYPERACTIVITY DISORDER), INATTENTIVE TYPE: Primary | ICD-10-CM

## 2023-07-24 PROCEDURE — 1160F PR REVIEW ALL MEDS BY PRESCRIBER/CLIN PHARMACIST DOCUMENTED: ICD-10-PCS | Mod: CPTII,95,, | Performed by: HOSPITALIST

## 2023-07-24 PROCEDURE — 1159F MED LIST DOCD IN RCRD: CPT | Mod: CPTII,95,, | Performed by: HOSPITALIST

## 2023-07-24 PROCEDURE — 1159F PR MEDICATION LIST DOCUMENTED IN MEDICAL RECORD: ICD-10-PCS | Mod: CPTII,95,, | Performed by: HOSPITALIST

## 2023-07-24 PROCEDURE — 1160F RVW MEDS BY RX/DR IN RCRD: CPT | Mod: CPTII,95,, | Performed by: HOSPITALIST

## 2023-07-24 PROCEDURE — 99213 OFFICE O/P EST LOW 20 MIN: CPT | Mod: 95,,, | Performed by: HOSPITALIST

## 2023-07-24 PROCEDURE — 99213 PR OFFICE/OUTPT VISIT, EST, LEVL III, 20-29 MIN: ICD-10-PCS | Mod: 95,,, | Performed by: HOSPITALIST

## 2023-07-24 RX ORDER — LISDEXAMFETAMINE DIMESYLATE 10 MG/1
10 CAPSULE ORAL DAILY
Qty: 30 CAPSULE | Refills: 0 | Status: SHIPPED | OUTPATIENT
Start: 2023-08-03 | End: 2023-10-04 | Stop reason: SDUPTHER

## 2023-07-24 NOTE — PROGRESS NOTES
Virtual Visit  The patient location is: [place]   The chief complaint leading to consultation is: adhd  Visit type: Virtual visit with synchronous audio and video  Total time spent with patient: 5 min  Each patient to whom he or she provides medical services by telemedicine is:  (1) informed of the relationship between the physician and patient and the respective role of any other health care provider with respect to management of the patient; and (2) notified that he or she may decline to receive medical services by telemedicine and may withdraw from such care at any time.    Subjective:         @Patient ID: Ada Ramirez is a 32 y.o. female.    Chief Complaint: ADHD    HPI    33 yo F presents for f/u of ADHD and medication refill.  Pt reports she is doing fine with vyvanse 10 mg qday since dose was decreased. No issues    Review of Systems   Constitutional:  Negative for chills and fever.   HENT:  Negative for congestion and sore throat.    Eyes:  Negative for pain and visual disturbance.   Respiratory:  Negative for cough and shortness of breath.    Cardiovascular:  Negative for chest pain and leg swelling.   Gastrointestinal:  Negative for abdominal pain, nausea and vomiting.   Endocrine: Negative for polydipsia and polyuria.   Genitourinary:  Negative for difficulty urinating and dysuria.   Musculoskeletal:  Negative for arthralgias and back pain.   Skin:  Negative for rash and wound.   Neurological:  Negative for dizziness, weakness and headaches.   Psychiatric/Behavioral:  Negative for agitation and confusion.    Past medical history, surgical history, and family medical history reviewed and updated as appropriate.    Medications and allergies reviewed.     Objective:     There were no vitals filed for this visit.  There is no height or weight on file to calculate BMI.  Physical Exam  Constitutional:       Appearance: Normal appearance.   HENT:      Head: Normocephalic and atraumatic.   Pulmonary:       Effort: Pulmonary effort is normal.   Neurological:      Mental Status: She is alert and oriented to person, place, and time.   Psychiatric:         Mood and Affect: Mood normal.         Behavior: Behavior normal.       Lab Results   Component Value Date    WBC 4.52 04/15/2023    HGB 11.7 (L) 04/15/2023    HCT 37.8 04/15/2023     04/15/2023    CHOL 167 04/15/2023    TRIG 49 04/15/2023    HDL 76 (H) 04/15/2023    ALT 16 04/15/2023    AST 20 04/15/2023     04/15/2023    K 4.7 04/15/2023     04/15/2023    CREATININE 0.6 04/15/2023    BUN 11 04/15/2023    CO2 23 04/15/2023    TSH 2.038 04/15/2023    HGBA1C 4.7 07/16/2019       Assessment:     1. ADHD (attention deficit hyperactivity disorder), inattentive type      Plan:   Diagnoses and all orders for this visit:    ADHD (attention deficit hyperactivity disorder), inattentive type  -     lisdexamfetamine (VYVANSE) 10 mg Cap; Take 1 capsule (10 mg) by mouth once daily.    I have reviewed the . Pt appears compliant with medication. Rx sent to pharmacy.         No follow-ups on file.    Molly Cunningham MD  Internal Medicine    7/24/2023

## 2023-08-17 ENCOUNTER — TELEPHONE (OUTPATIENT)
Dept: PHARMACY | Facility: CLINIC | Age: 33
End: 2023-08-17
Payer: COMMERCIAL

## 2023-08-17 NOTE — TELEPHONE ENCOUNTER
PA APPROVED;    The prior authorization for Ada James's Vyvanse prescription has been APPROVED FROM 8/16/2023 TO 8/15/2024 with copayment of $15.

## 2023-10-04 DIAGNOSIS — F90.0 ADHD (ATTENTION DEFICIT HYPERACTIVITY DISORDER), INATTENTIVE TYPE: ICD-10-CM

## 2023-10-04 NOTE — TELEPHONE ENCOUNTER
No care due was identified.  [facility] Embedded Care Due Messages. Reference number: [id number].   10/04/2023 9:26:40 AM CDT

## 2023-10-05 RX ORDER — LISDEXAMFETAMINE DIMESYLATE CAPSULES 10 MG/1
10 CAPSULE ORAL DAILY
Qty: 30 CAPSULE | Refills: 0 | Status: SHIPPED | OUTPATIENT
Start: 2023-10-05 | End: 2023-11-13 | Stop reason: SDUPTHER

## 2023-10-18 RX ORDER — ALPRAZOLAM 0.25 MG/1
0.25 TABLET ORAL DAILY PRN
Qty: 30 TABLET | Refills: 0 | Status: SHIPPED | OUTPATIENT
Start: 2023-10-18

## 2023-10-18 NOTE — TELEPHONE ENCOUNTER
No care due was identified.  [facility] Embedded Care Due Messages. Reference number: [id number].   10/18/2023 11:18:10 AM CDT

## 2023-11-01 ENCOUNTER — PATIENT MESSAGE (OUTPATIENT)
Dept: INTERNAL MEDICINE | Facility: CLINIC | Age: 33
End: 2023-11-01

## 2023-11-13 ENCOUNTER — OFFICE VISIT (OUTPATIENT)
Dept: INTERNAL MEDICINE | Facility: CLINIC | Age: 33
End: 2023-11-13
Payer: COMMERCIAL

## 2023-11-13 DIAGNOSIS — F90.0 ADHD (ATTENTION DEFICIT HYPERACTIVITY DISORDER), INATTENTIVE TYPE: Primary | ICD-10-CM

## 2023-11-13 PROCEDURE — 1160F PR REVIEW ALL MEDS BY PRESCRIBER/CLIN PHARMACIST DOCUMENTED: ICD-10-PCS | Mod: CPTII,95,, | Performed by: HOSPITALIST

## 2023-11-13 PROCEDURE — 1160F RVW MEDS BY RX/DR IN RCRD: CPT | Mod: CPTII,95,, | Performed by: HOSPITALIST

## 2023-11-13 PROCEDURE — 1159F MED LIST DOCD IN RCRD: CPT | Mod: CPTII,95,, | Performed by: HOSPITALIST

## 2023-11-13 PROCEDURE — 99213 OFFICE O/P EST LOW 20 MIN: CPT | Mod: 95,,, | Performed by: HOSPITALIST

## 2023-11-13 PROCEDURE — 1159F PR MEDICATION LIST DOCUMENTED IN MEDICAL RECORD: ICD-10-PCS | Mod: CPTII,95,, | Performed by: HOSPITALIST

## 2023-11-13 PROCEDURE — 99213 PR OFFICE/OUTPT VISIT, EST, LEVL III, 20-29 MIN: ICD-10-PCS | Mod: 95,,, | Performed by: HOSPITALIST

## 2023-11-13 RX ORDER — LISDEXAMFETAMINE DIMESYLATE CAPSULES 10 MG/1
10 CAPSULE ORAL DAILY
Qty: 30 CAPSULE | Refills: 0 | Status: SHIPPED | OUTPATIENT
Start: 2023-11-13 | End: 2024-01-15 | Stop reason: SDUPTHER

## 2023-11-13 NOTE — PROGRESS NOTES
Virtual Visit  The patient location is: [place]   The chief complaint leading to consultation is: adhd  Visit type: Virtual visit with synchronous audio and video  Total time spent with patient: 5 min  Each patient to whom he or she provides medical services by telemedicine is:  (1) informed of the relationship between the physician and patient and the respective role of any other health care provider with respect to management of the patient; and (2) notified that he or she may decline to receive medical services by telemedicine and may withdraw from such care at any time.    Subjective:         @Patient ID: Ada Ramirez is a 33 y.o. female.    Chief Complaint: No chief complaint on file.    HPI    34 yo F presents for f/u of ADHD and medication refill.  Pt reports no issues with vyvanse 10 mg qday    Review of Systems   Constitutional:  Negative for chills and fever.   HENT:  Negative for congestion and sore throat.    Eyes:  Negative for pain and visual disturbance.   Respiratory:  Negative for cough and shortness of breath.    Cardiovascular:  Negative for chest pain and leg swelling.   Gastrointestinal:  Negative for abdominal pain, nausea and vomiting.   Endocrine: Negative for polydipsia and polyuria.   Genitourinary:  Negative for difficulty urinating and dysuria.   Musculoskeletal:  Negative for arthralgias and back pain.   Skin:  Negative for rash and wound.   Neurological:  Negative for dizziness, weakness and headaches.   Psychiatric/Behavioral:  Negative for agitation and confusion.      Past medical history, surgical history, and family medical history reviewed and updated as appropriate.    Medications and allergies reviewed.     Objective:     There were no vitals filed for this visit.  There is no height or weight on file to calculate BMI.  Physical Exam  Constitutional:       Appearance: Normal appearance.   HENT:      Head: Normocephalic and atraumatic.   Pulmonary:      Effort: Pulmonary  effort is normal.   Neurological:      Mental Status: She is alert and oriented to person, place, and time.   Psychiatric:         Mood and Affect: Mood normal.         Behavior: Behavior normal.         Lab Results   Component Value Date    WBC 4.52 04/15/2023    HGB 11.7 (L) 04/15/2023    HCT 37.8 04/15/2023     04/15/2023    CHOL 167 04/15/2023    TRIG 49 04/15/2023    HDL 76 (H) 04/15/2023    ALT 16 04/15/2023    AST 20 04/15/2023     04/15/2023    K 4.7 04/15/2023     04/15/2023    CREATININE 0.6 04/15/2023    BUN 11 04/15/2023    CO2 23 04/15/2023    TSH 2.038 04/15/2023    HGBA1C 4.7 07/16/2019       Assessment:     1. ADHD (attention deficit hyperactivity disorder), inattentive type      Plan:   Diagnoses and all orders for this visit:    ADHD (attention deficit hyperactivity disorder), inattentive type  -     lisdexamfetamine (VYVANSE) 10 mg Cap; Take 1 capsule (10 mg) by mouth once daily.    I have reviewed the . Pt appears compliant with medication. Rx sent to pharmacy.       Rtc 3 months     Molly Cunningham MD  Internal Medicine    11/13/2023

## 2024-01-15 DIAGNOSIS — F90.0 ADHD (ATTENTION DEFICIT HYPERACTIVITY DISORDER), INATTENTIVE TYPE: ICD-10-CM

## 2024-01-15 NOTE — TELEPHONE ENCOUNTER
Care Due:                  Date            Visit Type   Department     Provider  --------------------------------------------------------------------------------                                ESTABLISHED                              PATIENT -    [facility] INTERNAL  Last Visit: 11-      [facility]       Molly  Azani  Next Visit: None Scheduled  None         None Found                                                            Last  Test          Frequency    Reason                     Performed    Due Date  --------------------------------------------------------------------------------    CBC.........  12 months..  valACYclovir.............  04-   04-    Cr..........  12 months..  valACYclovir, venlafaxine  04-   04-    Health [facility] Embedded Care Due Messages. Reference number: [id number].   1/15/2024 9:34:33 AM CST

## 2024-01-16 RX ORDER — LISDEXAMFETAMINE DIMESYLATE CAPSULES 10 MG/1
10 CAPSULE ORAL DAILY
Qty: 30 CAPSULE | Refills: 0 | Status: SHIPPED | OUTPATIENT
Start: 2024-01-16 | End: 2024-03-04 | Stop reason: SDUPTHER

## 2024-02-28 DIAGNOSIS — F90.0 ADHD (ATTENTION DEFICIT HYPERACTIVITY DISORDER), INATTENTIVE TYPE: ICD-10-CM

## 2024-02-28 RX ORDER — LISDEXAMFETAMINE DIMESYLATE CAPSULES 10 MG/1
10 CAPSULE ORAL DAILY
Qty: 30 CAPSULE | Refills: 0 | OUTPATIENT
Start: 2024-02-28

## 2024-02-28 NOTE — TELEPHONE ENCOUNTER
No care due was identified.  Health [facility] Embedded Care Due Messages. Reference number: [id number].   2/28/2024 10:34:57 AM CST

## 2024-03-04 ENCOUNTER — OFFICE VISIT (OUTPATIENT)
Dept: INTERNAL MEDICINE | Facility: CLINIC | Age: 34
End: 2024-03-04
Payer: COMMERCIAL

## 2024-03-04 VITALS
HEIGHT: 64 IN | DIASTOLIC BLOOD PRESSURE: 60 MMHG | OXYGEN SATURATION: 99 % | RESPIRATION RATE: 17 BRPM | TEMPERATURE: 98 F | WEIGHT: 127.44 LBS | HEART RATE: 106 BPM | BODY MASS INDEX: 21.76 KG/M2 | SYSTOLIC BLOOD PRESSURE: 100 MMHG

## 2024-03-04 DIAGNOSIS — G90.1 DYSAUTONOMIA: ICD-10-CM

## 2024-03-04 DIAGNOSIS — Z23 NEED FOR VACCINATION: ICD-10-CM

## 2024-03-04 DIAGNOSIS — G89.29 CHRONIC LOW BACK PAIN WITHOUT SCIATICA, UNSPECIFIED BACK PAIN LATERALITY: ICD-10-CM

## 2024-03-04 DIAGNOSIS — Q79.60 EDS (EHLERS-DANLOS SYNDROME): ICD-10-CM

## 2024-03-04 DIAGNOSIS — M54.50 CHRONIC LOW BACK PAIN WITHOUT SCIATICA, UNSPECIFIED BACK PAIN LATERALITY: ICD-10-CM

## 2024-03-04 DIAGNOSIS — Z00.00 ANNUAL PHYSICAL EXAM: Primary | ICD-10-CM

## 2024-03-04 DIAGNOSIS — F90.0 ADHD (ATTENTION DEFICIT HYPERACTIVITY DISORDER), INATTENTIVE TYPE: ICD-10-CM

## 2024-03-04 PROCEDURE — 90471 IMMUNIZATION ADMIN: CPT | Mod: S$GLB,,, | Performed by: HOSPITALIST

## 2024-03-04 PROCEDURE — 99395 PREV VISIT EST AGE 18-39: CPT | Mod: 25,S$GLB,, | Performed by: HOSPITALIST

## 2024-03-04 PROCEDURE — 3074F SYST BP LT 130 MM HG: CPT | Mod: CPTII,S$GLB,, | Performed by: HOSPITALIST

## 2024-03-04 PROCEDURE — 90686 IIV4 VACC NO PRSV 0.5 ML IM: CPT | Mod: S$GLB,,, | Performed by: HOSPITALIST

## 2024-03-04 PROCEDURE — 99999 PR PBB SHADOW E&M-EST. PATIENT-LVL III: CPT | Mod: PBBFAC,,, | Performed by: HOSPITALIST

## 2024-03-04 PROCEDURE — 1160F RVW MEDS BY RX/DR IN RCRD: CPT | Mod: CPTII,S$GLB,, | Performed by: HOSPITALIST

## 2024-03-04 PROCEDURE — 3008F BODY MASS INDEX DOCD: CPT | Mod: CPTII,S$GLB,, | Performed by: HOSPITALIST

## 2024-03-04 PROCEDURE — 1159F MED LIST DOCD IN RCRD: CPT | Mod: CPTII,S$GLB,, | Performed by: HOSPITALIST

## 2024-03-04 PROCEDURE — 3078F DIAST BP <80 MM HG: CPT | Mod: CPTII,S$GLB,, | Performed by: HOSPITALIST

## 2024-03-04 RX ORDER — LISDEXAMFETAMINE DIMESYLATE CAPSULES 10 MG/1
10 CAPSULE ORAL DAILY
Qty: 30 CAPSULE | Refills: 0 | Status: SHIPPED | OUTPATIENT
Start: 2024-03-04 | End: 2024-03-05 | Stop reason: SDUPTHER

## 2024-03-04 RX ORDER — LISDEXAMFETAMINE DIMESYLATE CAPSULES 10 MG/1
10 CAPSULE ORAL DAILY
Qty: 30 CAPSULE | Refills: 0 | Status: CANCELLED | OUTPATIENT
Start: 2024-03-04

## 2024-03-04 NOTE — PROGRESS NOTES
Subjective:     @Patient ID: Ada Ramirez is a 33 y.o. female.    Chief Complaint: No chief complaint on file.    HPI       32 yo F with ADHD, RYAN, hypermobility presents for annual:     1. ADHD: on Vyvanse 10 mg qday.  Was on vyvanse 20 mg but lowered due to decreased appetite and weight loss. Weight has stabilized  2. YRAN: on effexor 37.5 mg qday, alprazolam prn   3. Hypermobility: managed by [facility]-hypermobility clinic.  4. Chronic low back pain: reports currently in PT however reports it can flare her dysautonomia. Would like to proceed with aquatic therapy         Lipid disorders/ASCVD risk (ages >/= 45 or >/= 20 if increased risk ): ordered  Cervical Cancer (Pap Smear ages 21-65 every 3 years or Pap + HPV q5 years after 30 years of age):  Dr Jeannie Marquez [facility]. Done 2019     Vaccines:   Influenza (yearly):  due   Tetanus (every 10 yrs - 1st tdap) done Tdap 7/2019  Covid19: due for booster       Exercise: walking   Diet: regular        Review of Systems   Constitutional:  Negative for chills and fever.   HENT:  Negative for congestion and sore throat.    Eyes:  Negative for pain and visual disturbance.   Respiratory:  Negative for cough and shortness of breath.    Cardiovascular:  Negative for chest pain and leg swelling.   Gastrointestinal:  Negative for abdominal pain, nausea and vomiting.   Endocrine: Negative for polydipsia and polyuria.   Genitourinary:  Negative for difficulty urinating and dysuria.   Musculoskeletal:  Positive for back pain. Negative for arthralgias.   Skin:  Negative for rash and wound.   Neurological:  Negative for dizziness, weakness and headaches.   Psychiatric/Behavioral:  Negative for agitation and confusion.      Past medical history, surgical history, and family medical history reviewed and updated as appropriate.    Medications and allergies reviewed.     Objective:     There were no vitals filed for this visit.  There is no height or weight on file to calculate  BMI.  Physical Exam  Vitals reviewed.   Constitutional:       General: She is not in acute distress.     Appearance: She is well-developed.   HENT:      Head: Normocephalic and atraumatic.      Right Ear: Tympanic membrane normal.      Left Ear: Tympanic membrane normal.      Mouth/Throat:      Mouth: Mucous membranes are moist.      Pharynx: No oropharyngeal exudate.   Eyes:      General:         Right eye: No discharge.         Left eye: No discharge.      Conjunctiva/sclera: Conjunctivae normal.   Cardiovascular:      Rate and Rhythm: Normal rate and regular rhythm.      Heart sounds: No murmur heard.     No friction rub.   Pulmonary:      Effort: Pulmonary effort is normal.      Breath sounds: Normal breath sounds.   Abdominal:      General: Bowel sounds are normal. There is no distension.      Palpations: Abdomen is soft.      Tenderness: There is no abdominal tenderness. There is no guarding.   Musculoskeletal:      Cervical back: Normal range of motion and neck supple.      Right lower leg: No edema.      Left lower leg: No edema.   Lymphadenopathy:      Cervical: No cervical adenopathy.   Skin:     General: Skin is warm and dry.   Neurological:      Mental Status: She is alert and oriented to person, place, and time.   Psychiatric:         Mood and Affect: Mood normal.         Behavior: Behavior normal.         Lab Results   Component Value Date    WBC 4.52 04/15/2023    HGB 11.7 (L) 04/15/2023    HCT 37.8 04/15/2023     04/15/2023    CHOL 167 04/15/2023    TRIG 49 04/15/2023    HDL 76 (H) 04/15/2023    ALT 16 04/15/2023    AST 20 04/15/2023     04/15/2023    K 4.7 04/15/2023     04/15/2023    CREATININE 0.6 04/15/2023    BUN 11 04/15/2023    CO2 23 04/15/2023    TSH 2.038 04/15/2023    HGBA1C 4.7 07/16/2019       Assessment:     1. Annual physical exam    2. ADHD (attention deficit hyperactivity disorder), inattentive type    3. EDS (Luis-Danlos syndrome)    4. Chronic low back pain  without sciatica, unspecified back pain laterality    5. Dysautonomia    6. Need for vaccination      Plan:   Ada was seen today for annual exam.    Diagnoses and all orders for this visit:    Annual physical exam  -     Comprehensive Metabolic Panel; Future  -     CBC Auto Differential; Future  -     Lipid Panel; Future  -     TSH; Future    ADHD (attention deficit hyperactivity disorder), inattentive type  -     Comprehensive Metabolic Panel; Future  -     CBC Auto Differential; Future  -     Lipid Panel; Future  -     TSH; Future  -     lisdexamfetamine (VYVANSE) 10 mg Cap; Take 1 capsule (10 mg) by mouth once daily.    EDS (Luis-Danlos syndrome)  -     Comprehensive Metabolic Panel; Future  -     CBC Auto Differential; Future  -     Lipid Panel; Future  -     TSH; Future  -     Ambulatory referral/consult to Physical/Occupational Therapy; Future    Chronic low back pain without sciatica, unspecified back pain laterality  -     Ambulatory referral/consult to Physical/Occupational Therapy; Future    Dysautonomia  - stable. Continue to monitor     Need for vaccination  -     Influenza - Quadrivalent (PF)             Rtc 3 months     Molly Cunningham MD  Internal Medicine    3/4/2024

## 2024-03-05 ENCOUNTER — LAB VISIT (OUTPATIENT)
Dept: LAB | Facility: HOSPITAL | Age: 34
End: 2024-03-05
Attending: HOSPITALIST
Payer: COMMERCIAL

## 2024-03-05 ENCOUNTER — PATIENT MESSAGE (OUTPATIENT)
Dept: INTERNAL MEDICINE | Facility: CLINIC | Age: 34
End: 2024-03-05
Payer: COMMERCIAL

## 2024-03-05 DIAGNOSIS — Q79.60 EDS (EHLERS-DANLOS SYNDROME): ICD-10-CM

## 2024-03-05 DIAGNOSIS — F90.0 ADHD (ATTENTION DEFICIT HYPERACTIVITY DISORDER), INATTENTIVE TYPE: ICD-10-CM

## 2024-03-05 DIAGNOSIS — Z00.00 ANNUAL PHYSICAL EXAM: ICD-10-CM

## 2024-03-05 LAB
ALBUMIN SERPL BCP-MCNC: 4.1 G/DL (ref 3.5–5.2)
ALP SERPL-CCNC: 68 U/L (ref 55–135)
ALT SERPL W/O P-5'-P-CCNC: 11 U/L (ref 10–44)
ANION GAP SERPL CALC-SCNC: 7 MMOL/L (ref 8–16)
AST SERPL-CCNC: 14 U/L (ref 10–40)
BASOPHILS # BLD AUTO: 0.01 K/UL (ref 0–0.2)
BASOPHILS NFR BLD: 0.2 % (ref 0–1.9)
BILIRUB SERPL-MCNC: 0.5 MG/DL (ref 0.1–1)
BUN SERPL-MCNC: 10 MG/DL (ref 6–20)
CALCIUM SERPL-MCNC: 9.4 MG/DL (ref 8.7–10.5)
CHLORIDE SERPL-SCNC: 107 MMOL/L (ref 95–110)
CHOLEST SERPL-MCNC: 164 MG/DL (ref 120–199)
CHOLEST/HDLC SERPL: 2.6 {RATIO} (ref 2–5)
CO2 SERPL-SCNC: 26 MMOL/L (ref 23–29)
CREAT SERPL-MCNC: 0.6 MG/DL (ref 0.5–1.4)
DIFFERENTIAL METHOD BLD: ABNORMAL
EOSINOPHIL # BLD AUTO: 0.1 K/UL (ref 0–0.5)
EOSINOPHIL NFR BLD: 2.1 % (ref 0–8)
ERYTHROCYTE [DISTWIDTH] IN BLOOD BY AUTOMATED COUNT: 12.6 % (ref 11.5–14.5)
EST. GFR  (NO RACE VARIABLE): >60 ML/MIN/1.73 M^2
GLUCOSE SERPL-MCNC: 83 MG/DL (ref 70–110)
HCT VFR BLD AUTO: 39.2 % (ref 37–48.5)
HDLC SERPL-MCNC: 64 MG/DL (ref 40–75)
HDLC SERPL: 39 % (ref 20–50)
HGB BLD-MCNC: 12.5 G/DL (ref 12–16)
IMM GRANULOCYTES # BLD AUTO: 0.01 K/UL (ref 0–0.04)
IMM GRANULOCYTES NFR BLD AUTO: 0.2 % (ref 0–0.5)
LDLC SERPL CALC-MCNC: 84 MG/DL (ref 63–159)
LYMPHOCYTES # BLD AUTO: 0.6 K/UL (ref 1–4.8)
LYMPHOCYTES NFR BLD: 10.7 % (ref 18–48)
MCH RBC QN AUTO: 30.8 PG (ref 27–31)
MCHC RBC AUTO-ENTMCNC: 31.9 G/DL (ref 32–36)
MCV RBC AUTO: 97 FL (ref 82–98)
MONOCYTES # BLD AUTO: 0.7 K/UL (ref 0.3–1)
MONOCYTES NFR BLD: 12.2 % (ref 4–15)
NEUTROPHILS # BLD AUTO: 4 K/UL (ref 1.8–7.7)
NEUTROPHILS NFR BLD: 74.6 % (ref 38–73)
NONHDLC SERPL-MCNC: 100 MG/DL
NRBC BLD-RTO: 0 /100 WBC
PLATELET # BLD AUTO: 257 K/UL (ref 150–450)
PMV BLD AUTO: 11.1 FL (ref 9.2–12.9)
POTASSIUM SERPL-SCNC: 4.3 MMOL/L (ref 3.5–5.1)
PROT SERPL-MCNC: 6.7 G/DL (ref 6–8.4)
RBC # BLD AUTO: 4.06 M/UL (ref 4–5.4)
SODIUM SERPL-SCNC: 140 MMOL/L (ref 136–145)
TRIGL SERPL-MCNC: 80 MG/DL (ref 30–150)
TSH SERPL DL<=0.005 MIU/L-ACNC: 1.43 UIU/ML (ref 0.4–4)
WBC # BLD AUTO: 5.31 K/UL (ref 3.9–12.7)

## 2024-03-05 PROCEDURE — 80061 LIPID PANEL: CPT | Performed by: HOSPITALIST

## 2024-03-05 PROCEDURE — 85025 COMPLETE CBC W/AUTO DIFF WBC: CPT | Performed by: HOSPITALIST

## 2024-03-05 PROCEDURE — 36415 COLL VENOUS BLD VENIPUNCTURE: CPT | Performed by: HOSPITALIST

## 2024-03-05 PROCEDURE — 80053 COMPREHEN METABOLIC PANEL: CPT | Performed by: HOSPITALIST

## 2024-03-05 PROCEDURE — 84443 ASSAY THYROID STIM HORMONE: CPT | Performed by: HOSPITALIST

## 2024-03-05 RX ORDER — LISDEXAMFETAMINE DIMESYLATE CAPSULES 10 MG/1
10 CAPSULE ORAL DAILY
Qty: 30 CAPSULE | Refills: 0 | Status: SHIPPED | OUTPATIENT
Start: 2024-03-05 | End: 2024-05-15 | Stop reason: SDUPTHER

## 2024-03-05 NOTE — TELEPHONE ENCOUNTER
No care due was identified.  [facility] Embedded Care Due Messages. Reference number: [id number].   3/05/2024 11:03:15 AM CST

## 2024-04-19 RX ORDER — VENLAFAXINE HYDROCHLORIDE 37.5 MG/1
CAPSULE, EXTENDED RELEASE ORAL
Qty: 90 CAPSULE | Refills: 3 | Status: SHIPPED | OUTPATIENT
Start: 2024-04-19

## 2024-04-19 NOTE — TELEPHONE ENCOUNTER
No care due was identified.  [facility] Embedded Care Due Messages. Reference number: [id number].   4/19/2024 8:37:04 AM CDT

## 2024-04-20 NOTE — TELEPHONE ENCOUNTER
Refill Decision Note   Ada James  is requesting a refill authorization.  Brief Assessment and Rationale for Refill:  Approve     Medication Therapy Plan:         Comments:     Note composed:11:53 PM 04/19/2024

## 2024-05-15 DIAGNOSIS — F90.0 ADHD (ATTENTION DEFICIT HYPERACTIVITY DISORDER), INATTENTIVE TYPE: ICD-10-CM

## 2024-05-15 RX ORDER — LISDEXAMFETAMINE DIMESYLATE CAPSULES 10 MG/1
10 CAPSULE ORAL DAILY
Qty: 30 CAPSULE | Refills: 0 | Status: SHIPPED | OUTPATIENT
Start: 2024-05-15

## 2024-05-15 NOTE — TELEPHONE ENCOUNTER
No care due was identified.  [facility] Embedded Care Due Messages. Reference number: [id number].   5/15/2024 1:20:41 PM CDT

## 2024-06-04 ENCOUNTER — OFFICE VISIT (OUTPATIENT)
Dept: INTERNAL MEDICINE | Facility: CLINIC | Age: 34
End: 2024-06-04
Payer: COMMERCIAL

## 2024-06-04 DIAGNOSIS — F90.0 ADHD (ATTENTION DEFICIT HYPERACTIVITY DISORDER), INATTENTIVE TYPE: Primary | ICD-10-CM

## 2024-06-04 PROCEDURE — 1160F RVW MEDS BY RX/DR IN RCRD: CPT | Mod: CPTII,95,, | Performed by: HOSPITALIST

## 2024-06-04 PROCEDURE — 99213 OFFICE O/P EST LOW 20 MIN: CPT | Mod: 95,,, | Performed by: HOSPITALIST

## 2024-06-04 PROCEDURE — 1159F MED LIST DOCD IN RCRD: CPT | Mod: CPTII,95,, | Performed by: HOSPITALIST

## 2024-06-04 PROCEDURE — G2211 COMPLEX E/M VISIT ADD ON: HCPCS | Mod: 95,,, | Performed by: HOSPITALIST

## 2024-06-04 NOTE — PROGRESS NOTES
Virtual Visit  The patient location is: [place]   The chief complaint leading to consultation is: adhd  Visit type: Virtual visit with synchronous audio and video  Total time spent with patient: 5 min   Each patient to whom he or she provides medical services by telemedicine is:  (1) informed of the relationship between the physician and patient and the respective role of any other health care provider with respect to management of the patient; and (2) notified that he or she may decline to receive medical services by telemedicine and may withdraw from such care at any time.    Subjective:         @Patient ID: Ada Ramirez is a 33 y.o. female.    Chief Complaint: ADHD    HPI    34 yo F with ADHD, RYAN, hypermobility presents for f/u:      1. ADHD: on Vyvanse 10 mg qday. Pt plans to taper down frequency of use   2. RYAN: on effexor 37.5 mg qday, alprazolam prn . May consider tapering of effexor in the future  3. Hypermobility: managed by [facility]-hypermobility clinic. Reports no longer able to go the clinic as they do not take insurance     Review of Systems   Constitutional:  Negative for chills and fever.   HENT:  Negative for congestion and sore throat.    Eyes:  Negative for pain and visual disturbance.   Respiratory:  Negative for cough and shortness of breath.    Cardiovascular:  Negative for chest pain and leg swelling.   Gastrointestinal:  Negative for abdominal pain, nausea and vomiting.   Endocrine: Negative for polydipsia and polyuria.   Genitourinary:  Negative for difficulty urinating and dysuria.   Musculoskeletal:  Negative for arthralgias and back pain.   Skin:  Negative for rash and wound.   Neurological:  Negative for dizziness, weakness and headaches.   Psychiatric/Behavioral:  Negative for agitation and confusion.      Past medical history, surgical history, and family medical history reviewed and updated as appropriate.    Medications and allergies reviewed.     Objective:     There were no  vitals filed for this visit.  There is no height or weight on file to calculate BMI.  Physical Exam  Constitutional:       Appearance: Normal appearance.   HENT:      Head: Normocephalic and atraumatic.   Pulmonary:      Effort: Pulmonary effort is normal.   Neurological:      Mental Status: She is alert and oriented to person, place, and time.   Psychiatric:         Mood and Affect: Mood normal.         Behavior: Behavior normal.         Lab Results   Component Value Date    WBC 5.31 03/05/2024    HGB 12.5 03/05/2024    HCT 39.2 03/05/2024     03/05/2024    CHOL 164 03/05/2024    TRIG 80 03/05/2024    HDL 64 03/05/2024    ALT 11 03/05/2024    AST 14 03/05/2024     03/05/2024    K 4.3 03/05/2024     03/05/2024    CREATININE 0.6 03/05/2024    BUN 10 03/05/2024    CO2 26 03/05/2024    TSH 1.430 03/05/2024    HGBA1C 4.7 07/16/2019       Assessment:     1. ADHD (attention deficit hyperactivity disorder), inattentive type      Plan:   Ada was seen today for adhd.    Diagnoses and all orders for this visit:    ADHD (attention deficit hyperactivity disorder), inattentive type      I have reviewed the . Pt appears compliant with vyvanse medication. Rx will be sent to pharmacy at pt's next request       Rtc 3 months and prn     Molly Cunningham MD  Internal Medicine    6/4/2024

## 2024-08-01 DIAGNOSIS — F90.0 ADHD (ATTENTION DEFICIT HYPERACTIVITY DISORDER), INATTENTIVE TYPE: ICD-10-CM

## 2024-08-01 RX ORDER — LISDEXAMFETAMINE DIMESYLATE CAPSULES 10 MG/1
10 CAPSULE ORAL DAILY
Qty: 30 CAPSULE | Refills: 0 | Status: SHIPPED | OUTPATIENT
Start: 2024-08-01

## 2024-08-01 NOTE — TELEPHONE ENCOUNTER
No care due was identified.  [facility] Embedded Care Due Messages. Reference number: [id number].   8/01/2024 11:49:40 AM CDT  
TEXF-05/15/24, LOV-06/04/24  
No

## 2024-10-01 ENCOUNTER — PATIENT MESSAGE (OUTPATIENT)
Dept: INTERNAL MEDICINE | Facility: CLINIC | Age: 34
End: 2024-10-01
Payer: COMMERCIAL

## 2024-10-06 ENCOUNTER — PATIENT MESSAGE (OUTPATIENT)
Dept: INTERNAL MEDICINE | Facility: CLINIC | Age: 34
End: 2024-10-06
Payer: COMMERCIAL

## 2024-10-07 NOTE — TELEPHONE ENCOUNTER
The patient is wanting to wean off of Effexor.  Please provide regimen.  See below    Per patient; the patient is wanting to take less medication.  Wanting to stop the Effexor and remain on Vyvanse.  Please advise      .

## 2024-10-28 ENCOUNTER — OFFICE VISIT (OUTPATIENT)
Dept: INTERNAL MEDICINE | Facility: CLINIC | Age: 34
End: 2024-10-28
Payer: COMMERCIAL

## 2024-10-28 DIAGNOSIS — F41.1 GAD (GENERALIZED ANXIETY DISORDER): Primary | ICD-10-CM

## 2024-10-28 PROCEDURE — 99213 OFFICE O/P EST LOW 20 MIN: CPT | Mod: 95,,, | Performed by: NURSE PRACTITIONER

## 2024-10-28 PROCEDURE — 1159F MED LIST DOCD IN RCRD: CPT | Mod: CPTII,95,, | Performed by: NURSE PRACTITIONER

## 2024-10-28 PROCEDURE — 1160F RVW MEDS BY RX/DR IN RCRD: CPT | Mod: CPTII,95,, | Performed by: NURSE PRACTITIONER

## 2024-11-10 DIAGNOSIS — F90.0 ADHD (ATTENTION DEFICIT HYPERACTIVITY DISORDER), INATTENTIVE TYPE: ICD-10-CM

## 2024-11-11 RX ORDER — LISDEXAMFETAMINE DIMESYLATE 10 MG/1
10 CAPSULE ORAL DAILY
Qty: 30 CAPSULE | Refills: 0 | Status: SHIPPED | OUTPATIENT
Start: 2024-11-11

## 2024-11-11 NOTE — TELEPHONE ENCOUNTER
No care due was identified.  [facility] Embedded Care Due Messages. Reference number: [id number].   11/10/2024 9:35:50 PM CST

## 2025-01-05 DIAGNOSIS — F90.0 ADHD (ATTENTION DEFICIT HYPERACTIVITY DISORDER), INATTENTIVE TYPE: ICD-10-CM

## 2025-01-06 NOTE — TELEPHONE ENCOUNTER
Care Due:                  Date            Visit Type   Department     Provider  --------------------------------------------------------------------------------                                ESTABLISHED                              PATIENT -    [facility] INTERNAL  Last Visit: 06-      [facility]       Molly  Azani  Next Visit: None Scheduled  None         None Found                                                            Last  Test          Frequency    Reason                     Performed    Due Date  --------------------------------------------------------------------------------    Cr..........  12 months..  venlafaxine..............  03- 02-    [facility] Embedded Care Due Messages. Reference number: [id number].   1/05/2025 10:17:52 PM CST

## 2025-01-08 RX ORDER — LISDEXAMFETAMINE DIMESYLATE 10 MG/1
10 CAPSULE ORAL DAILY
Qty: 30 CAPSULE | Refills: 0 | Status: SHIPPED | OUTPATIENT
Start: 2025-01-08

## 2025-03-07 DIAGNOSIS — F90.0 ADHD (ATTENTION DEFICIT HYPERACTIVITY DISORDER), INATTENTIVE TYPE: ICD-10-CM

## 2025-03-07 RX ORDER — LISDEXAMFETAMINE DIMESYLATE 10 MG/1
10 CAPSULE ORAL DAILY
Qty: 30 CAPSULE | Refills: 0 | Status: SHIPPED | OUTPATIENT
Start: 2025-03-07

## 2025-03-07 NOTE — TELEPHONE ENCOUNTER
Care Due:                  Date            Visit Type   Department     Provider  --------------------------------------------------------------------------------                                ESTABLISHED                              PATIENT -    [facility] INTERNAL  Last Visit: 06-      [facility]                              [facility]                              ANNUAL                              CHECKUP/PHY  [facility] INTERNAL  Next Visit: 03-      [facility]                                                            Last  Test          Frequency    Reason                     Performed    Due Date  --------------------------------------------------------------------------------    Cr..........  12 months..  venlafaxine..............  03- 02-    [facility] Embedded Care Due Messages. Reference number: [id number].   3/07/2025 11:39:31 AM CST

## 2025-03-12 ENCOUNTER — OFFICE VISIT (OUTPATIENT)
Dept: INTERNAL MEDICINE | Facility: CLINIC | Age: 35
End: 2025-03-12
Payer: COMMERCIAL

## 2025-03-12 ENCOUNTER — LAB VISIT (OUTPATIENT)
Dept: LAB | Facility: HOSPITAL | Age: 35
End: 2025-03-12
Attending: HOSPITALIST
Payer: COMMERCIAL

## 2025-03-12 VITALS
DIASTOLIC BLOOD PRESSURE: 78 MMHG | SYSTOLIC BLOOD PRESSURE: 104 MMHG | TEMPERATURE: 98 F | RESPIRATION RATE: 18 BRPM | HEIGHT: 64 IN | OXYGEN SATURATION: 96 % | WEIGHT: 127 LBS | HEART RATE: 91 BPM | BODY MASS INDEX: 21.68 KG/M2

## 2025-03-12 DIAGNOSIS — Z00.00 ANNUAL PHYSICAL EXAM: ICD-10-CM

## 2025-03-12 DIAGNOSIS — F90.0 ADHD (ATTENTION DEFICIT HYPERACTIVITY DISORDER), INATTENTIVE TYPE: ICD-10-CM

## 2025-03-12 DIAGNOSIS — Z23 NEED FOR VACCINATION: ICD-10-CM

## 2025-03-12 DIAGNOSIS — G90.1 DYSAUTONOMIA: ICD-10-CM

## 2025-03-12 DIAGNOSIS — Q79.60 EDS (EHLERS-DANLOS SYNDROME): ICD-10-CM

## 2025-03-12 DIAGNOSIS — M54.50 CHRONIC LOW BACK PAIN WITHOUT SCIATICA, UNSPECIFIED BACK PAIN LATERALITY: ICD-10-CM

## 2025-03-12 DIAGNOSIS — Z00.00 ANNUAL PHYSICAL EXAM: Primary | ICD-10-CM

## 2025-03-12 DIAGNOSIS — G89.29 CHRONIC LOW BACK PAIN WITHOUT SCIATICA, UNSPECIFIED BACK PAIN LATERALITY: ICD-10-CM

## 2025-03-12 LAB
ALBUMIN SERPL BCP-MCNC: 4.6 G/DL (ref 3.5–5.2)
ALP SERPL-CCNC: 57 U/L (ref 40–150)
ALT SERPL W/O P-5'-P-CCNC: 12 U/L (ref 10–44)
ANION GAP SERPL CALC-SCNC: 13 MMOL/L (ref 8–16)
AST SERPL-CCNC: 17 U/L (ref 10–40)
BASOPHILS # BLD AUTO: 0.06 K/UL (ref 0–0.2)
BASOPHILS NFR BLD: 1.1 % (ref 0–1.9)
BILIRUB SERPL-MCNC: 0.5 MG/DL (ref 0.1–1)
BUN SERPL-MCNC: 10 MG/DL (ref 6–20)
CALCIUM SERPL-MCNC: 9.3 MG/DL (ref 8.7–10.5)
CHLORIDE SERPL-SCNC: 106 MMOL/L (ref 95–110)
CHOLEST SERPL-MCNC: 165 MG/DL (ref 120–199)
CHOLEST/HDLC SERPL: 2.4 {RATIO} (ref 2–5)
CO2 SERPL-SCNC: 19 MMOL/L (ref 23–29)
CREAT SERPL-MCNC: 0.7 MG/DL (ref 0.5–1.4)
DIFFERENTIAL METHOD BLD: ABNORMAL
EOSINOPHIL # BLD AUTO: 0.1 K/UL (ref 0–0.5)
EOSINOPHIL NFR BLD: 1.5 % (ref 0–8)
ERYTHROCYTE [DISTWIDTH] IN BLOOD BY AUTOMATED COUNT: 12.4 % (ref 11.5–14.5)
EST. GFR  (NO RACE VARIABLE): >60 ML/MIN/1.73 M^2
GLUCOSE SERPL-MCNC: 85 MG/DL (ref 70–110)
HCT VFR BLD AUTO: 39.3 % (ref 37–48.5)
HDLC SERPL-MCNC: 68 MG/DL (ref 40–75)
HDLC SERPL: 41.2 % (ref 20–50)
HGB BLD-MCNC: 12.9 G/DL (ref 12–16)
IMM GRANULOCYTES # BLD AUTO: 0.02 K/UL (ref 0–0.04)
IMM GRANULOCYTES NFR BLD AUTO: 0.4 % (ref 0–0.5)
LDLC SERPL CALC-MCNC: 88.8 MG/DL (ref 63–159)
LYMPHOCYTES # BLD AUTO: 1.1 K/UL (ref 1–4.8)
LYMPHOCYTES NFR BLD: 21.6 % (ref 18–48)
MCH RBC QN AUTO: 31.4 PG (ref 27–31)
MCHC RBC AUTO-ENTMCNC: 32.8 G/DL (ref 32–36)
MCV RBC AUTO: 96 FL (ref 82–98)
MONOCYTES # BLD AUTO: 0.7 K/UL (ref 0.3–1)
MONOCYTES NFR BLD: 12.3 % (ref 4–15)
NEUTROPHILS # BLD AUTO: 3.3 K/UL (ref 1.8–7.7)
NEUTROPHILS NFR BLD: 63.1 % (ref 38–73)
NONHDLC SERPL-MCNC: 97 MG/DL
NRBC BLD-RTO: 0 /100 WBC
PLATELET # BLD AUTO: 320 K/UL (ref 150–450)
PMV BLD AUTO: 11.6 FL (ref 9.2–12.9)
POTASSIUM SERPL-SCNC: 4.1 MMOL/L (ref 3.5–5.1)
PROT SERPL-MCNC: 7.4 G/DL (ref 6–8.4)
RBC # BLD AUTO: 4.11 M/UL (ref 4–5.4)
SODIUM SERPL-SCNC: 138 MMOL/L (ref 136–145)
TRIGL SERPL-MCNC: 41 MG/DL (ref 30–150)
TSH SERPL DL<=0.005 MIU/L-ACNC: 0.71 UIU/ML (ref 0.4–4)
WBC # BLD AUTO: 5.28 K/UL (ref 3.9–12.7)

## 2025-03-12 PROCEDURE — 99395 PREV VISIT EST AGE 18-39: CPT | Mod: 25,S$GLB,, | Performed by: HOSPITALIST

## 2025-03-12 PROCEDURE — 80061 LIPID PANEL: CPT | Performed by: HOSPITALIST

## 2025-03-12 PROCEDURE — 3078F DIAST BP <80 MM HG: CPT | Mod: CPTII,S$GLB,, | Performed by: HOSPITALIST

## 2025-03-12 PROCEDURE — 80053 COMPREHEN METABOLIC PANEL: CPT | Performed by: HOSPITALIST

## 2025-03-12 PROCEDURE — 85025 COMPLETE CBC W/AUTO DIFF WBC: CPT | Performed by: HOSPITALIST

## 2025-03-12 PROCEDURE — 3074F SYST BP LT 130 MM HG: CPT | Mod: CPTII,S$GLB,, | Performed by: HOSPITALIST

## 2025-03-12 PROCEDURE — 1160F RVW MEDS BY RX/DR IN RCRD: CPT | Mod: CPTII,S$GLB,, | Performed by: HOSPITALIST

## 2025-03-12 PROCEDURE — 1159F MED LIST DOCD IN RCRD: CPT | Mod: CPTII,S$GLB,, | Performed by: HOSPITALIST

## 2025-03-12 PROCEDURE — 90656 IIV3 VACC NO PRSV 0.5 ML IM: CPT | Mod: S$GLB,,, | Performed by: HOSPITALIST

## 2025-03-12 PROCEDURE — 84443 ASSAY THYROID STIM HORMONE: CPT | Performed by: HOSPITALIST

## 2025-03-12 PROCEDURE — 36415 COLL VENOUS BLD VENIPUNCTURE: CPT | Mod: PO | Performed by: HOSPITALIST

## 2025-03-12 PROCEDURE — 3008F BODY MASS INDEX DOCD: CPT | Mod: CPTII,S$GLB,, | Performed by: HOSPITALIST

## 2025-03-12 PROCEDURE — 99999 PR PBB SHADOW E&M-EST. PATIENT-LVL IV: CPT | Mod: PBBFAC,,, | Performed by: HOSPITALIST

## 2025-03-12 PROCEDURE — 90471 IMMUNIZATION ADMIN: CPT | Mod: S$GLB,,, | Performed by: HOSPITALIST

## 2025-03-12 NOTE — PROGRESS NOTES
Subjective:     @Patient ID: Ada Ramirez is a 34 y.o. female.    Chief Complaint: Annual Exam    HPI    35 yo F with ADHD, RYAN, hypermobility, dysautomnia presents for annual:     1. ADHD: on Vyvanse 10 mg qday.  Was on vyvanse 20 mg but lowered due to decreased appetite and weight loss. Weight has stabilized  2. RYAN: alprazolam prn. No longer on effexor. Reports sometimes feels heart racing when anxious   3. Hypermobility: was managed by [facility]-hypermobility clinic.  4. Chronic low back pain: was y in PT however reports it can flare her dysautonomia.          Lipid disorders/ASCVD risk (ages >/= 45 or >/= 20 if increased risk ): ordered  Cervical Cancer (Pap Smear ages 21-65 every 3 years or Pap + HPV q5 years after 30 years of age):  Dr Jeannie Marquez [facility]. Done       Vaccines:   Influenza (yearly):  due   Tetanus (every 10 yrs - 1st tdap) done Tdap 7/2019  Covid19: due for booster       Exercise: walking   Diet: regular        Review of Systems   Constitutional:  Negative for activity change and unexpected weight change.   HENT:  Negative for hearing loss, rhinorrhea and trouble swallowing.    Eyes:  Positive for visual disturbance. Negative for discharge.   Respiratory:  Negative for chest tightness and wheezing.    Cardiovascular:  Positive for palpitations. Negative for chest pain.   Gastrointestinal:  Negative for blood in stool, constipation, diarrhea and vomiting.   Endocrine: Negative for polydipsia and polyuria.   Genitourinary:  Negative for difficulty urinating, dysuria, hematuria and menstrual problem.   Musculoskeletal:  Positive for arthralgias and neck pain. Negative for joint swelling.   Neurological:  Negative for weakness and headaches.   Psychiatric/Behavioral:  Negative for confusion and dysphoric mood.      Past medical history, surgical history, and family medical history reviewed and updated as appropriate.    Medications and allergies reviewed.     Objective:     Vitals:     "03/12/25 1504   BP: 104/78   BP Location: Right arm   Patient Position: Sitting   Pulse: 91   Resp: 18   Temp: 98.4 °F (36.9 °C)   TempSrc: Temporal   SpO2: 96%   Weight: 57.6 kg (126 lb 15.8 oz)   Height: 5' 4" (1.626 m)     Body mass index is 21.8 kg/m².  Physical Exam  Vitals reviewed.   Constitutional:       General: She is not in acute distress.     Appearance: She is well-developed.   HENT:      Head: Normocephalic and atraumatic.      Right Ear: Tympanic membrane normal.      Left Ear: Tympanic membrane normal.      Mouth/Throat:      Mouth: Mucous membranes are moist.      Pharynx: No oropharyngeal exudate.   Eyes:      General:         Right eye: No discharge.         Left eye: No discharge.      Conjunctiva/sclera: Conjunctivae normal.   Cardiovascular:      Rate and Rhythm: Normal rate and regular rhythm.      Heart sounds: No murmur heard.     No friction rub.   Pulmonary:      Effort: Pulmonary effort is normal.      Breath sounds: Normal breath sounds.   Abdominal:      General: Bowel sounds are normal. There is no distension.      Palpations: Abdomen is soft.      Tenderness: There is no abdominal tenderness. There is no guarding.   Musculoskeletal:         General: Normal range of motion.      Cervical back: Normal range of motion and neck supple.      Right lower leg: No edema.      Left lower leg: No edema.   Lymphadenopathy:      Cervical: No cervical adenopathy.   Skin:     General: Skin is warm and dry.   Neurological:      Mental Status: She is alert and oriented to person, place, and time.   Psychiatric:         Mood and Affect: Mood normal.         Behavior: Behavior normal.         Lab Results   Component Value Date    WBC 5.31 03/05/2024    HGB 12.5 03/05/2024    HCT 39.2 03/05/2024     03/05/2024    CHOL 164 03/05/2024    TRIG 80 03/05/2024    HDL 64 03/05/2024    ALT 11 03/05/2024    AST 14 03/05/2024     03/05/2024    K 4.3 03/05/2024     03/05/2024    CREATININE 0.6 " 03/05/2024    BUN 10 03/05/2024    CO2 26 03/05/2024    TSH 1.430 03/05/2024    HGBA1C 4.7 07/16/2019       Assessment:     1. Annual physical exam    2. ADHD (attention deficit hyperactivity disorder), inattentive type    3. Dysautonomia    4. EDS (Luis-Danlos syndrome)    5. Chronic low back pain without sciatica, unspecified back pain laterality    6. Need for vaccination      Plan:   Ada was seen today for annual exam.    Diagnoses and all orders for this visit:    Annual physical exam  -     Comprehensive Metabolic Panel; Future  -     CBC Auto Differential; Future  -     Lipid Panel; Future  -     TSH; Future    ADHD (attention deficit hyperactivity disorder), inattentive type  -     Comprehensive Metabolic Panel; Future  -     CBC Auto Differential; Future  -     Lipid Panel; Future  -     TSH; Future    Dysautonomia  -     Comprehensive Metabolic Panel; Future  -     CBC Auto Differential; Future  -     Lipid Panel; Future  -     TSH; Future    EDS (Luis-Danlos syndrome)  -     Ambulatory Referral/Consult to Physical/Occupational Therapy; Future    Chronic low back pain without sciatica, unspecified back pain laterality  -     Ambulatory Referral/Consult to Physical/Occupational Therapy; Future    Need for vaccination  -     Influenza - Trivalent - PF (ADULT)          Molly Cunningham MD  Internal Medicine    3/12/2025

## 2025-03-13 ENCOUNTER — RESULTS FOLLOW-UP (OUTPATIENT)
Dept: INTERNAL MEDICINE | Facility: CLINIC | Age: 35
End: 2025-03-13

## 2025-03-19 ENCOUNTER — CLINICAL SUPPORT (OUTPATIENT)
Dept: REHABILITATION | Facility: HOSPITAL | Age: 35
End: 2025-03-19
Payer: COMMERCIAL

## 2025-03-19 DIAGNOSIS — Q79.60 EDS (EHLERS-DANLOS SYNDROME): ICD-10-CM

## 2025-03-19 DIAGNOSIS — R29.898 WEAKNESS OF BOTH HIPS: Primary | ICD-10-CM

## 2025-03-19 DIAGNOSIS — G89.29 CHRONIC LOW BACK PAIN WITHOUT SCIATICA, UNSPECIFIED BACK PAIN LATERALITY: ICD-10-CM

## 2025-03-19 DIAGNOSIS — M54.50 CHRONIC LOW BACK PAIN WITHOUT SCIATICA, UNSPECIFIED BACK PAIN LATERALITY: ICD-10-CM

## 2025-03-19 PROCEDURE — 97110 THERAPEUTIC EXERCISES: CPT

## 2025-03-19 PROCEDURE — 97161 PT EVAL LOW COMPLEX 20 MIN: CPT

## 2025-03-19 NOTE — PROGRESS NOTES
Outpatient Rehab    Physical Therapy Evaluation    Patient Name: Ada Ramirez  MRN: [id number]  YOB: 1990  Encounter Date: 3/19/2025    Therapy Diagnosis:   Encounter Diagnoses   Name Primary?    EDS (Luis-Danlos syndrome)     Chronic low back pain without sciatica, unspecified back pain laterality     Weakness of both hips Yes     Physician: Molly Cunningham MD    Physician Orders: Eval and Treat  Medical Diagnosis: EDS (Luis-Danlos syndrome)  Chronic low back pain without sciatica, unspecified back pain laterality    Visit # / Visits Authorized:  1 / 1  Date of Evaluation: 3/19/2025  Insurance Authorization Period: 3/12/2025 to 12/31/2025  Plan of Care Certification:  3/19/2025 to 5/16/2025      PT/PTA:     Number of PTA visits since last PT visit:   Time In: 0805   Time Out: 0855  Total Time: 50 minutes  Total Billable Time: 50 minutes     Intake Outcome Measure for FOTO Survey    Therapist reviewed FOTO scores for Ada Ramirez on 3/19/2025.   FOTO report - see Media section or FOTO account episode details.     Intake Score: 51%    Precautions     Dysautonmia, EDS (Luis-Danlos syndrome)      Subjective   History of Present Illness  Ada is a 34 y.o. female who presents to physical therapy with reports that she was diagnosed with EDS (Luis-Danlos syndrome) in 2021. She also reports she has mild scoliosis. She was also diagnosed with dysautonomia. She reports chronic low back pain and bilateral hip pain. She describes pain as achy and sharp. She denies LE radicular symptoms. Aggravating factors include prolonged sitting, bending, and lifting. She attended physical therapy about a year ago and states it was helpful.         Pain     Patient reports a current pain level of 2/10. Pain at best is reported as 2/10. Pain at worst is reported as 8/10.   Location: low back, bilateral hips  Pain Qualities: Aching, Sharp         Employment  Employment Status: Employed full-time   Desk job     Past  Medical History/Physical Systems Review:   Ada Ramirez  has a past medical history of Allergy.    Ada Ramirez  has a past surgical history that includes Tonsillectomy and Adenoidectomy.    Ada has a current medication list which includes the following prescription(s): alprazolam, epinephrine, fexofenadine, lisdexamfetamine, and UNABLE TO FIND.    Review of patient's allergies indicates:   Allergen Reactions    Cephalosporins Rash    Penicillins Rash    Sulfa (sulfonamide antibiotics) Rash    Latex, natural rubber Dermatitis and Rash      Objective          Posture: protracted shoulders, increased thoracic kyphosis in sitting    Lumbar Range of Motion:    % Limitation Pain   Flexion No loss           Extension 25% loss           Left Side Bending No loss         Right Side Bending No loss         Left rotation   No loss         Right Rotation   No loss            Hip Range of Motion:   Right  Left Goal   Hip Extension NT NT 30 deg.   Hip External Rotation (hip flexed to 90) 55 deg 55 deg 45 deg.   Hip Internal Rotation (hip flexed to 90) 50 deg 50 deg 45 deg.   Hip Flexion WNL  deg.     Lower Extremity Strength  Right LE  Left LE  Goal   Knee extension: 5/5 Knee extension: 5/5 5/5   Knee flexion: 5/5 Knee flexion: 5/5 5/5   Hip flexion: 5/5 Hip flexion: 5/5 5/5   Hip extension:  4/5 Hip extension: 4/5 5/5   Hip abduction: 4/5 Hip abduction: 4/5 5/5   Hip IR 4+/5 Hip adduction 4+/5 5/5   ER 4+/5 Ankle dorsiflexion: 4+/5 5/5       Special Tests:   Right Left   RANJIT Negative Negative   FADIR Negative Negative   SCOUR Negative Negative       Neuro Dynamic Testing:    Sciatic nerve:      SLR: R = Negative     L = Negative     Joint Mobility: NT        Treatment:     Therapeutic Exercise for 10 minutes:    HEP instruction:    Supine PPT  Bridges  Bird dog  Paloff press    Next visit:  TrA with biofeedback cuff  Clamshells with band  Lateral walks   Shuttle       Time Entry(in minutes):  PT Evaluation (Low)  Time Entry: 40  Therapeutic Exercise Time Entry: 15    Assessment & Plan   Assessment  Ada presents with a condition of Low complexity.           Functional Limitations: Activity tolerance, Carrying objects, Squatting, Standing tolerance, Pain with ADLs/IADLs  Impairments: Impaired physical strength    Prognosis: Good  Assessment Details: Ada is a 34 y.o. female referred to outpatient Physical Therapy with a medical diagnosis of EDS (Luis-Danlos syndrome), Chronic low back pain without sciatica, unspecified back pain laterality. Upon physical assessment, patient demonstrates bilateral hip weakness, hypermobile hips bilaterally, and decreased core stability. Patient will benefit from skilled outpatient Physical Therapy to address the deficits stated, provide patient education, and to maximize patient's quality of life.      Plan  From a physical therapy perspective, the patient would benefit from: Skilled Rehab Services    Planned therapy interventions include: Therapeutic exercise, Therapeutic activities, Neuromuscular re-education, and Manual therapy.    Planned modalities to include: Biofeedback.        Visit Frequency: 2 times Per Week for 8 Weeks.       This plan was discussed with Patient.          Patient's spiritual, cultural, and educational needs considered and patient agreeable to plan of care and goals.         Goals:   Active       Long Term Goals       Long Term Goals       Start:  03/26/25    Expected End:  05/16/25       1. Patient will demonstrate bilateral hip strength 5/5.   2. Pain Rating at Worst: 2/10 or less to improve overall Quality of Life.   3. Patient will meet predicted functional outcome (FOTO) score: 65% functional ability or greater to increase self-perceived functional ability.  4. Patient will be independent with updated HEP at discharge.               Short Term Goals       Short Term Goals       Start:  03/26/25    Expected End:  04/18/25       1. Patient will be independent  with HEP to supplement therapy in return to maximal function.  2. Pain rating at Worst: 5/10 or less for improved tolerance with lifting.   3. Patient will demonstrate bilateral hip strength at least 4+/5.                Jeannie Angulo, PT

## 2025-03-25 ENCOUNTER — CLINICAL SUPPORT (OUTPATIENT)
Dept: REHABILITATION | Facility: HOSPITAL | Age: 35
End: 2025-03-25
Payer: COMMERCIAL

## 2025-03-25 DIAGNOSIS — Q79.60 EDS (EHLERS-DANLOS SYNDROME): Primary | ICD-10-CM

## 2025-03-25 DIAGNOSIS — R29.898 WEAKNESS OF BOTH HIPS: ICD-10-CM

## 2025-03-25 DIAGNOSIS — M54.50 CHRONIC LOW BACK PAIN WITHOUT SCIATICA, UNSPECIFIED BACK PAIN LATERALITY: ICD-10-CM

## 2025-03-25 DIAGNOSIS — G89.29 CHRONIC LOW BACK PAIN WITHOUT SCIATICA, UNSPECIFIED BACK PAIN LATERALITY: ICD-10-CM

## 2025-03-25 PROCEDURE — 97110 THERAPEUTIC EXERCISES: CPT | Mod: CQ

## 2025-03-25 NOTE — PROGRESS NOTES
Outpatient Rehab    Physical Therapy Visit    Patient Name: Ada Ramirez  MRN: [id number]  YOB: 1990  Encounter Date: 3/25/2025    Therapy Diagnosis:   Encounter Diagnoses   Name Primary?    EDS (Luis-Danlos syndrome) Yes    Chronic low back pain without sciatica, unspecified back pain laterality     Weakness of both hips      Physician: Molly Cunningham MD    Physician Orders: Eval and Treat  Medical Diagnosis: EDS (Luis-Danlos syndrome)  Chronic low back pain without sciatica, unspecified back pain laterality    Visit # / Visits Authorized:  1 / 20  Insurance Authorization Period: 3/19/2025 to 12/31/2025  Date of Evaluation: 3/19/2025  Insurance Authorization Period: 3/12/2025 to 12/31/2025  Plan of Care Certification:  3/19/2025 to 5/16/2025      PT/PTA: PTA   Number of PTA visits since last PT visit:1  Time In: 0900   Time Out: 1000  Total Time: 60   Total Billable Time: 60     FOTO:  Intake Score:  %  Survey Score 1:  %  Survey Score 2:  %         Subjective   Patient said she feels good. Just a little sore from doing household chores during the weekend..  Pain reported as 5/10.      Objective    Objective Measures updated at progress report unless specified.         Treatment:    Therapeutic Exercises:  Shoulder extensions with marches 2x10 green thera band  Palloff press 1 plate 2x10  Bridges 2x10 green thera band  Clamshells 2x10 green thera band  Sidelying hip abduction 2x10   Prone hip extensions 2x10  Sit to stands 2x10 green thera band above knee and 5#  Inner/Outer thigh machine 2x10 both ways 30#   Lateral walks green thera band 3 laps 5yd-10yd wilma       Assessment & Plan   Assessment: Patient was feeling muscle fatigue during clamshells but was able to complete exercise. Patient felt muslce fatigue during sit to stands and she corrected her genu valgus when assuming standing position. Patient did lateral walks with green thera band and felt muscle soreness in the lateral side of  hips and had minial genu valgus but patient corrected the form and was able to complete the exercise.     Patient will continue to benefit from skilled outpatient physical therapy to address the deficits listed in the problem list box on initial evaluation, provide pt/family education and to maximize pt's level of independence in the home and community environment.     Patient's spiritual, cultural, and educational needs considered and patient agreeable to plan of care and goals.           Plan: Continue Physical Therapy Plan of Care     Goals:       Long Term Goals         Start:  03/26/25    Expected End:  05/16/25        1.     Patient will demonstrate bilateral hip strength 5/5.   2.Pain Rating at Worst: 2/10 or less to improve overall Quality of Life.   3.Patient will meet predicted functional outcome (FOTO) score: 65% functional ability or greater to increase self-perceived functional ability.  4.     Patient will be independent with updated HEP at discharge.                  Short Term Goals         Short Term Goals         Start:  03/26/25    Expected End:  04/18/25        1.Patient will be independent with HEP to supplement therapy in return to maximal function.  2.     Pain rating at Worst: 5/10 or less for improved tolerance with lifting.   3.Patient will demonstrate bilateral hip strength at least 4+/5.            [place], PTA

## 2025-03-28 ENCOUNTER — CLINICAL SUPPORT (OUTPATIENT)
Dept: REHABILITATION | Facility: HOSPITAL | Age: 35
End: 2025-03-28
Payer: COMMERCIAL

## 2025-03-28 DIAGNOSIS — R29.898 WEAKNESS OF BOTH HIPS: Primary | ICD-10-CM

## 2025-03-28 PROCEDURE — 97110 THERAPEUTIC EXERCISES: CPT

## 2025-03-28 NOTE — PROGRESS NOTES
Outpatient Rehab    Physical Therapy Visit    Patient Name: Ada Ramirez  MRN: [id number]  YOB: 1990  Encounter Date: 3/28/2025    Therapy Diagnosis:   Encounter Diagnosis   Name Primary?    Weakness of both hips Yes     Physician: Molly Cunningham MD    Physician Orders: Eval and Treat  Medical Diagnosis: EDS (Luis-Danlos syndrome)  Chronic low back pain without sciatica, unspecified back pain laterality    Visit # / Visits Authorized:  3 / 20  Insurance Authorization Period: 3/19/2025 to 12/31/2025  Date of Evaluation: 3/19/2025  Insurance Authorization Period: 3/12/2025 to 12/31/2025  Plan of Care Certification:  3/19/2025 to 5/16/2025      PT/PTA:     Number of PTA visits since last PT visit:   Time In: 1100   Time Out: 1155  Total Time: 55   Total Billable Time:  55 minutes    FOTO:  Intake Score:  %  Survey Score 1:  %  Survey Score 2:  %       Subjective      Patient reports minimal back discomfort at beginning of session.     Pain: 2/10         Objective           Treatment:    Therapeutic Exercises:    Shoulder extensions with marches 2x10 green thera band  Palloff press 10 lbs 2x10 each side   Bridges 3x10 green thera band  Clamshells 3x10 green thera band  Sidelying hip abduction 2x10 bilateral   Prone hip extensions 2x10 bilateral   Sit to stands 3x8 green thera band above knee and holding 8 lb dumbbell  Hip abduction machine 35 lbs 2 x 10  Hip adduction machine 25 lbs 2 x 10  Lateral walks green thera band 3 laps 5 yards       Assessment & Plan   Assessment:       Patient tolerated progressions in therex well without c/o increased back symptoms. She was progressed with increased resistance for sit to stands and hip adduction machine. Will progress treatment per patient's tolerance.     Patient will continue to benefit from skilled outpatient physical therapy to address the deficits listed in the problem list box on initial evaluation, provide pt/family education and to maximize pt's  level of independence in the home and community environment.     Patient's spiritual, cultural, and educational needs considered and patient agreeable to plan of care and goals.         Plan: Continue Physical Therapy Plan of Care     Goals:   Active       Long Term Goals       Long Term Goals       Start:  03/26/25    Expected End:  05/16/25       1. Patient will demonstrate bilateral hip strength 5/5.   2. Pain Rating at Worst: 2/10 or less to improve overall Quality of Life.   3. Patient will meet predicted functional outcome (FOTO) score: 65% functional ability or greater to increase self-perceived functional ability.  4. Patient will be independent with updated HEP at discharge.               Short Term Goals       Short Term Goals       Start:  03/26/25    Expected End:  04/18/25       1. Patient will be independent with HEP to supplement therapy in return to maximal function.  2. Pain rating at Worst: 5/10 or less for improved tolerance with lifting.   3. Patient will demonstrate bilateral hip strength at least 4+/5.                Jeannie Angulo, PT

## 2025-04-01 ENCOUNTER — CLINICAL SUPPORT (OUTPATIENT)
Dept: REHABILITATION | Facility: HOSPITAL | Age: 35
End: 2025-04-01
Payer: COMMERCIAL

## 2025-04-01 DIAGNOSIS — R29.898 WEAKNESS OF BOTH HIPS: Primary | ICD-10-CM

## 2025-04-01 PROCEDURE — 97110 THERAPEUTIC EXERCISES: CPT

## 2025-04-01 NOTE — PROGRESS NOTES
Outpatient Rehab    Physical Therapy Visit    Patient Name: Ada Ramirez  MRN: [id number]  YOB: 1990  Encounter Date: 4/1/2025    Therapy Diagnosis:   Encounter Diagnosis   Name Primary?    Weakness of both hips Yes     Physician: Molly Cunningham MD    Physician Orders: Eval and Treat  Medical Diagnosis: EDS (Luis-Danlos syndrome)  Chronic low back pain without sciatica, unspecified back pain laterality    Visit # / Visits Authorized:  3 / 20  Insurance Authorization Period: 3/19/2025 to 12/31/2025  Date of Evaluation: 3/19/2025  Insurance Authorization Period: 3/12/2025 to 12/31/2025  Plan of Care Certification:  3/19/2025 to 5/16/2025      PT/PTA:     Number of PTA visits since last PT visit:   Time In: 0900   Time Out: 0955  Total Time: 55   Total Billable Time:  55 minutes    FOTO:  Intake Score:  %  Survey Score 1:  %  Survey Score 2:  %       Subjective      Patient reports she was sore after last visit.     Pain: 2/10         Objective           Treatment:    Therapeutic Exercise for 55 minutes:    Shoulder extensions with marches 2x10 green thera band - NP  Palloff press 10 lbs 2x10 each side   Bridges 3x10 green thera band  Clamshells 3x8 green thera band bilateral  Sidelying hip abduction 2x10 bilateral   Prone hip extensions 2x10 bilateral   Sit to stands 3 x 10 green thera band above knee and holding 8 lb dumbbell  Hip abduction machine 35 lbs 2 x 10  Hip adduction machine 30 lbs 2 x 10  Lateral walks green thera band 3 laps 5 yards       Assessment & Plan   Assessment:       Patient tolerated treatment session well and reported feeling better post treatment. She was progressed with increased resistance for sit to stands and hip adduction machine. Will progress treatment per patient's tolerance.     Patient will continue to benefit from skilled outpatient physical therapy to address the deficits listed in the problem list box on initial evaluation, provide pt/family education and to  maximize pt's level of independence in the home and community environment.     Patient's spiritual, cultural, and educational needs considered and patient agreeable to plan of care and goals.         Plan: Continue Physical Therapy Plan of Care     Goals:   Active       Long Term Goals       Long Term Goals       Start:  03/26/25    Expected End:  05/16/25       1. Patient will demonstrate bilateral hip strength 5/5.   2. Pain Rating at Worst: 2/10 or less to improve overall Quality of Life.   3. Patient will meet predicted functional outcome (FOTO) score: 65% functional ability or greater to increase self-perceived functional ability.  4. Patient will be independent with updated HEP at discharge.               Short Term Goals       Short Term Goals       Start:  03/26/25    Expected End:  04/18/25       1. Patient will be independent with HEP to supplement therapy in return to maximal function.  2. Pain rating at Worst: 5/10 or less for improved tolerance with lifting.   3. Patient will demonstrate bilateral hip strength at least 4+/5.                Jeannie Angulo, PT

## 2025-04-11 ENCOUNTER — CLINICAL SUPPORT (OUTPATIENT)
Dept: REHABILITATION | Facility: HOSPITAL | Age: 35
End: 2025-04-11
Payer: COMMERCIAL

## 2025-04-11 DIAGNOSIS — R29.898 WEAKNESS OF BOTH HIPS: Primary | ICD-10-CM

## 2025-04-11 PROCEDURE — 97110 THERAPEUTIC EXERCISES: CPT | Mod: CQ

## 2025-04-11 NOTE — PROGRESS NOTES
Outpatient Rehab    Physical Therapy Visit    Patient Name: Ada Ramirez  MRN: [id number]  YOB: 1990  Encounter Date: 4/11/2025    Therapy Diagnosis:   Encounter Diagnosis   Name Primary?    Weakness of both hips Yes     Physician: Molly Cunningham MD    Physician Orders: Eval and Treat  Medical Diagnosis: EDS (Luis-Danlos syndrome)  Chronic low back pain without sciatica, unspecified back pain laterality    Visit # / Visits Authorized:  4 / 20  Date of Evaluation: 3/19/2025  Insurance Authorization Period: 3/12/2025 to 12/31/2025  Plan of Care Certification:  3/19/2025 to 5/16/2025      PT/PTA: PTA   Number of PTA visits since last PT visit:1    Time In: 1000   Time Out: 1050  Total Time: 50   Total Billable Time:   50    FOTO:  Intake Score:  %  Survey Score 1:  %  Survey Score 2:  %     Subjective   Patient reports being sore due to patient going to [place] and walking around a lot, however, patient denied any increase pain..   Pain reported as 2/10. soreness       Objective    Objective Measures updated at progress report unless specified.       Treatment:     Therapeutic Exercise for 50 minutes:     Shoulder extensions with marches 2x10 green thera band - NP  Palloff press 10 lbs 2x10 each side   Bridges 3x10 green thera band  Clamshells 3x8 green thera band bilateral  Sidelying hip abduction 2x10 bilateral 2 lbs   Prone hip extensions 2x10 bilateral   Sit to stands 3 x 10 green thera band above knee and holding 8 lb dumbbell  Hip abduction machine 35 lbs 2 x 10  Hip adduction machine 30 lbs 2 x 10  Lateral walks green thera band 3 laps 5 yards    Assessment & Plan   Assessment:   Patient able to complete sidelying hip abduction without reports of increased discomfort, however, patient with increased fatigue. Patient required verbal cues with prone hip extension to maintain hip contact with mat throughout exercise. Patient still with some hip fatigue throughout therapeutic exercise, however,  with alternating lower extremities throughout tasks patient able to complete full reps.      Patient will continue to benefit from skilled outpatient physical therapy to address the deficits listed in the problem list box on initial evaluation, provide pt/family education and to maximize pt's level of independence in the home and community environment.     Patient's spiritual, cultural, and educational needs considered and patient agreeable to plan of care and goals.       Plan:   Continue with Physical Therapist Plan of Care.     Goals:   Active       Long Term Goals       Long Term Goals       Start:  03/26/25    Expected End:  05/16/25       1. Patient will demonstrate bilateral hip strength 5/5.   2. Pain Rating at Worst: 2/10 or less to improve overall Quality of Life.   3. Patient will meet predicted functional outcome (FOTO) score: 65% functional ability or greater to increase self-perceived functional ability.  4. Patient will be independent with updated HEP at discharge.               Short Term Goals       Short Term Goals       Start:  03/26/25    Expected End:  04/18/25       1. Patient will be independent with HEP to supplement therapy in return to maximal function.  2. Pain rating at Worst: 5/10 or less for improved tolerance with lifting.   3. Patient will demonstrate bilateral hip strength at least 4+/5.                Lincoln Landers, CARLY

## 2025-04-15 ENCOUNTER — CLINICAL SUPPORT (OUTPATIENT)
Dept: REHABILITATION | Facility: HOSPITAL | Age: 35
End: 2025-04-15
Payer: COMMERCIAL

## 2025-04-15 DIAGNOSIS — R29.898 WEAKNESS OF BOTH HIPS: Primary | ICD-10-CM

## 2025-04-15 PROCEDURE — 97110 THERAPEUTIC EXERCISES: CPT | Mod: CQ

## 2025-04-15 NOTE — PROGRESS NOTES
Outpatient Rehab    Physical Therapy Visit    Patient Name: Ada Ramirez  MRN: [id number]  YOB: 1990  Encounter Date: 4/15/2025    Therapy Diagnosis:   Encounter Diagnosis   Name Primary?    Weakness of both hips Yes     Physician: Molly Cunningham MD    Physician Orders: Eval and Treat  Medical Diagnosis: EDS (Luis-Danlos syndrome)  Chronic low back pain without sciatica, unspecified back pain laterality    Visit # / Visits Authorized:  5 / 20  Date of Evaluation: 3/19/2025  Insurance Authorization Period: 3/12/2025 to 12/31/2025  Plan of Care Certification:  3/19/2025 to 5/16/2025      PT/PTA: PTA   Number of PTA visits since last PT visit:2    Time In: 1100   Time Out: 1153  Total Time: 53   Total Billable Time:   53    FOTO:  Intake Score:  %  Survey Score 1:  %  Survey Score 2:  %     Subjective   Patient reports no new concerns or complaints upon entering Physical Therapy treatment today..   Pain reported as 2/10. soreness     Objective    Objective Measures updated at progress report unless specified.       Treatment:     Therapeutic Exercise for 50 minutes:    suprAmiato bike 6 minutes   Shoulder extensions with marches 2x10 green thera band - NP  Palloff press 10 lbs 2x10 each side   Bridges 3x10 green thera band  Clamshells 3x8 green thera band bilateral  Sidelying hip abduction 2x10 bilateral 2 lbs   Prone hip extensions 2x10 bilateral   Sit to stands 3 x 10 green thera band above knee and holding 8 lb dumbbell  Hip abduction machine 35 lbs 2 x 10  Hip adduction machine 30 lbs 2 x 10  Lateral walks green thera band 3 laps 5 yards    Assessment & Plan   Assessment:   Patient continues to complete therapeutic exercise without reports of increased or reproduced pain throughout or after Physical Therapy treatment. Patient continues to require alternating lower extremities throughout therapeutic exercise due to muscle fatigue. Will attempt to increase resistance throughout therapeutic exercise  upon next treatment session.      Patient will continue to benefit from skilled outpatient physical therapy to address the deficits listed in the problem list box on initial evaluation, provide pt/family education and to maximize pt's level of independence in the home and community environment.     Patient's spiritual, cultural, and educational needs considered and patient agreeable to plan of care and goals.       Plan:   Continue with Physical Therapist Plan of Care.     Goals:   Active       Long Term Goals       Long Term Goals       Start:  03/26/25    Expected End:  05/16/25       1. Patient will demonstrate bilateral hip strength 5/5.   2. Pain Rating at Worst: 2/10 or less to improve overall Quality of Life.   3. Patient will meet predicted functional outcome (FOTO) score: 65% functional ability or greater to increase self-perceived functional ability.  4. Patient will be independent with updated HEP at discharge.               Short Term Goals       Short Term Goals       Start:  03/26/25    Expected End:  04/18/25       1. Patient will be independent with HEP to supplement therapy in return to maximal function.  2. Pain rating at Worst: 5/10 or less for improved tolerance with lifting.   3. Patient will demonstrate bilateral hip strength at least 4+/5.              Lincoln Landers, CARLY

## 2025-04-21 ENCOUNTER — CLINICAL SUPPORT (OUTPATIENT)
Dept: REHABILITATION | Facility: HOSPITAL | Age: 35
End: 2025-04-21
Payer: COMMERCIAL

## 2025-04-21 DIAGNOSIS — R29.898 WEAKNESS OF BOTH HIPS: Primary | ICD-10-CM

## 2025-04-21 PROCEDURE — 97110 THERAPEUTIC EXERCISES: CPT

## 2025-04-21 PROCEDURE — 97112 NEUROMUSCULAR REEDUCATION: CPT

## 2025-04-21 NOTE — PROGRESS NOTES
Outpatient Rehab    Physical Therapy Progress Note    Patient Name: Ada Ramirez  MRN: [id number]  YOB: 1990  Encounter Date: 4/21/2025    Therapy Diagnosis:   Encounter Diagnosis   Name Primary?    Weakness of both hips Yes     Physician: Molly Cunningham MD    Physician Orders: Eval and Treat  Medical Diagnosis: EDS (Luis-Danlos syndrome)  Chronic low back pain without sciatica, unspecified back pain laterality    Visit # / Visits Authorized:  6 / 20  Insurance Authorization Period: 3/19/2025 to 12/31/2025  Date of Evaluation: 3/19/2025  Plan of Care Certification: 3/19/2025 to 5/16/2025     PT/PTA:     Number of PTA visits since last PT visit:   Time In: 0905   Time Out: 0955  Total Time: 50 minutes   Total Billable Time:  50 minutes     FOTO:  Intake Score:  %  Survey Score 1:  %  Survey Score 2:  %       Subjective      Patient reports her back has been feeling better and she is able to tolerate walking longer distances with less limitation. She still has occasional low back pain. She reports no pain currently.     Pain reported as 0/10.      Objective      4/21/2025  Lower Extremity Strength  Right LE   Left LE   Goal   Knee extension: 5/5 Knee extension: 5/5 5/5   Knee flexion: 5/5 Knee flexion: 5/5 5/5   Hip flexion: 5/5 Hip flexion: 5/5 5/5   Hip extension:  4+/5 Hip extension: 4+/5 5/5   Hip abduction: 4+/5 Hip abduction: 4+/5 5/5   Hip IR: 5/5 Hip IR: 5/5 5/5   Hip ER: 4+/5 Hip ER: 4+/5 5/5      Treatment:     Therapeutic Exercise for 40 minutes:    Upright bike 5 minutes   Clamshells 3x10 blue thera band bilateral  Sidelying hip abduction 2x10 bilateral 2 lbs   Prone hip extensions 2x10 bilateral   Sit to stands 3 x 10 holding 10 lb dumbbell  Hip abduction machine 40 lbs 2 x 10  Hip adduction machine 35 lbs 2 x 10  Lateral walks blue thera band 3 laps 5 yards    Neuromuscular Re-education for 10 minutes:    Palloff press + lift 10 lbs 2x10 each side   Bridges 3x10 blue thera  band      Time Entry(in minutes):  Neuromuscular Re-Education Time Entry: 10  Therapeutic Exercise Time Entry: 40    Assessment & Plan   Assessment:    Re-assessment performed today and patient demonstrates improvement in bilateral hip strength with mild deficits remaining. She reports improvement in low back pain and states she is able to walk longer distances with less limitations. She continues to respond well to progressive strengthening with focus on core stability.     Patient will continue to benefit from skilled outpatient physical therapy to address the deficits listed in the problem list box on initial evaluation, provide pt/family education and to maximize pt's level of independence in the home and community environment.     Patient's spiritual, cultural, and educational needs considered and patient agreeable to plan of care and goals.         Plan: Continue per established PT plan of care.     Goals:   Active       Long Term Goals       Long Term Goals       Start:  03/26/25    Expected End:  05/16/25       1. Patient will demonstrate bilateral hip strength 5/5.   2. Pain Rating at Worst: 2/10 or less to improve overall Quality of Life.   3. Patient will meet predicted functional outcome (FOTO) score: 65% functional ability or greater to increase self-perceived functional ability.  4. Patient will be independent with updated HEP at discharge.               Short Term Goals       Short Term Goals       Start:  03/26/25    Expected End:  04/18/25       1. Patient will be independent with HEP to supplement therapy in return to maximal function.  2. Pain rating at Worst: 5/10 or less for improved tolerance with lifting.   3. Patient will demonstrate bilateral hip strength at least 4+/5.              Jeannie Angulo, PT

## 2025-05-01 DIAGNOSIS — F90.0 ADHD (ATTENTION DEFICIT HYPERACTIVITY DISORDER), INATTENTIVE TYPE: ICD-10-CM

## 2025-05-01 RX ORDER — LISDEXAMFETAMINE DIMESYLATE 10 MG/1
10 CAPSULE ORAL DAILY
Qty: 30 CAPSULE | Refills: 0 | Status: SHIPPED | OUTPATIENT
Start: 2025-05-01

## 2025-05-01 NOTE — TELEPHONE ENCOUNTER
No care due was identified.  [facility] Embedded Care Due Messages. Reference number: [id number].   5/01/2025 1:41:07 PM CDT

## 2025-05-01 NOTE — TELEPHONE ENCOUNTER
Last ordered: 03/07/2025    Last office visit: 03/12/2025 annual    Upcoming appointment: 06/12/2025

## 2025-05-05 ENCOUNTER — CLINICAL SUPPORT (OUTPATIENT)
Dept: REHABILITATION | Facility: HOSPITAL | Age: 35
End: 2025-05-05
Payer: COMMERCIAL

## 2025-05-05 DIAGNOSIS — R29.898 WEAKNESS OF BOTH HIPS: Primary | ICD-10-CM

## 2025-05-05 PROCEDURE — 97530 THERAPEUTIC ACTIVITIES: CPT | Mod: CQ

## 2025-05-05 PROCEDURE — 97110 THERAPEUTIC EXERCISES: CPT | Mod: CQ

## 2025-05-05 NOTE — PROGRESS NOTES
Outpatient Rehab    Physical Therapy Visit    Patient Name: Ada Ramirez  MRN: [id number]  YOB: 1990  Encounter Date: 5/5/2025    Therapy Diagnosis:   Encounter Diagnosis   Name Primary?    Weakness of both hips Yes     Physician: Molly Cunningham MD    Physician Orders: Eval and Treat  Medical Diagnosis: EDS (Luis-Danlos syndrome)  Chronic low back pain without sciatica, unspecified back pain laterality    Visit # / Visits Authorized:  7 / 20  Insurance Authorization Period: 3/19/2025 to 12/31/2025  Date of Evaluation: 3/19/2025  Plan of Care Certification: 3/19/2025 to 5/16/2025     PT/PTA: PTA   Number of PTA visits since last PT visit:1    Time In: 0900   Time Out: 0953  Total Time (in minutes): 53   Total Billable Time (in minutes):   53    FOTO:  Intake Score:  %  Survey Score 2:  %  Survey Score 3:  %     Subjective   Patient reports tightness upon entering Physical Therapy treatment today, however, patient denies any increased pain since last treatment session..  Pain reported as 1/10.      Objective    Objective Measures updated at progress report unless specified.         Treatment:   Therapeutic Exercise for 40 minutes:     Upright bike 5 minutes   Clamshells 3x10 blue thera band bilateral  Sidelying hip abduction 2x10 bilateral 2 lbs   Prone hip extensions 2x10 bilateral   Sit to stands 3 x 10 holding 10 lb dumbbell  Hip abduction machine 40 lbs 2 x 10  Hip adduction machine 35 lbs 2 x 10  Lateral walks blue thera band 3 laps 5 yards     Neuromuscular Re-education for 10 minutes:     Palloff press + lift 10 lbs 2x10 each side   Bridges 3x10 blue thera band     Time Entry(in minutes):  Neuromuscular Re-Education Time Entry: 10  Therapeutic Exercise Time Entry: 43    Assessment & Plan   Assessment:   Patient able to resume previous therapeutic exercise without reports of increased or reproduced pain throughout or after treatment. Patient with increased muscle fatigue throughout session  still. Patient able to tolerate slight increased resistance throughout bike warm up today.        Patient will continue to benefit from skilled outpatient physical therapy to address the deficits listed in the problem list box on initial evaluation, provide pt/family education and to maximize pt's level of independence in the home and community environment.     Patient's spiritual, cultural, and educational needs considered and patient agreeable to plan of care and goals.       Plan:   Continue with Physical Therapist Plan of Care.     Goals:   Active       Long Term Goals       Long Term Goals       Start:  03/26/25    Expected End:  05/16/25       1. Patient will demonstrate bilateral hip strength 5/5.   2. Pain Rating at Worst: 2/10 or less to improve overall Quality of Life.   3. Patient will meet predicted functional outcome (FOTO) score: 65% functional ability or greater to increase self-perceived functional ability.  4. Patient will be independent with updated HEP at discharge.               Short Term Goals       Short Term Goals       Start:  03/26/25    Expected End:  04/18/25       1. Patient will be independent with HEP to supplement therapy in return to maximal function.  2. Pain rating at Worst: 5/10 or less for improved tolerance with lifting.   3. Patient will demonstrate bilateral hip strength at least 4+/5.                Lincoln Landers, CARLY

## 2025-05-12 ENCOUNTER — CLINICAL SUPPORT (OUTPATIENT)
Dept: REHABILITATION | Facility: HOSPITAL | Age: 35
End: 2025-05-12
Payer: COMMERCIAL

## 2025-05-12 DIAGNOSIS — R29.898 WEAKNESS OF BOTH HIPS: Primary | ICD-10-CM

## 2025-05-12 PROCEDURE — 97112 NEUROMUSCULAR REEDUCATION: CPT

## 2025-05-12 PROCEDURE — 97110 THERAPEUTIC EXERCISES: CPT

## 2025-05-12 NOTE — PROGRESS NOTES
Outpatient Rehab    Physical Therapy Discharge    Patient Name: Ada Ramirez  MRN: [id number]  YOB: 1990  Encounter Date: 5/12/2025    Therapy Diagnosis:   Encounter Diagnosis   Name Primary?    Weakness of both hips Yes     Physician: Molly Cunningham MD    Physician Orders: Eval and Treat  Medical Diagnosis: EDS (Luis-Danlos syndrome)  Chronic low back pain without sciatica, unspecified back pain laterality    Visit # / Visits Authorized: 8 / 20  Insurance Authorization Period: 3/19/2025 to 12/31/2025  Date of Evaluation: 3/24/2025  Plan of Care Certification: 3/19/2025 to 5/16/2025      PT/PTA:     Number of PTA visits since last PT visit:   Time In: 0905   Time Out: 0945  Total Time (in minutes): 40   Total Billable Time (in minutes):  40 minutes    FOTO:  Intake Score:  %  Survey Score 2:  %  Survey Score 3:  %       Subjective   Patient reports she has been feeling good over the last couple weeks. She feels ready for discharge today..  Pain reported as 0/10.      Objective        Lower Extremity Strength  Right LE   Left LE   Goal   Knee extension: 5/5 Knee extension: 5/5 5/5   Knee flexion: 5/5 Knee flexion: 5/5 5/5   Hip flexion: 5/5 Hip flexion: 5/5 5/5   Hip extension:  5/5 Hip extension: 5/5 5/5   Hip abduction: 5/5 Hip abduction: 5/5 5/5   Hip IR: 5/5 Hip IR: 5/5 5/5   Hip ER: 5/5 Hip ER: 5/5 5/5        Treatment:     Therapeutic Exercise for 30 minutes:     Updated HEP reviewed and handout provided     Upright bike 5 minutes - NP  Clamshells 3x10 blue thera band bilateral  Sidelying hip abduction 2x10 bilateral 2 lbs   Prone hip extensions 2x10 bilateral   Sit to stands 3 x 10 holding 10 lb dumbbell  Hip abduction machine 40 lbs 2 x 10 - NP  Hip adduction machine 35 lbs 2 x 10 - NP  Lateral walks blue thera band 4 laps x 5 yards     Neuromuscular Re-education for 10 minutes:     Palloff press + lift 10 lbs 2x10 each side   Bridges 3x10 blue thera band    Time Entry(in  minutes):  Neuromuscular Re-Education Time Entry: 10  Therapeutic Exercise Time Entry: 30    Assessment & Plan   Assessment:       Patient has responded well to physical therapy treatment with improvement in bilateral hip strength and improved low back symptoms. She reports she is now able to walk long distances and perform daily activities without back pain. She reports she feels ready for discharge today. She is being discharged from PT at this time to continue with home exercise program.     Patient's spiritual, cultural, and educational needs considered and patient agreeable to plan of care and goals.         Plan:  Discharge from physical therapy.     Goals:   Resolved       Long Term Goals       Long Term Goals (Met)       Start:  03/26/25    Expected End:  05/16/25    Resolved:  05/12/25    1. Patient will demonstrate bilateral hip strength 5/5.   2. Pain Rating at Worst: 2/10 or less to improve overall Quality of Life.   3. Patient will meet predicted functional outcome (FOTO) score: 65% functional ability or greater to increase self-perceived functional ability.  4. Patient will be independent with updated HEP at discharge.               Short Term Goals       Short Term Goals (Met)       Start:  03/26/25    Expected End:  04/18/25    Resolved:  05/12/25    1. Patient will be independent with HEP to supplement therapy in return to maximal function.  2. Pain rating at Worst: 5/10 or less for improved tolerance with lifting.   3. Patient will demonstrate bilateral hip strength at least 4+/5.                Jeannie Angulo, PT

## 2025-06-12 ENCOUNTER — OFFICE VISIT (OUTPATIENT)
Dept: INTERNAL MEDICINE | Facility: CLINIC | Age: 35
End: 2025-06-12
Payer: COMMERCIAL

## 2025-06-12 VITALS
BODY MASS INDEX: 22.28 KG/M2 | WEIGHT: 130.5 LBS | TEMPERATURE: 99 F | SYSTOLIC BLOOD PRESSURE: 112 MMHG | DIASTOLIC BLOOD PRESSURE: 76 MMHG | HEIGHT: 64 IN

## 2025-06-12 DIAGNOSIS — F90.0 ADHD (ATTENTION DEFICIT HYPERACTIVITY DISORDER), INATTENTIVE TYPE: Primary | ICD-10-CM

## 2025-06-12 PROCEDURE — 3074F SYST BP LT 130 MM HG: CPT | Mod: CPTII,95,, | Performed by: HOSPITALIST

## 2025-06-12 PROCEDURE — 1159F MED LIST DOCD IN RCRD: CPT | Mod: CPTII,95,, | Performed by: HOSPITALIST

## 2025-06-12 PROCEDURE — 3078F DIAST BP <80 MM HG: CPT | Mod: CPTII,95,, | Performed by: HOSPITALIST

## 2025-06-12 PROCEDURE — 1160F RVW MEDS BY RX/DR IN RCRD: CPT | Mod: CPTII,95,, | Performed by: HOSPITALIST

## 2025-06-12 PROCEDURE — 98004 SYNCH AUDIO-VIDEO EST SF 10: CPT | Mod: 95,,, | Performed by: HOSPITALIST

## 2025-06-12 RX ORDER — LISDEXAMFETAMINE DIMESYLATE 10 MG/1
10 CAPSULE ORAL DAILY
Qty: 30 CAPSULE | Refills: 0 | Status: SHIPPED | OUTPATIENT
Start: 2025-06-12

## 2025-06-12 NOTE — PROGRESS NOTES
The patient location is: [place]  The chief complaint leading to consultation is: med refill     Visit type: audiovisual    Face to Face time with patient: 3 min  13 minutes of total time spent on the encounter, which includes face to face time and non-face to face time preparing to see the patient (eg, review of tests), Obtaining and/or reviewing separately obtained history, Documenting clinical information in the electronic or other health record, Independently interpreting results (not separately reported) and communicating results to the patient/family/caregiver, or Care coordination (not separately reported).         Each patient to whom he or she provides medical services by telemedicine is:  (1) informed of the relationship between the physician and patient and the respective role of any other health care provider with respect to management of the patient; and (2) notified that he or she may decline to receive medical services by telemedicine and may withdraw from such care at any time.    Notes:     Subjective:     @Patient ID: Ada Ramirez is a 34 y.o. female.    Chief Complaint: Medication Refill    HPI    35 yo F with ADHD, RYAN, hypermobility, dysautomnia presents for annual:     1. ADHD: on Vyvanse 10 mg qday.  Was on vyvanse 20 mg but lowered due to decreased appetite and weight loss. Weight has stabilized    Review of Systems   Psychiatric/Behavioral:  Negative for agitation and confusion.      Past medical history, surgical history, and family medical history reviewed and updated as appropriate.    Medications and allergies reviewed.     Objective:     There were no vitals filed for this visit.  There is no height or weight on file to calculate BMI.  Physical Exam  Constitutional:       Appearance: Normal appearance.   HENT:      Head: Normocephalic and atraumatic.   Pulmonary:      Effort: Pulmonary effort is normal.   Neurological:      Mental Status: She is alert and oriented to person, place,  and time.   Psychiatric:         Mood and Affect: Mood normal.         Behavior: Behavior normal.         Lab Results   Component Value Date    WBC 5.28 03/12/2025    HGB 12.9 03/12/2025    HCT 39.3 03/12/2025     03/12/2025    CHOL 165 03/12/2025    TRIG 41 03/12/2025    HDL 68 03/12/2025    ALT 12 03/12/2025    AST 17 03/12/2025     03/12/2025    K 4.1 03/12/2025     03/12/2025    CREATININE 0.7 03/12/2025    BUN 10 03/12/2025    CO2 19 (L) 03/12/2025    TSH 0.709 03/12/2025    HGBA1C 4.7 07/16/2019       Assessment:     1. ADHD (attention deficit hyperactivity disorder), inattentive type      Plan:   Ada was seen today for medication refill.    Diagnoses and all orders for this visit:    ADHD (attention deficit hyperactivity disorder), inattentive type  -     lisdexamfetamine (VYVANSE) 10 mg Cap; Take 1 capsule (10 mg) by mouth once daily.        Molly Cunningham MD  Internal Medicine    6/12/2025

## 2025-07-29 DIAGNOSIS — F90.0 ADHD (ATTENTION DEFICIT HYPERACTIVITY DISORDER), INATTENTIVE TYPE: ICD-10-CM

## 2025-07-29 RX ORDER — LISDEXAMFETAMINE DIMESYLATE 10 MG/1
10 CAPSULE ORAL DAILY
Qty: 30 CAPSULE | Refills: 0 | Status: SHIPPED | OUTPATIENT
Start: 2025-07-29

## 2025-07-29 NOTE — TELEPHONE ENCOUNTER
No care due was identified.  [facility] Embedded Care Due Messages. Reference number: [id number].   7/29/2025 9:29:30 AM CDT

## 2025-08-11 ENCOUNTER — E-VISIT (OUTPATIENT)
Dept: INTERNAL MEDICINE | Facility: CLINIC | Age: 35
End: 2025-08-11
Payer: COMMERCIAL

## 2025-08-11 DIAGNOSIS — F90.0 ADHD (ATTENTION DEFICIT HYPERACTIVITY DISORDER), INATTENTIVE TYPE: Primary | ICD-10-CM

## 2025-08-11 DIAGNOSIS — F41.1 GAD (GENERALIZED ANXIETY DISORDER): ICD-10-CM

## 2025-08-11 RX ORDER — VENLAFAXINE HYDROCHLORIDE 37.5 MG/1
37.5 CAPSULE, EXTENDED RELEASE ORAL DAILY
Qty: 30 CAPSULE | Refills: 11 | Status: SHIPPED | OUTPATIENT
Start: 2025-08-11 | End: 2026-08-11

## 2025-08-11 RX ORDER — ATOMOXETINE 40 MG/1
40 CAPSULE ORAL DAILY
Qty: 30 CAPSULE | Refills: 2 | Status: SHIPPED | OUTPATIENT
Start: 2025-08-11 | End: 2025-09-14

## 2025-08-29 ENCOUNTER — PATIENT MESSAGE (OUTPATIENT)
Dept: INTERNAL MEDICINE | Facility: CLINIC | Age: 35
End: 2025-08-29
Payer: COMMERCIAL

## (undated) DEVICE — DRESSING LEUKOPLAST FLEX 1X3IN